# Patient Record
Sex: FEMALE | Race: WHITE | NOT HISPANIC OR LATINO
[De-identification: names, ages, dates, MRNs, and addresses within clinical notes are randomized per-mention and may not be internally consistent; named-entity substitution may affect disease eponyms.]

---

## 2021-06-21 PROBLEM — Z00.00 ENCOUNTER FOR PREVENTIVE HEALTH EXAMINATION: Status: ACTIVE | Noted: 2021-06-21

## 2021-06-22 ENCOUNTER — APPOINTMENT (OUTPATIENT)
Dept: CV DIAGNOSTICS | Facility: HOSPITAL | Age: 76
End: 2021-06-22

## 2021-06-22 ENCOUNTER — OUTPATIENT (OUTPATIENT)
Dept: OUTPATIENT SERVICES | Facility: HOSPITAL | Age: 76
LOS: 1 days | End: 2021-06-22
Payer: MEDICARE

## 2021-06-22 ENCOUNTER — TRANSCRIPTION ENCOUNTER (OUTPATIENT)
Age: 76
End: 2021-06-22

## 2021-06-22 DIAGNOSIS — I25.10 ATHEROSCLEROTIC HEART DISEASE OF NATIVE CORONARY ARTERY WITHOUT ANGINA PECTORIS: ICD-10-CM

## 2021-06-22 PROCEDURE — 93018 CV STRESS TEST I&R ONLY: CPT

## 2021-06-22 PROCEDURE — 78452 HT MUSCLE IMAGE SPECT MULT: CPT

## 2021-06-22 PROCEDURE — 93016 CV STRESS TEST SUPVJ ONLY: CPT

## 2021-06-22 PROCEDURE — 78452 HT MUSCLE IMAGE SPECT MULT: CPT | Mod: 26,MH

## 2021-06-22 PROCEDURE — A9500: CPT

## 2021-06-22 PROCEDURE — 93017 CV STRESS TEST TRACING ONLY: CPT

## 2021-07-02 ENCOUNTER — NON-APPOINTMENT (OUTPATIENT)
Age: 76
End: 2021-07-02

## 2021-07-08 ENCOUNTER — APPOINTMENT (OUTPATIENT)
Age: 76
End: 2021-07-08
Payer: MEDICARE

## 2021-07-08 VITALS
TEMPERATURE: 97.3 F | WEIGHT: 112 LBS | HEIGHT: 62 IN | DIASTOLIC BLOOD PRESSURE: 54 MMHG | HEART RATE: 87 BPM | RESPIRATION RATE: 16 BRPM | BODY MASS INDEX: 20.61 KG/M2 | SYSTOLIC BLOOD PRESSURE: 96 MMHG | OXYGEN SATURATION: 97 %

## 2021-07-08 DIAGNOSIS — I73.9 PERIPHERAL VASCULAR DISEASE, UNSPECIFIED: ICD-10-CM

## 2021-07-08 DIAGNOSIS — Z72.0 TOBACCO USE: ICD-10-CM

## 2021-07-08 DIAGNOSIS — I25.10 ATHEROSCLEROTIC HEART DISEASE OF NATIVE CORONARY ARTERY W/OUT ANGINA PECTORIS: ICD-10-CM

## 2021-07-08 PROCEDURE — 99204 OFFICE O/P NEW MOD 45 MIN: CPT | Mod: 25

## 2021-07-08 PROCEDURE — 94010 BREATHING CAPACITY TEST: CPT | Mod: PD

## 2021-07-08 PROCEDURE — 94727 GAS DIL/WSHOT DETER LNG VOL: CPT

## 2021-07-08 PROCEDURE — ZZZZZ: CPT | Mod: PD

## 2021-07-08 PROCEDURE — 94729 DIFFUSING CAPACITY: CPT

## 2021-07-08 PROCEDURE — 99406 BEHAV CHNG SMOKING 3-10 MIN: CPT | Mod: 25,PD

## 2021-07-08 NOTE — PHYSICAL EXAM
[No Acute Distress] : no acute distress [Normal Oropharynx] : normal oropharynx [Normal Appearance] : normal appearance [No Neck Mass] : no neck mass [Normal Rate/Rhythm] : normal rate/rhythm [Normal S1, S2] : normal s1, s2 [No Murmurs] : no murmurs [No Resp Distress] : no resp distress [Clear to Auscultation Bilaterally] : clear to auscultation bilaterally [No Abnormalities] : no abnormalities [Benign] : benign [Normal Gait] : normal gait [No Clubbing] : no clubbing [No Cyanosis] : no cyanosis [No Edema] : no edema [FROM] : FROM [Normal Color/ Pigmentation] : normal color/ pigmentation [No Focal Deficits] : no focal deficits [Oriented x3] : oriented x3 [Normal Affect] : normal affect [TextBox_68] : Diminished breath sounds bilaterally

## 2021-07-08 NOTE — PROCEDURE
[FreeTextEntry1] : PFT personally reviewed 7/8/21 mild obstructive ventilatory defect with mild gas exchange abnormality

## 2021-07-08 NOTE — ASSESSMENT
[FreeTextEntry1] : 76 year old female current everyday smoker, COPD emphysema presrnts for evaluation, pre procedure optimization prior to coronary angiography\par \par Continue Trelegy Ellipta 100-25 mcg daily\par Smoking cessation discussed, patient will continue to try to stop, advised patient to stop prior to planned procedures\par Nebulizer machine ordered\par Albuterol/Ipratropium via nebulizer prior to Trelegy and every 6 hours as needed\par LDCT discussed, patient would like to defer until later date after coronary angiography\par 6 minute walk deferred secondary lower extremity PVD\par \par Follow up 4 months  \par \par Patient pulmonary status optimized for planned cardiac procedure

## 2021-07-08 NOTE — REASON FOR VISIT
[Initial] : an initial visit [Cough] : cough [COPD] : COPD [Pre-op Risk Stratification] : pre-op risk stratification [Nicotine Dependence] : nicotine dependence [Family Member] : family member

## 2021-07-08 NOTE — HISTORY OF PRESENT ILLNESS
[Current] : current [>= 30 pack years] : >= 30 pack years [Never] : never [TextBox_4] : Patient is a 76 year old female current everyday smoker Hx COPD, PVD, CAD presents for evaluation.  Patient reports she is having coronary angiogram tomorrow.  She reports Cardiologist asked she be seen by Pulmonary prior to angiogram.  She is currently smoking, although she reports she has cut down.  She smoked 3 PPD in the past.  She reports she is now smoking 1/5 PPD.  patient reports cough which is at times productive.  She is using Trelegy Ellipta daily.  She does not have nebulizer machine.  She reports no increased respiratory symptoms.  She is unable to perform 6 minute walk testing secondary to leg pain.  She has not undergone LDCT screen.  She is here to establish care.\par   [TextBox_11] : 3 [TextBox_13] : 55

## 2021-07-08 NOTE — COUNSELING
[Risk of tobacco use and health benefits of smoking cessation discussed] : Risk of tobacco use and health benefits of smoking cessation discussed [Use of nicotine replacement therapies and other medications discussed] : Use of nicotine replacement therapies and other medications discussed [Willing to Quit Smoking] : Willing to quit smoking [Tobacco Use Cessation Intermediate Greater Than 3 Minutes Up to 10 Minutes] : Tobacco Use Cessation Intermediate Greater Than 3 Minutes Up to 10 Minutes [None] : None [Good understanding] : Patient has a good understanding of lifestyle changes and steps needed to achieve self management goal [FreeTextEntry1] : 8

## 2021-07-08 NOTE — REVIEW OF SYSTEMS
[Cough] : cough [Sputum] : sputum [Wheezing] : wheezing [Chest Discomfort] : chest discomfort [Claudication] : claudication [Negative] : Endocrine

## 2021-07-09 ENCOUNTER — INPATIENT (INPATIENT)
Facility: HOSPITAL | Age: 76
LOS: 12 days | Discharge: HOME HEALTH | DRG: 234 | End: 2021-07-22
Attending: THORACIC SURGERY (CARDIOTHORACIC VASCULAR SURGERY) | Admitting: INTERNAL MEDICINE
Payer: MEDICARE

## 2021-07-09 VITALS
WEIGHT: 113.98 LBS | DIASTOLIC BLOOD PRESSURE: 68 MMHG | OXYGEN SATURATION: 90 % | TEMPERATURE: 98 F | HEIGHT: 62 IN | RESPIRATION RATE: 16 BRPM | SYSTOLIC BLOOD PRESSURE: 119 MMHG | HEART RATE: 88 BPM

## 2021-07-09 DIAGNOSIS — R94.39 ABNORMAL RESULT OF OTHER CARDIOVASCULAR FUNCTION STUDY: ICD-10-CM

## 2021-07-09 LAB
ANION GAP SERPL CALC-SCNC: 14 MMOL/L — SIGNIFICANT CHANGE UP (ref 5–17)
BUN SERPL-MCNC: 17 MG/DL — SIGNIFICANT CHANGE UP (ref 7–23)
CALCIUM SERPL-MCNC: 10.1 MG/DL — SIGNIFICANT CHANGE UP (ref 8.4–10.5)
CHLORIDE SERPL-SCNC: 93 MMOL/L — LOW (ref 96–108)
CO2 SERPL-SCNC: 20 MMOL/L — LOW (ref 22–31)
CREAT SERPL-MCNC: 1.22 MG/DL — SIGNIFICANT CHANGE UP (ref 0.5–1.3)
GLUCOSE SERPL-MCNC: 135 MG/DL — HIGH (ref 70–99)
HCT VFR BLD CALC: 37.4 % — SIGNIFICANT CHANGE UP (ref 34.5–45)
HGB BLD-MCNC: 13 G/DL — SIGNIFICANT CHANGE UP (ref 11.5–15.5)
MCHC RBC-ENTMCNC: 31.3 PG — SIGNIFICANT CHANGE UP (ref 27–34)
MCHC RBC-ENTMCNC: 34.8 GM/DL — SIGNIFICANT CHANGE UP (ref 32–36)
MCV RBC AUTO: 89.9 FL — SIGNIFICANT CHANGE UP (ref 80–100)
NRBC # BLD: 0 /100 WBCS — SIGNIFICANT CHANGE UP (ref 0–0)
PLATELET # BLD AUTO: 227 K/UL — SIGNIFICANT CHANGE UP (ref 150–400)
POTASSIUM SERPL-MCNC: 3.9 MMOL/L — SIGNIFICANT CHANGE UP (ref 3.5–5.3)
POTASSIUM SERPL-SCNC: 3.9 MMOL/L — SIGNIFICANT CHANGE UP (ref 3.5–5.3)
RBC # BLD: 4.16 M/UL — SIGNIFICANT CHANGE UP (ref 3.8–5.2)
RBC # FLD: 13.5 % — SIGNIFICANT CHANGE UP (ref 10.3–14.5)
SODIUM SERPL-SCNC: 127 MMOL/L — LOW (ref 135–145)
WBC # BLD: 7.56 K/UL — SIGNIFICANT CHANGE UP (ref 3.8–10.5)
WBC # FLD AUTO: 7.56 K/UL — SIGNIFICANT CHANGE UP (ref 3.8–10.5)

## 2021-07-09 PROCEDURE — 99221 1ST HOSP IP/OBS SF/LOW 40: CPT

## 2021-07-09 PROCEDURE — 71045 X-RAY EXAM CHEST 1 VIEW: CPT | Mod: 26

## 2021-07-09 PROCEDURE — 93010 ELECTROCARDIOGRAM REPORT: CPT

## 2021-07-09 RX ORDER — FENTANYL CITRATE 50 UG/ML
25 INJECTION INTRAVENOUS ONCE
Refills: 0 | Status: DISCONTINUED | OUTPATIENT
Start: 2021-07-09 | End: 2021-07-09

## 2021-07-09 RX ORDER — GABAPENTIN 400 MG/1
300 CAPSULE ORAL
Refills: 0 | Status: DISCONTINUED | OUTPATIENT
Start: 2021-07-09 | End: 2021-07-15

## 2021-07-09 RX ORDER — BUPROPION HYDROCHLORIDE 150 MG/1
150 TABLET, EXTENDED RELEASE ORAL DAILY
Refills: 0 | Status: COMPLETED | OUTPATIENT
Start: 2021-07-09 | End: 2021-07-11

## 2021-07-09 RX ORDER — ALBUTEROL 90 UG/1
2 AEROSOL, METERED ORAL EVERY 6 HOURS
Refills: 0 | Status: DISCONTINUED | OUTPATIENT
Start: 2021-07-09 | End: 2021-07-15

## 2021-07-09 RX ORDER — TAMSULOSIN HYDROCHLORIDE 0.4 MG/1
0.4 CAPSULE ORAL AT BEDTIME
Refills: 0 | Status: DISCONTINUED | OUTPATIENT
Start: 2021-07-09 | End: 2021-07-10

## 2021-07-09 RX ORDER — DIPHENHYDRAMINE HCL 50 MG
50 CAPSULE ORAL ONCE
Refills: 0 | Status: COMPLETED | OUTPATIENT
Start: 2021-07-09 | End: 2021-07-09

## 2021-07-09 RX ORDER — HYDROCHLOROTHIAZIDE 25 MG
12.5 TABLET ORAL DAILY
Refills: 0 | Status: DISCONTINUED | OUTPATIENT
Start: 2021-07-09 | End: 2021-07-10

## 2021-07-09 RX ORDER — BUPROPION HYDROCHLORIDE 150 MG/1
150 TABLET, EXTENDED RELEASE ORAL
Refills: 0 | Status: DISCONTINUED | OUTPATIENT
Start: 2021-07-12 | End: 2021-07-15

## 2021-07-09 RX ORDER — CEPHALEXIN 500 MG
250 CAPSULE ORAL DAILY
Refills: 0 | Status: DISCONTINUED | OUTPATIENT
Start: 2021-07-09 | End: 2021-07-15

## 2021-07-09 RX ORDER — LANOLIN ALCOHOL/MO/W.PET/CERES
5 CREAM (GRAM) TOPICAL ONCE
Refills: 0 | Status: COMPLETED | OUTPATIENT
Start: 2021-07-09 | End: 2021-07-09

## 2021-07-09 RX ORDER — CILOSTAZOL 100 MG/1
100 TABLET ORAL
Refills: 0 | Status: DISCONTINUED | OUTPATIENT
Start: 2021-07-09 | End: 2021-07-15

## 2021-07-09 RX ORDER — ASPIRIN/CALCIUM CARB/MAGNESIUM 324 MG
81 TABLET ORAL DAILY
Refills: 0 | Status: DISCONTINUED | OUTPATIENT
Start: 2021-07-09 | End: 2021-07-15

## 2021-07-09 RX ADMIN — Medication 12.5 MILLIGRAM(S): at 18:16

## 2021-07-09 RX ADMIN — TAMSULOSIN HYDROCHLORIDE 0.4 MILLIGRAM(S): 0.4 CAPSULE ORAL at 21:09

## 2021-07-09 RX ADMIN — CILOSTAZOL 100 MILLIGRAM(S): 100 TABLET ORAL at 18:16

## 2021-07-09 RX ADMIN — Medication 50 MILLIGRAM(S): at 14:02

## 2021-07-09 RX ADMIN — GABAPENTIN 300 MILLIGRAM(S): 400 CAPSULE ORAL at 18:16

## 2021-07-09 RX ADMIN — Medication 5 MILLIGRAM(S): at 23:43

## 2021-07-09 RX ADMIN — FENTANYL CITRATE 25 MICROGRAM(S): 50 INJECTION INTRAVENOUS at 15:40

## 2021-07-09 RX ADMIN — Medication 0.5 MILLIGRAM(S): at 22:58

## 2021-07-09 RX ADMIN — Medication 81 MILLIGRAM(S): at 18:16

## 2021-07-09 RX ADMIN — BUPROPION HYDROCHLORIDE 150 MILLIGRAM(S): 150 TABLET, EXTENDED RELEASE ORAL at 18:28

## 2021-07-09 RX ADMIN — Medication 250 MILLIGRAM(S): at 18:16

## 2021-07-09 NOTE — CONSULT NOTE ADULT - SUBJECTIVE AND OBJECTIVE BOX
HPI  75y/o  female with no known implantable devices noted COVID 19 negative with Pfizer Vaccine last dose 21 with PMHX of Current tobacco smoker on Nicotine patch , COPD , HTN and PVD. Pt had recent stress test that was abnormal on 21 see below  . Presents today for [facility] with Dr Quinton Whelan. Pt planned for Peripheral bypass surgery . No complaints of CP no sob no palpitations noted.     Dr. Guzman ,[facility] Cardiologist   < from: Nuclear Stress Test-Pharmacologic (21 @ 10:25) >  NUCLEAR FINDINGS:  The left ventricle was small in size.  There are medium-sized, mild defects in the distal  anterior, anteroapical, distal anterolateral, and apical  lateral walls that are reversible suggestive of ischemia.  There are medium-sized, mild to moderate defects in the  anteroseptal andapical septal walls that are mostly fixed  suggestive of infarct with mild crystal-infarct ischemia.  There are medium-sized, mild defects in the inferior and  basal to mid inferoseptal walls that are mostly fixed  suggestive of infarct with minimal crystal-infarct ischemia.  ------------------------------------------------------------------------  GATED ANALYSIS:  Post-stress gated wall motion analysis was performed (LVEF  > 70%;LVEDV = 37 ml.) revealing hypokinesis of the basal  inferior and basal septal walls and reduced systolic  thickening of the mid to distal anteroseptal, mid to  distal inferior, distal anterior, and distal anterolateral  walls.  ------------------------------------------------------------------------  IMPRESSIONS:Abnormal Study  * Chest Pain: No chest pain with administration of  Regadenoson.  * Symptom: Shortness of breath, nausea, cramping.  * HR Response: Appropriate.  * BP Response: Appropriate.  * Heart Rhythm: Sinus Bradycardia - 57 BPM.    < end of copied text >  < from: Nuclear Stress Test-Pharmacologic (21 @ 10:25) >   Baseline ECG: Nonspecific ST-T wave abnormality.  * ECG Changes: No significant ischemic ST segment changes  beyond baseline abnormalities.  * Arrhythmia: Rare VPDs occurred during stress.  Frequent  VPDs occurred during recovery.  * The left ventricle was small in size.  *There are medium-sized, mild defects in the distal  anterior, anteroapical, distal anterolateral, and apical  lateral walls that are reversible suggestive of ischemia.  * There are medium-sized, mild to moderate defects in the  anteroseptal and apical septal walls that are mostly fixed  suggestive of infarct with mild crystal-infarct ischemia.  * There are medium-sized, mild defects in the inferior and  basal to mid inferoseptal walls that are mostly fixed  suggestive of infarct with minimal crystal-infarct ischemia.  * Post-stress gated wall motion analysis was performed  (LVEF > 70%;LVEDV = 37 ml.) revealing hypokinesis of the  basal inferior and basal septal walls and reduced systolic  thickening of the mid to distal anteroseptal, mid to  distalinferior, distal anterior, and distal anterolateral  walls.  *** No previous Nuclear/Stress exam.  ------------------------------------------------------------------------  Confirmed on  2021 - 14:16:08 by Bridger Garcia M.D.  ------------------------------------------------------------------------    < end of copied text >   (2021 12:18)    CT Surgery consulted for    Past Medical History  HTN (hypertension)    Tobacco use    COPD (chronic obstructive pulmonary disease)    PVD (peripheral vascular disease)        Past Surgical History  No significant past surgical history        MEDICATIONS  (STANDING):  aspirin enteric coated 81 milliGRAM(s) Oral daily  buPROPion SR (12-Hour) 150 milliGRAM(s) Oral daily  cephalexin 250 milliGRAM(s) Oral daily  cilostazol 100 milliGRAM(s) Oral two times a day  gabapentin 300 milliGRAM(s) Oral two times a day  hydrochlorothiazide 12.5 milliGRAM(s) Oral daily  tamsulosin 0.4 milliGRAM(s) Oral at bedtime    MEDICATIONS  (PRN):  ALBUTerol    90 MICROgram(s) HFA Inhaler 2 Puff(s) Inhalation every 6 hours PRN Shortness of Breath      Vital Signs Last 24 Hrs  T(C): 36.4 (21 @ 17:52), Max: 36.6 (21 @ 12:18)  T(F): 97.5 (21 @ 17:52), Max: 97.8 (21 @ 12:18)  HR: 63 (21 @ 17:52) (63 - 88)  BP: 144/72 (21 @ 17:52) (92/54 - 144/72)  RR: 16 (21 @ 17:52) (16 - 18)  SpO2: 95% (21 @ 17:52) (90% - 98%)           Daily Height in cm: 157.48 (2021 17:52)    Daily Weight in k.7 (2021 12:18)  Admit Wt: Drug Dosing Weight  Height (cm): 157.5 (2021 17:52)  Weight (kg): 51.71 (2021 12:46)  BMI (kg/m2): 20.8 (2021 17:52)  BSA (m2): 1.51 (2021 17:52)    Allergies: contrast media (gadolinium-based) (Rash; Short breath; Hives)  contrast media (iodine-based) (Rash; Short breath; Hives)      SOCIAL HISTORY:  Smoker: [ ] Yes  [ ] No        PACK YEARS:                         WHEN QUIT?  ETOH use: [ ] Yes  [ ] No              FREQUENCY / QUANTITY:  Ilicit Drug use:  [ ] Yes  [ ] No      Relevant Family History  FAMILY HISTORY:  No pertinent family history in first degree relatives        Review of Systems  GENERAL:  no weakness, fatigue, fevers or chills  NEURO: no dizziness, numbness, tingling or weakness  SKIN: no itching, burning, rashes, or lesions   HEENT: no visual changes;  no headache, no vertigo, no recent colds  RESPIRATORY: no shortness of breath, no cough, sputum, wheezing, hemoptysis;   CARDIOVASCULAR:  no chest pain,  or palpitations  GI: no abd pain. no N/V/D.  PERIPHERAL VASCULAR: no swelling, no tenderness, no erythema, no varicose veins.     PHYSICAL EXAM  General: Well nourished, well developed, NAD.                                              Neuro: Normal exam oriented to person/place & time with no focal motor or sensory  deficits.                    Eyes: Normal exam of conjunctiva & lids, pupils equally reactive.   ENT: Normal exam of nasal/oral mucosa with absence of cyanosis.   Neck: Normal exam of jugular veins, trachea & thyroid.   Chest: Normal lung exam with good air movement absence of wheezes, rales, or rhonchi:                                                                          CV:  Auscultation: normal S1S2, Irregular   Murmurs   Carotids: No Bruits[ ]  Abdominal Aorta: normal [ ] nonpalpable[ ]                                                                         GI: Normal exam of abdomen with no noted masses or tenderness. +BSx4Q                                                                                            Extremities: Normal no evidence of cyanosis or deformity, Edema:   Lower Extremity Pulses: Right[ ] Left[ ]Varicosities[ ]  SKIN : Normal exam to inspection & palpation.                                                           LABS:                        13.0   7.56  )-----------( 227      ( 2021 13:27 )             37.4     07-09    127<L>  |  93<L>  |  17  ----------------------------<  135<H>  3.9   |  20<L>  |  1.22    Ca    10.1      2021 13:27            Cardiac Cath:    TTE / EMILY:    Assessment:  76y Female presents with     Plan:             HPI  76 year old female current everyday smoker on nicotine patch Hx COPD, PVD, CAD presents to [facility] for LHC, as preop testing prior to PAD procedure. S/p LHC revealing TVD, CT surgery consulted for CABG eval.       CT Surgery consulted for CABG eval     Past Medical History  HTN (hypertension)    Tobacco use    COPD (chronic obstructive pulmonary disease)    PVD (peripheral vascular disease)        Past Surgical History  No significant past surgical history        MEDICATIONS  (STANDING):  aspirin enteric coated 81 milliGRAM(s) Oral daily  buPROPion SR (12-Hour) 150 milliGRAM(s) Oral daily  cephalexin 250 milliGRAM(s) Oral daily  cilostazol 100 milliGRAM(s) Oral two times a day  gabapentin 300 milliGRAM(s) Oral two times a day  hydrochlorothiazide 12.5 milliGRAM(s) Oral daily  tamsulosin 0.4 milliGRAM(s) Oral at bedtime    MEDICATIONS  (PRN):  ALBUTerol    90 MICROgram(s) HFA Inhaler 2 Puff(s) Inhalation every 6 hours PRN Shortness of Breath      Vital Signs Last 24 Hrs  T(C): 36.4 (21 @ 17:52), Max: 36.6 (21 @ 12:18)  T(F): 97.5 (21 @ 17:52), Max: 97.8 (21 @ 12:18)  HR: 63 (21 @ 17:52) (63 - 88)  BP: 144/72 (21 @ 17:52) (92/54 - 144/72)  RR: 16 (21 @ 17:52) (16 - 18)  SpO2: 95% (21 @ 17:52) (90% - 98%)           Daily Height in cm: 157.48 (2021 17:52)    Daily Weight in k.7 (2021 12:18)  Admit Wt: Drug Dosing Weight  Height (cm): 157.5 (2021 17:52)  Weight (kg): 51.71 (2021 12:46)  BMI (kg/m2): 20.8 (2021 17:52)  BSA (m2): 1.51 (2021 17:52)    Allergies: contrast media (gadolinium-based) (Rash; Short breath; Hives)  contrast media (iodine-based) (Rash; Short breath; Hives)      SOCIAL HISTORY:  Smoker: [x ] Yes  [ ] No        PACK YEARS:      33 years                   WHEN QUIT?  ETOH use: [ ] Yes  [x ] No              FREQUENCY / QUANTITY:  Ilicit Drug use:  [ ] Yes  [ x] No      Relevant Family History  FAMILY HISTORY:  No pertinent family history in first degree relatives        Review of Systems  GENERAL:  no weakness, fatigue, fevers or chills  NEURO: no dizziness, numbness, tingling or weakness  SKIN: no itching, burning, rashes, or lesions   HEENT: no visual changes;  no headache, no vertigo, no recent colds  RESPIRATORY: no shortness of breath, no cough, sputum, wheezing, hemoptysis  CARDIOVASCULAR:  no chest pain,  or palpitations  GI: no abd pain. no N/V/D.  PERIPHERAL VASCULAR: no swelling, no tenderness, no erythema, no varicose veins.     PHYSICAL EXAM  General: Well nourished, well developed, NAD.                                              Neuro: Normal exam oriented to person/place & time with no focal motor or sensory  deficits.                    Chest: Normal lung exam with good air movement absence of wheezes, rales, or rhonchi                                                                     CV:  Auscultation: normal S1S2, regular NO  Murmurs                                                                         GI: Normal exam of abdomen with no noted masses or tenderness. +BSx4Q                                                                                            Extremities: Normal no evidence of cyanosis or deformity, Edema: none  Lower Extremity Pulses: Right[ x] Left[ x]  SKIN : Normal exam to inspection & palpation.                                                           LABS:                        13.0   7.56  )-----------( 227      ( 2021 13:27 )             37.4     07-    127<L>  |  93<L>  |  17  ----------------------------<  135<H>  3.9   |  20<L>  |  1.22    Ca    10.1      2021 13:27

## 2021-07-09 NOTE — H&P CARDIOLOGY - HISTORY OF PRESENT ILLNESS
This is a 77y/o  female with no known implantable devices noted COVID 19 negative with Pfizer Vaccine last dose 2/4/21 with PMHX of Current tobacco smoker , COPD , HTN and PVD. Pt had recent stress test that was abnormal on 6/22/21 see below  . Presents today for [facility] with Dr Quinton Whelan. Pt planned for Peripheral bypass surgery . No complaints of CP no sob no palpitations noted.   < from: Nuclear Stress Test-Pharmacologic (06.22.21 @ 10:25) >  NUCLEAR FINDINGS:  The left ventricle was small in size.  There are medium-sized, mild defects in the distal  anterior, anteroapical, distal anterolateral, and apical  lateral walls that are reversible suggestive of ischemia.  There are medium-sized, mild to moderate defects in the  anteroseptal andapical septal walls that are mostly fixed  suggestive of infarct with mild crystal-infarct ischemia.  There are medium-sized, mild defects in the inferior and  basal to mid inferoseptal walls that are mostly fixed  suggestive of infarct with minimal crystal-infarct ischemia.  ------------------------------------------------------------------------  GATED ANALYSIS:  Post-stress gated wall motion analysis was performed (LVEF  > 70%;LVEDV = 37 ml.) revealing hypokinesis of the basal  inferior and basal septal walls and reduced systolic  thickening of the mid to distal anteroseptal, mid to  distal inferior, distal anterior, and distal anterolateral  walls.  ------------------------------------------------------------------------  IMPRESSIONS:Abnormal Study  * Chest Pain: No chest pain with administration of  Regadenoson.  * Symptom: Shortness of breath, nausea, cramping.  * HR Response: Appropriate.  * BP Response: Appropriate.  * Heart Rhythm: Sinus Bradycardia - 57 BPM.    < end of copied text >  < from: Nuclear Stress Test-Pharmacologic (06.22.21 @ 10:25) >   Baseline ECG: Nonspecific ST-T wave abnormality.  * ECG Changes: No significant ischemic ST segment changes  beyond baseline abnormalities.  * Arrhythmia: Rare VPDs occurred during stress.  Frequent  VPDs occurred during recovery.  * The left ventricle was small in size.  *There are medium-sized, mild defects in the distal  anterior, anteroapical, distal anterolateral, and apical  lateral walls that are reversible suggestive of ischemia.  * There are medium-sized, mild to moderate defects in the  anteroseptal and apical septal walls that are mostly fixed  suggestive of infarct with mild crystal-infarct ischemia.  * There are medium-sized, mild defects in the inferior and  basal to mid inferoseptal walls that are mostly fixed  suggestive of infarct with minimal crystal-infarct ischemia.  * Post-stress gated wall motion analysis was performed  (LVEF > 70%;LVEDV = 37 ml.) revealing hypokinesis of the  basal inferior and basal septal walls and reduced systolic  thickening of the mid to distal anteroseptal, mid to  distalinferior, distal anterior, and distal anterolateral  walls.  *** No previous Nuclear/Stress exam.  ------------------------------------------------------------------------  Confirmed on  6/22/2021 - 14:16:08 by Bridger Garcia M.D.  ------------------------------------------------------------------------    < end of copied text >   This is a 77y/o  female with no known implantable devices noted COVID 19 negative with Pfizer Vaccine last dose 2/4/21 with PMHX of Current tobacco smoker on Nicotine patch , COPD , HTN and PVD. Pt had recent stress test that was abnormal on 6/22/21 see below  . Presents today for [facility] with Dr Quinton Whelan. Pt planned for Peripheral bypass surgery . No complaints of CP no sob no palpitations noted.     Dr. Guzman ,[facility] Cardiologist   < from: Nuclear Stress Test-Pharmacologic (06.22.21 @ 10:25) >  NUCLEAR FINDINGS:  The left ventricle was small in size.  There are medium-sized, mild defects in the distal  anterior, anteroapical, distal anterolateral, and apical  lateral walls that are reversible suggestive of ischemia.  There are medium-sized, mild to moderate defects in the  anteroseptal andapical septal walls that are mostly fixed  suggestive of infarct with mild crystal-infarct ischemia.  There are medium-sized, mild defects in the inferior and  basal to mid inferoseptal walls that are mostly fixed  suggestive of infarct with minimal crystal-infarct ischemia.  ------------------------------------------------------------------------  GATED ANALYSIS:  Post-stress gated wall motion analysis was performed (LVEF  > 70%;LVEDV = 37 ml.) revealing hypokinesis of the basal  inferior and basal septal walls and reduced systolic  thickening of the mid to distal anteroseptal, mid to  distal inferior, distal anterior, and distal anterolateral  walls.  ------------------------------------------------------------------------  IMPRESSIONS:Abnormal Study  * Chest Pain: No chest pain with administration of  Regadenoson.  * Symptom: Shortness of breath, nausea, cramping.  * HR Response: Appropriate.  * BP Response: Appropriate.  * Heart Rhythm: Sinus Bradycardia - 57 BPM.    < end of copied text >  < from: Nuclear Stress Test-Pharmacologic (06.22.21 @ 10:25) >   Baseline ECG: Nonspecific ST-T wave abnormality.  * ECG Changes: No significant ischemic ST segment changes  beyond baseline abnormalities.  * Arrhythmia: Rare VPDs occurred during stress.  Frequent  VPDs occurred during recovery.  * The left ventricle was small in size.  *There are medium-sized, mild defects in the distal  anterior, anteroapical, distal anterolateral, and apical  lateral walls that are reversible suggestive of ischemia.  * There are medium-sized, mild to moderate defects in the  anteroseptal and apical septal walls that are mostly fixed  suggestive of infarct with mild crystal-infarct ischemia.  * There are medium-sized, mild defects in the inferior and  basal to mid inferoseptal walls that are mostly fixed  suggestive of infarct with minimal crystal-infarct ischemia.  * Post-stress gated wall motion analysis was performed  (LVEF > 70%;LVEDV = 37 ml.) revealing hypokinesis of the  basal inferior and basal septal walls and reduced systolic  thickening of the mid to distal anteroseptal, mid to  distalinferior, distal anterior, and distal anterolateral  walls.  *** No previous Nuclear/Stress exam.  ------------------------------------------------------------------------  Confirmed on  6/22/2021 - 14:16:08 by Bridger Garcia M.D.  ------------------------------------------------------------------------    < end of copied text >

## 2021-07-09 NOTE — CONSULT NOTE ADULT - ASSESSMENT
76 year old female current everyday smoker Hx COPD, PVD, CAD presents to [facility] for LHC, as preop testing for       76 year old female current everyday smoker on nicotine patch Hx COPD, PVD, CAD presents to [facility] for LHC, as preop testing prior to PAD procedure. S/p LHC revealing TVD, CT surgery consulted for CABG eval.     CT Surgery evaluation in progress  Preoperative work up in progress  Dr Cole to review angiogram findings  TTE to eval cardiac function / r/o valvular disease  asa, statin, start beta-blockers as tolerated      76 year old female current everyday smoker on nicotine patch Hx COPD, PVD, CAD presents to [facility] for LHC, as preop testing prior to PAD procedure. S/p LHC revealing TVD, CT surgery consulted for CABG eval.     CT Surgery evaluation in progress  Preoperative work up in progress  Dr Cole to review angiogram findings  TTE to eval cardiac function / r/o valvular disease  asa, statin, start beta-blockers as tolerated  arterial doppler studies to evaluate radial arteries for bypass graft conduit

## 2021-07-09 NOTE — H&P CARDIOLOGY - PMH
COPD (chronic obstructive pulmonary disease)    HTN (hypertension)    PVD (peripheral vascular disease)    Tobacco use

## 2021-07-09 NOTE — PROVIDER CONTACT NOTE (OTHER) - ASSESSMENT
Patient agitated. Patient is current smoker, feeling anxious and agitated. Family member would like a medication to help with anxiety.

## 2021-07-09 NOTE — CHART NOTE - NSCHARTNOTEFT_GEN_A_CORE
Notified by RN that patient is anxious at bedside because she cannot smoke. Pt is evaluated at bedside, no acute distress is noted. Pt states that she feels anxious being in the hospital, her condition and expresses a desire to smoke to quell her anxiety. Pt denies chest pain, palpitations, lightheadedness, dizziness, headaches, vision changes, nausea/vomiting and diaphoresis.     ICU Vital Signs Last 24 Hrs  T(C): 36.6 (09 Jul 2021 20:23), Max: 36.6 (09 Jul 2021 12:18)  T(F): 97.8 (09 Jul 2021 20:23), Max: 97.8 (09 Jul 2021 12:18)  HR: 77 (09 Jul 2021 20:23) (63 - 88)  BP: 115/65 (09 Jul 2021 20:23) (92/54 - 144/72)  BP(mean): 85 (09 Jul 2021 12:18) (85 - 85)  ABP: --  ABP(mean): --  RR: 18 (09 Jul 2021 20:23) (16 - 18)  SpO2: 92% (09 Jul 2021 21:13) (89% - 98%)    07-09    127<L>  |  93<L>  |  17  ----------------------------<  135<H>  3.9   |  20<L>  |  1.22    Ca    10.1      09 Jul 2021 13:27                            13.0   7.56  )-----------( 227      ( 09 Jul 2021 13:27 )             37.4       IMPRESSION: Anxiety   - Pt is mentating well at bedside with stable vitals  - Ativan 0.5mg x1   - Discussed with Dr. Strickland on overnight event, aware and in agreement   - Continue to monitor vitals closely overnight   - Endorse/sign out to day team on overnight events   - RN aware of management       Jocy Lopez PA-C   Dept of Medicine   [id number]

## 2021-07-10 LAB
ANION GAP SERPL CALC-SCNC: 13 MMOL/L — SIGNIFICANT CHANGE UP (ref 5–17)
APPEARANCE UR: CLEAR — SIGNIFICANT CHANGE UP
APTT BLD: 27.2 SEC — LOW (ref 27.5–35.5)
BILIRUB UR-MCNC: NEGATIVE — SIGNIFICANT CHANGE UP
BLD GP AB SCN SERPL QL: NEGATIVE — SIGNIFICANT CHANGE UP
BUN SERPL-MCNC: 24 MG/DL — HIGH (ref 7–23)
CALCIUM SERPL-MCNC: 9.6 MG/DL — SIGNIFICANT CHANGE UP (ref 8.4–10.5)
CHLORIDE SERPL-SCNC: 95 MMOL/L — LOW (ref 96–108)
CHLORIDE UR-SCNC: <35 MMOL/L — SIGNIFICANT CHANGE UP
CHOLEST SERPL-MCNC: 164 MG/DL — SIGNIFICANT CHANGE UP
CO2 SERPL-SCNC: 19 MMOL/L — LOW (ref 22–31)
COLOR SPEC: SIGNIFICANT CHANGE UP
COVID-19 SPIKE DOMAIN AB INTERP: POSITIVE
COVID-19 SPIKE DOMAIN ANTIBODY RESULT: >250 U/ML — HIGH
CREAT SERPL-MCNC: 1.22 MG/DL — SIGNIFICANT CHANGE UP (ref 0.5–1.3)
DIFF PNL FLD: ABNORMAL
GLUCOSE SERPL-MCNC: 135 MG/DL — HIGH (ref 70–99)
GLUCOSE UR QL: NEGATIVE — SIGNIFICANT CHANGE UP
HCT VFR BLD CALC: 31.8 % — LOW (ref 34.5–45)
HDLC SERPL-MCNC: 77 MG/DL — SIGNIFICANT CHANGE UP
HGB BLD-MCNC: 10.9 G/DL — LOW (ref 11.5–15.5)
INR BLD: 0.92 RATIO — SIGNIFICANT CHANGE UP (ref 0.88–1.16)
KETONES UR-MCNC: NEGATIVE — SIGNIFICANT CHANGE UP
LEUKOCYTE ESTERASE UR-ACNC: NEGATIVE — SIGNIFICANT CHANGE UP
LIPID PNL WITH DIRECT LDL SERPL: 79 MG/DL — SIGNIFICANT CHANGE UP
MCHC RBC-ENTMCNC: 31.1 PG — SIGNIFICANT CHANGE UP (ref 27–34)
MCHC RBC-ENTMCNC: 34.3 GM/DL — SIGNIFICANT CHANGE UP (ref 32–36)
MCV RBC AUTO: 90.6 FL — SIGNIFICANT CHANGE UP (ref 80–100)
MRSA PCR RESULT.: SIGNIFICANT CHANGE UP
NITRITE UR-MCNC: NEGATIVE — SIGNIFICANT CHANGE UP
NON HDL CHOLESTEROL: 86 MG/DL — SIGNIFICANT CHANGE UP
NRBC # BLD: 0 /100 WBCS — SIGNIFICANT CHANGE UP (ref 0–0)
NT-PROBNP SERPL-SCNC: 376 PG/ML — HIGH (ref 0–300)
OSMOLALITY UR: 227 MOS/KG — LOW (ref 300–900)
PA ADP PRP-ACNC: 263 PRU — SIGNIFICANT CHANGE UP (ref 194–417)
PH UR: 6 — SIGNIFICANT CHANGE UP (ref 5–8)
PLATELET # BLD AUTO: 203 K/UL — SIGNIFICANT CHANGE UP (ref 150–400)
POTASSIUM SERPL-MCNC: 3.6 MMOL/L — SIGNIFICANT CHANGE UP (ref 3.5–5.3)
POTASSIUM SERPL-SCNC: 3.6 MMOL/L — SIGNIFICANT CHANGE UP (ref 3.5–5.3)
PREALB SERPL-MCNC: 17 MG/DL — LOW (ref 20–40)
PROT UR-MCNC: NEGATIVE — SIGNIFICANT CHANGE UP
PROTHROM AB SERPL-ACNC: 11.1 SEC — SIGNIFICANT CHANGE UP (ref 10.6–13.6)
RBC # BLD: 3.51 M/UL — LOW (ref 3.8–5.2)
RBC # FLD: 13.4 % — SIGNIFICANT CHANGE UP (ref 10.3–14.5)
RH IG SCN BLD-IMP: POSITIVE — SIGNIFICANT CHANGE UP
S AUREUS DNA NOSE QL NAA+PROBE: SIGNIFICANT CHANGE UP
SARS-COV-2 IGG+IGM SERPL QL IA: >250 U/ML — HIGH
SARS-COV-2 IGG+IGM SERPL QL IA: POSITIVE
SODIUM SERPL-SCNC: 127 MMOL/L — LOW (ref 135–145)
SODIUM UR-SCNC: 36 MMOL/L — SIGNIFICANT CHANGE UP
SP GR SPEC: 1.02 — SIGNIFICANT CHANGE UP (ref 1.01–1.02)
TRIGL SERPL-MCNC: 36 MG/DL — SIGNIFICANT CHANGE UP
UROBILINOGEN FLD QL: NEGATIVE — SIGNIFICANT CHANGE UP
WBC # BLD: 13.44 K/UL — HIGH (ref 3.8–10.5)
WBC # FLD AUTO: 13.44 K/UL — HIGH (ref 3.8–10.5)

## 2021-07-10 PROCEDURE — 93010 ELECTROCARDIOGRAM REPORT: CPT

## 2021-07-10 RX ORDER — LANOLIN ALCOHOL/MO/W.PET/CERES
5 CREAM (GRAM) TOPICAL ONCE
Refills: 0 | Status: COMPLETED | OUTPATIENT
Start: 2021-07-10 | End: 2021-07-10

## 2021-07-10 RX ORDER — SODIUM CHLORIDE 9 MG/ML
1 INJECTION INTRAMUSCULAR; INTRAVENOUS; SUBCUTANEOUS
Refills: 0 | Status: DISCONTINUED | OUTPATIENT
Start: 2021-07-10 | End: 2021-07-15

## 2021-07-10 RX ORDER — SODIUM CHLORIDE 9 MG/ML
500 INJECTION INTRAMUSCULAR; INTRAVENOUS; SUBCUTANEOUS ONCE
Refills: 0 | Status: COMPLETED | OUTPATIENT
Start: 2021-07-10 | End: 2021-07-10

## 2021-07-10 RX ORDER — HEPARIN SODIUM 5000 [USP'U]/ML
5000 INJECTION INTRAVENOUS; SUBCUTANEOUS EVERY 12 HOURS
Refills: 0 | Status: DISCONTINUED | OUTPATIENT
Start: 2021-07-10 | End: 2021-07-15

## 2021-07-10 RX ORDER — NICOTINE POLACRILEX 2 MG
1 GUM BUCCAL DAILY
Refills: 0 | Status: DISCONTINUED | OUTPATIENT
Start: 2021-07-10 | End: 2021-07-15

## 2021-07-10 RX ORDER — METOPROLOL TARTRATE 50 MG
12.5 TABLET ORAL
Refills: 0 | Status: DISCONTINUED | OUTPATIENT
Start: 2021-07-10 | End: 2021-07-10

## 2021-07-10 RX ORDER — PANTOPRAZOLE SODIUM 20 MG/1
40 TABLET, DELAYED RELEASE ORAL
Refills: 0 | Status: DISCONTINUED | OUTPATIENT
Start: 2021-07-10 | End: 2021-07-15

## 2021-07-10 RX ADMIN — HEPARIN SODIUM 5000 UNIT(S): 5000 INJECTION INTRAVENOUS; SUBCUTANEOUS at 17:43

## 2021-07-10 RX ADMIN — SODIUM CHLORIDE 1000 MILLILITER(S): 9 INJECTION INTRAMUSCULAR; INTRAVENOUS; SUBCUTANEOUS at 22:39

## 2021-07-10 RX ADMIN — PANTOPRAZOLE SODIUM 40 MILLIGRAM(S): 20 TABLET, DELAYED RELEASE ORAL at 17:47

## 2021-07-10 RX ADMIN — SODIUM CHLORIDE 1 GRAM(S): 9 INJECTION INTRAMUSCULAR; INTRAVENOUS; SUBCUTANEOUS at 17:48

## 2021-07-10 RX ADMIN — SODIUM CHLORIDE 1000 MILLILITER(S): 9 INJECTION INTRAMUSCULAR; INTRAVENOUS; SUBCUTANEOUS at 17:42

## 2021-07-10 RX ADMIN — Medication 250 MILLIGRAM(S): at 11:34

## 2021-07-10 RX ADMIN — GABAPENTIN 300 MILLIGRAM(S): 400 CAPSULE ORAL at 05:38

## 2021-07-10 RX ADMIN — CILOSTAZOL 100 MILLIGRAM(S): 100 TABLET ORAL at 17:43

## 2021-07-10 RX ADMIN — GABAPENTIN 300 MILLIGRAM(S): 400 CAPSULE ORAL at 17:48

## 2021-07-10 RX ADMIN — BUPROPION HYDROCHLORIDE 150 MILLIGRAM(S): 150 TABLET, EXTENDED RELEASE ORAL at 11:34

## 2021-07-10 RX ADMIN — Medication 12.5 MILLIGRAM(S): at 05:38

## 2021-07-10 RX ADMIN — Medication 5 MILLIGRAM(S): at 22:41

## 2021-07-10 RX ADMIN — Medication 1 PATCH: at 11:40

## 2021-07-10 RX ADMIN — CILOSTAZOL 100 MILLIGRAM(S): 100 TABLET ORAL at 05:38

## 2021-07-10 RX ADMIN — Medication 81 MILLIGRAM(S): at 11:35

## 2021-07-10 RX ADMIN — Medication 1 PATCH: at 17:49

## 2021-07-10 NOTE — CHART NOTE - NSCHARTNOTEFT_GEN_A_CORE
Notified by RN of pt's BP 72/48. Pt asymptomatic, denies any dizziness and lightheadedness. Spoke with Dr. Em deleon to dcd Lopressor po 12.5mg BID and order IVF 500cc over 60mins.     [place]  [id number]

## 2021-07-10 NOTE — PROGRESS NOTE ADULT - SUBJECTIVE AND OBJECTIVE BOX
DATE OF SERVICE: 07-10-21 @ 11:39  CHIEF COMPLAINT:Patient is a 76y old  Female who presents with a chief complaint of cabg eval (09 Jul 2021 17:56)    	        PAST MEDICAL & SURGICAL HISTORY:  HTN (hypertension)    Tobacco use    COPD (chronic obstructive pulmonary disease)    PVD (peripheral vascular disease)    No significant past surgical history            REVIEW OF SYSTEMS:    NECK: No pain or stiffness  RESPIRATORY: occ cough / chronic  / crocker  CARDIOVASCULAR: No chest pain, palpitations, passing out, dizziness,   GASTROINTESTINAL: No abdominal or epigastric pain. No nausea, vomiting, or hematemesis; No diarrhea or constipation. No melena or hematochezia.  GENITOURINARY: No dysuria, frequency, hematuria, or incontinence  NEUROLOGICAL: No headaches,     MUSCULOSKELETAL: No joint pain or swelling; No muscle, back, or extremity pain    Medications:  MEDICATIONS  (STANDING):  aspirin enteric coated 81 milliGRAM(s) Oral daily  buPROPion SR (12-Hour) 150 milliGRAM(s) Oral daily  cephalexin 250 milliGRAM(s) Oral daily  cilostazol 100 milliGRAM(s) Oral two times a day  gabapentin 300 milliGRAM(s) Oral two times a day  hydrochlorothiazide 12.5 milliGRAM(s) Oral daily  nicotine - 21 mG/24Hr(s) Patch 1 patch Transdermal daily  tamsulosin 0.4 milliGRAM(s) Oral at bedtime    MEDICATIONS  (PRN):  ALBUTerol    90 MICROgram(s) HFA Inhaler 2 Puff(s) Inhalation every 6 hours PRN Shortness of Breath    	    PHYSICAL EXAM:  T(C): 36.7 (07-10-21 @ 04:10), Max: 36.7 (07-10-21 @ 04:10)  HR: 82 (07-10-21 @ 04:10) (63 - 88)  BP: 98/59 (07-10-21 @ 04:10) (92/54 - 144/72)  RR: 16 (07-10-21 @ 04:10) (16 - 18)  SpO2: 97% (07-10-21 @ 04:10) (89% - 98%)  Wt(kg): --  I&O's Summary    09 Jul 2021 07:01  -  10 Jul 2021 07:00  --------------------------------------------------------  IN: 240 mL / OUT: 0 mL / NET: 240 mL    10 Jul 2021 07:01  -  10 Jul 2021 11:39  --------------------------------------------------------  IN: 240 mL / OUT: 0 mL / NET: 240 mL        Appearance: Normal	  HEENT:   Normal oral mucosa, PERRL, EOMI	  Lymphatic: No lymphadenopathy  Cardiovascular: Normal S1 S2, No JVD,  Respiratory: dec bs   Gastrointestinal:  Soft, Non-tender, + BS	  	  Neurologic: Non-focal  Extremities: Normal range of motion, No clubbing, cyanosis or edema  Vascular: Peripheral pulses palpable 2+ bilaterally    TELEMETRY: 	    ECG:  	  RADIOLOGY:  OTHER: 	  	  LABS:	 	    CARDIAC MARKERS:                                10.9   13.44 )-----------( 203      ( 10 Jul 2021 06:02 )             31.8     07-10    127<L>  |  95<L>  |  24<H>  ----------------------------<  135<H>  3.6   |  19<L>  |  1.22    Ca    9.6      10 Jul 2021 06:02      proBNP: Serum Pro-Brain Natriuretic Peptide: 376 pg/mL (07-10 @ 06:02)    Lipid Profile:   HgA1c:   TSH:

## 2021-07-10 NOTE — CHART NOTE - NSCHARTNOTEFT_GEN_A_CORE
Notified by RN of patient with BP of 78/42. Patient currently laying in bed, asymptomatic. Denies dizziness, lightheadedness, chest pain, SOB, palpitations. Of note, patient was hypotensive earlier today at 1PM to 72/48; requiring 1L NS bolus. BP recovered at that time to 96/54. Dr. Strickland called and notified of the current change in BP. Will give an additional 500cc NS bolus. Nurse notified to check repeat pressure following fluids. If there is no improvement in BP, will contact MICU for consult. Dr. Strickland agrees with above plan. Will continue to monitor patient overnight.    Melva Helm PA-C  Medicine  [phone] Notified by RN of patient with BP of 78/42. Patient currently laying in bed, asymptomatic. Denies dizziness, lightheadedness, chest pain, SOB, palpitations. Of note, patient was hypotensive earlier today at 1PM to 72/48; requiring 1L NS bolus. BP recovered at that time to 96/54. Dr. Strickland called and notified of the current change in BP. Will give an additional 500cc NS bolus. Nurse notified to check repeat pressure following fluids. If there is no improvement in BP, will contact MICU for consult. Dr. Strickland agrees with above plan. Will continue to monitor patient overnight.    Melva Helm PA-C  Medicine  [id number]    ADDENDUM: Repeat blood pressure 96/72. Remains asymptomatic. Will continue to monitor overnight. Notified by RN of patient with BP of 78/42. Patient currently laying in bed, asymptomatic. Denies dizziness, lightheadedness, chest pain, SOB, palpitations. Of note, patient was hypotensive earlier today at 1PM to 72/48; requiring 1L NS bolus. BP recovered at that time to 96/54. Dr. Strickland called and notified of the current change in BP. Will give an additional 500cc NS bolus. Nurse notified to check repeat pressure following fluids. If there is no improvement in BP, will contact MICU for consult. Dr. Strickland agrees with above plan. Will continue to monitor patient overnight.    Melva Helm PA-C  Medicine  [phone]    ADDENDUM: Repeat blood pressure 98/76. Remains asymptomatic. Will continue to monitor overnight.

## 2021-07-10 NOTE — CONSULT NOTE ADULT - SUBJECTIVE AND OBJECTIVE BOX
CHIEF COMPLAINT:Patient is a 76y old  Female who presents with a chief complaint of cabg eval (09 Jul 2021 17:56)      HISTORY OF PRESENT ILLNESS:    76 female with tobacco use , PVD, HTN planned for lower ext bypass surgery seen by me for pre op sent for cath showing now triple vessel cad   no cp   has SHEIKH     PAST MEDICAL & SURGICAL HISTORY:  HTN (hypertension)    Tobacco use    COPD (chronic obstructive pulmonary disease)    PVD (peripheral vascular disease)    No significant past surgical history            MEDICATIONS:  aspirin enteric coated 81 milliGRAM(s) Oral daily  cilostazol 100 milliGRAM(s) Oral two times a day  heparin   Injectable 5000 Unit(s) SubCutaneous every 12 hours  metoprolol tartrate 12.5 milliGRAM(s) Oral two times a day  tamsulosin 0.4 milliGRAM(s) Oral at bedtime    cephalexin 250 milliGRAM(s) Oral daily    ALBUTerol    90 MICROgram(s) HFA Inhaler 2 Puff(s) Inhalation every 6 hours PRN    buPROPion SR (12-Hour) 150 milliGRAM(s) Oral daily  gabapentin 300 milliGRAM(s) Oral two times a day    pantoprazole    Tablet 40 milliGRAM(s) Oral before breakfast          FAMILY HISTORY:  No pertinent family history in first degree relatives        Non-contributory    SOCIAL HISTORY:    pos tobacco     Allergies    contrast media (gadolinium-based) (Rash; Short breath; Hives)  contrast media (iodine-based) (Rash; Short breath; Hives)    Intolerances    	    REVIEW OF SYSTEMS:  as above  The rest of the 14 points ROS reviewed and except above they are unremarkable.        PHYSICAL EXAM:  T(C): 36.7 (07-10-21 @ 04:10), Max: 36.7 (07-10-21 @ 04:10)  HR: 82 (07-10-21 @ 04:10) (63 - 88)  BP: 98/59 (07-10-21 @ 04:10) (92/54 - 144/72)  RR: 16 (07-10-21 @ 04:10) (16 - 18)  SpO2: 97% (07-10-21 @ 04:10) (89% - 98%)  Wt(kg): --  I&O's Summary    09 Jul 2021 07:01  -  10 Jul 2021 07:00  --------------------------------------------------------  IN: 240 mL / OUT: 0 mL / NET: 240 mL    10 Jul 2021 07:01  -  10 Jul 2021 12:19  --------------------------------------------------------  IN: 240 mL / OUT: 0 mL / NET: 240 mL        Appearance: Normal	  HEENT:   no gross abnormality   Cardiovascular: Normal S1 S2,    Murmur:   Neck: JVP normal  Respiratory: Lungs clear   Gastrointestinal:  Soft, Non-tender  Skin: normal   Neuro: No gross deficits.   Psychiatry:  Mood & affect flat  Ext: No edema    LABS/DATA:    TELEMETRY: 	    ECG:  	   	  CARDIAC MARKERS:                                      10.9   13.44 )-----------( 203      ( 10 Jul 2021 06:02 )             31.8     07-10    127<L>  |  95<L>  |  24<H>  ----------------------------<  135<H>  3.6   |  19<L>  |  1.22    Ca    9.6      10 Jul 2021 06:02      proBNP: Serum Pro-Brain Natriuretic Peptide: 376 pg/mL (07-10 @ 06:02)    Lipid Profile:   HgA1c:   TSH:            Trazodone 50mg qhs

## 2021-07-10 NOTE — CONSULT NOTE ADULT - SUBJECTIVE AND OBJECTIVE BOX
Patient is a 76y old  Female who presents with a chief complaint of cabg eval (2021 17:56)      HPI:  This is a 77y/o  female with no known implantable devices noted COVID 19 negative with Pfizer Vaccine last dose 21 with PMHX of Current tobacco smoker on Nicotine patch , COPD , HTN and PVD. Pt had recent stress test that was abnormal on 21 and presented  for cardiac work up/LHC/CABG eval as well as  peripheral bypass surgery . No complaints of CP no sob no palpitations noted. We are consulted for hYponatremia which is secondary to telmisartan/HCTZ use ( now discontinued). Reports No NSAID use. Patient reports no SOB, swelling of legs. She reports some dizziness and getting  cc bolus         PAST MEDICAL & SURGICAL HISTORY:  HTN (hypertension)    Tobacco use    COPD (chronic obstructive pulmonary disease)    PVD (peripheral vascular disease)    No significant past surgical history        MEDICATIONS  (STANDING):  aspirin enteric coated 81 milliGRAM(s) Oral daily  buPROPion SR (12-Hour) 150 milliGRAM(s) Oral daily  cephalexin 250 milliGRAM(s) Oral daily  cilostazol 100 milliGRAM(s) Oral two times a day  gabapentin 300 milliGRAM(s) Oral two times a day  heparin   Injectable 5000 Unit(s) SubCutaneous every 12 hours  nicotine - 21 mG/24Hr(s) Patch 1 patch Transdermal daily  pantoprazole    Tablet 40 milliGRAM(s) Oral before breakfast  tamsulosin 0.4 milliGRAM(s) Oral at bedtime      Allergies    contrast media (gadolinium-based) (Rash; Short breath; Hives)  contrast media (iodine-based) (Rash; Short breath; Hives)    Intolerances        SOCIAL HISTORY:  Denies ETOh,Smoking,     FAMILY HISTORY:  No pertinent family history in first degree relatives        REVIEW OF SYSTEMS:  CONSTITUTIONAL: No weakness, fevers or chills  EYES/ENT: No visual changes;  No vertigo or throat pain   NECK: No pain or stiffness  RESPIRATORY: No cough, wheezing, hemoptysis; No shortness of breath  CARDIOVASCULAR: No chest pain or palpitations  GASTROINTESTINAL: No abdominal or epigastric pain. No nausea, vomiting, or hematemesis; No diarrhea or constipation. No melena or hematochezia.  GENITOURINARY: No dysuria, frequency or hematuria  NEUROLOGICAL: No numbness or weakness  SKIN: No itching, burning, rashes, or lesions   All other review of systems is negative unless indicated above.    VITAL:  T(C): , Max: 36.7 (07-10-21 @ 04:10)  T(F): , Max: 98.1 (07-10-21 @ 04:10)  HR: 81 (07-10-21 @ 13:02)  BP: 72/48 (07-10-21 @ 13:02)  BP(mean): --  RR: 17 (07-10-21 @ 13:02)  SpO2: 94% (07-10-21 @ 13:02)  Wt(kg): --    PHYSICAL EXAM:  Constitutional: NAD, Alert  HEENT: NCAT, MMM  Neck: Supple, No JVD  Respiratory: CTA-b/l  Cardiovascular: RRR s1s2, no m/r/g  Gastrointestinal: BS+, soft, NT/ND  Extremities: No peripheral edema b/l  Neurological: no focal deficits; strength grossly intact  Back: no CVAT b/l  Skin: No rashes, no nevi    LABS:                        10.9   13.44 )-----------( 203      ( 10 Jul 2021 06:02 )             31.8     Na(127)/K(3.6)/Cl(95)/HCO3(19)/BUN(24)/Cr(1.22)Glu(135)/Ca(9.6)/Mg(--)/PO4(--)    07-10 @ 06:02  Na(127)/K(3.9)/Cl(93)/HCO3(20)/BUN(17)/Cr(1.22)Glu(135)/Ca(10.1)/Mg(--)/PO4(--)     @ 13:27    Urinalysis Basic - ( 10 Jul 2021 06:38 )    Color: Light Yellow / Appearance: Clear / S.024 / pH: x  Gluc: x / Ketone: Negative  / Bili: Negative / Urobili: Negative   Blood: x / Protein: Negative / Nitrite: Negative   Leuk Esterase: Negative / RBC: 2 /hpf / WBC 3 /HPF   Sq Epi: x / Non Sq Epi: 3 /hpf / Bacteria: Negative    ASSESSMENT:  77y/o  female with COPD , HTN and PVD recent stress test that was abnormal on 21 and presented  for cardiac work now undergoing up CABG eval as well as peripheral bypass surgery work up    Hyponatremia- secondary to telmisartan/HCTZ use - now discontinued,       RECOMMEND:  Fluid restriction uptil 1. 2 L   Start salt tablets 1 gm BID   Check urine lytes, urine and serum osmolality   Will order am cortisol, TSH   Check orthostatic vital signs     Thank you for involving [facility] in this patient's care.    With warm regards,    Lyric Michel MD   [facility]  Office: [phone]

## 2021-07-10 NOTE — PROGRESS NOTE ADULT - ASSESSMENT
pt  is a 77y/o  female with PMHX of Current tobacco smoker on Nicotine patch , COPD , HTN and PVD.   Pt had recent stress test that was abnormal on 6/22/21  s/p [facility] snd sx recommended   Pt planned for Peripheral bypass surgery .   hyponatremia  renal eval  copd  pulm eval  c/w outpt meds  dvt proph   gi proph  pt  is a 75y/o  female with PMHX of Current tobacco smoker on Nicotine patch , COPD , HTN and PVD.   Pt had recent stress test that was abnormal on 6/22/21  s/p [facility] snd sx recommended   Pt planned for Peripheral bypass surgery .   hyponatremia likely sec to hctz / d/c ed   renal eval  copd  pulm eval  c/w outpt meds  dvt proph   gi proph

## 2021-07-10 NOTE — CONSULT NOTE ADULT - ASSESSMENT
CAD  Triple vessel disease   asa  statin   cont BB  fu with CTS for CABG planning     PVD  surgery on hold  plan as above    tobacco   counseling given   nicotine patch      Advanced care planning was discussed with patient and family.  Risks, benefits and alternatives of the cardiac treatments and medical therapy including procedures were discussed in detail and all questions were answered. Importance of compliance with medical therapy and lifestyle modification to improve cardiovascular health were addressed. Appropriate forms and patient educational materials were reviewed. 30 minutes face to face spent.

## 2021-07-11 LAB
ANION GAP SERPL CALC-SCNC: 11 MMOL/L — SIGNIFICANT CHANGE UP (ref 5–17)
APPEARANCE UR: CLEAR — SIGNIFICANT CHANGE UP
BILIRUB UR-MCNC: NEGATIVE — SIGNIFICANT CHANGE UP
BUN SERPL-MCNC: 21 MG/DL — SIGNIFICANT CHANGE UP (ref 7–23)
CALCIUM SERPL-MCNC: 8.6 MG/DL — SIGNIFICANT CHANGE UP (ref 8.4–10.5)
CHLORIDE SERPL-SCNC: 104 MMOL/L — SIGNIFICANT CHANGE UP (ref 96–108)
CO2 SERPL-SCNC: 19 MMOL/L — LOW (ref 22–31)
COLOR SPEC: COLORLESS — SIGNIFICANT CHANGE UP
CORTIS AM PEAK SERPL-MCNC: 4.6 UG/DL — LOW (ref 6–18.4)
CREAT SERPL-MCNC: 1.26 MG/DL — SIGNIFICANT CHANGE UP (ref 0.5–1.3)
DIFF PNL FLD: NEGATIVE — SIGNIFICANT CHANGE UP
GLUCOSE SERPL-MCNC: 118 MG/DL — HIGH (ref 70–99)
GLUCOSE UR QL: NEGATIVE — SIGNIFICANT CHANGE UP
HCT VFR BLD CALC: 30.2 % — LOW (ref 34.5–45)
HGB BLD-MCNC: 10.2 G/DL — LOW (ref 11.5–15.5)
KETONES UR-MCNC: NEGATIVE — SIGNIFICANT CHANGE UP
LEUKOCYTE ESTERASE UR-ACNC: NEGATIVE — SIGNIFICANT CHANGE UP
MCHC RBC-ENTMCNC: 31 PG — SIGNIFICANT CHANGE UP (ref 27–34)
MCHC RBC-ENTMCNC: 33.8 GM/DL — SIGNIFICANT CHANGE UP (ref 32–36)
MCV RBC AUTO: 91.8 FL — SIGNIFICANT CHANGE UP (ref 80–100)
NITRITE UR-MCNC: NEGATIVE — SIGNIFICANT CHANGE UP
NRBC # BLD: 0 /100 WBCS — SIGNIFICANT CHANGE UP (ref 0–0)
PH UR: 6 — SIGNIFICANT CHANGE UP (ref 5–8)
PLATELET # BLD AUTO: 169 K/UL — SIGNIFICANT CHANGE UP (ref 150–400)
POTASSIUM SERPL-MCNC: 3.2 MMOL/L — LOW (ref 3.5–5.3)
POTASSIUM SERPL-SCNC: 3.2 MMOL/L — LOW (ref 3.5–5.3)
PROT UR-MCNC: NEGATIVE — SIGNIFICANT CHANGE UP
RBC # BLD: 3.29 M/UL — LOW (ref 3.8–5.2)
RBC # FLD: 14 % — SIGNIFICANT CHANGE UP (ref 10.3–14.5)
SODIUM SERPL-SCNC: 134 MMOL/L — LOW (ref 135–145)
SP GR SPEC: 1 — LOW (ref 1.01–1.02)
TSH SERPL-MCNC: 1.65 UIU/ML — SIGNIFICANT CHANGE UP (ref 0.27–4.2)
UROBILINOGEN FLD QL: NEGATIVE — SIGNIFICANT CHANGE UP
WBC # BLD: 9.3 K/UL — SIGNIFICANT CHANGE UP (ref 3.8–10.5)
WBC # FLD AUTO: 9.3 K/UL — SIGNIFICANT CHANGE UP (ref 3.8–10.5)

## 2021-07-11 PROCEDURE — 99233 SBSQ HOSP IP/OBS HIGH 50: CPT | Mod: GC

## 2021-07-11 RX ORDER — FLUTICASONE FUROATE, UMECLIDINIUM BROMIDE AND VILANTEROL TRIFENATATE 200; 62.5; 25 UG/1; UG/1; UG/1
1 POWDER RESPIRATORY (INHALATION) DAILY
Refills: 0 | Status: DISCONTINUED | OUTPATIENT
Start: 2021-07-11 | End: 2021-07-15

## 2021-07-11 RX ORDER — POTASSIUM CHLORIDE 20 MEQ
20 PACKET (EA) ORAL
Refills: 0 | Status: COMPLETED | OUTPATIENT
Start: 2021-07-11 | End: 2021-07-11

## 2021-07-11 RX ORDER — TIOTROPIUM BROMIDE 18 UG/1
1 CAPSULE ORAL; RESPIRATORY (INHALATION) DAILY
Refills: 0 | Status: DISCONTINUED | OUTPATIENT
Start: 2021-07-11 | End: 2021-07-11

## 2021-07-11 RX ORDER — TAMSULOSIN HYDROCHLORIDE 0.4 MG/1
0.4 CAPSULE ORAL AT BEDTIME
Refills: 0 | Status: DISCONTINUED | OUTPATIENT
Start: 2021-07-11 | End: 2021-07-11

## 2021-07-11 RX ORDER — BUDESONIDE AND FORMOTEROL FUMARATE DIHYDRATE 160; 4.5 UG/1; UG/1
2 AEROSOL RESPIRATORY (INHALATION)
Refills: 0 | Status: DISCONTINUED | OUTPATIENT
Start: 2021-07-11 | End: 2021-07-11

## 2021-07-11 RX ORDER — LANOLIN ALCOHOL/MO/W.PET/CERES
5 CREAM (GRAM) TOPICAL ONCE
Refills: 0 | Status: COMPLETED | OUTPATIENT
Start: 2021-07-11 | End: 2021-07-11

## 2021-07-11 RX ORDER — POTASSIUM CHLORIDE 20 MEQ
40 PACKET (EA) ORAL ONCE
Refills: 0 | Status: COMPLETED | OUTPATIENT
Start: 2021-07-11 | End: 2021-07-11

## 2021-07-11 RX ADMIN — FLUTICASONE FUROATE, UMECLIDINIUM BROMIDE AND VILANTEROL TRIFENATATE 1 PUFF(S): 200; 62.5; 25 POWDER RESPIRATORY (INHALATION) at 17:11

## 2021-07-11 RX ADMIN — Medication 5 MILLIGRAM(S): at 21:53

## 2021-07-11 RX ADMIN — Medication 1 PATCH: at 12:29

## 2021-07-11 RX ADMIN — Medication 81 MILLIGRAM(S): at 12:30

## 2021-07-11 RX ADMIN — Medication 20 MILLIEQUIVALENT(S): at 13:17

## 2021-07-11 RX ADMIN — CILOSTAZOL 100 MILLIGRAM(S): 100 TABLET ORAL at 17:13

## 2021-07-11 RX ADMIN — CILOSTAZOL 100 MILLIGRAM(S): 100 TABLET ORAL at 05:11

## 2021-07-11 RX ADMIN — SODIUM CHLORIDE 1 GRAM(S): 9 INJECTION INTRAMUSCULAR; INTRAVENOUS; SUBCUTANEOUS at 17:13

## 2021-07-11 RX ADMIN — GABAPENTIN 300 MILLIGRAM(S): 400 CAPSULE ORAL at 17:12

## 2021-07-11 RX ADMIN — Medication 20 MILLIEQUIVALENT(S): at 15:15

## 2021-07-11 RX ADMIN — HEPARIN SODIUM 5000 UNIT(S): 5000 INJECTION INTRAVENOUS; SUBCUTANEOUS at 17:12

## 2021-07-11 RX ADMIN — Medication 1 PATCH: at 18:13

## 2021-07-11 RX ADMIN — SODIUM CHLORIDE 1 GRAM(S): 9 INJECTION INTRAMUSCULAR; INTRAVENOUS; SUBCUTANEOUS at 05:11

## 2021-07-11 RX ADMIN — HEPARIN SODIUM 5000 UNIT(S): 5000 INJECTION INTRAVENOUS; SUBCUTANEOUS at 05:10

## 2021-07-11 RX ADMIN — BUPROPION HYDROCHLORIDE 150 MILLIGRAM(S): 150 TABLET, EXTENDED RELEASE ORAL at 13:15

## 2021-07-11 RX ADMIN — GABAPENTIN 300 MILLIGRAM(S): 400 CAPSULE ORAL at 05:11

## 2021-07-11 RX ADMIN — Medication 250 MILLIGRAM(S): at 12:30

## 2021-07-11 RX ADMIN — Medication 1 PATCH: at 11:28

## 2021-07-11 RX ADMIN — Medication 1 PATCH: at 07:48

## 2021-07-11 RX ADMIN — PANTOPRAZOLE SODIUM 40 MILLIGRAM(S): 20 TABLET, DELAYED RELEASE ORAL at 05:10

## 2021-07-11 NOTE — PROGRESS NOTE ADULT - ASSESSMENT
pt  is a 77y/o  female with PMHX of Current tobacco smoker on Nicotine patch , COPD , HTN and PVD.   Pt had recent stress test that was abnormal on 6/22/21  s/p [facility] snd sx recommended   Pt planned for Peripheral bypass surgery .   hyponatremia likely sec to hctz / d/c ed   renal f/u  copd  pulm eval/called  c/w outpt meds  dvt proph   gi proph   hypotension  s/p iv fluids  improved  dr brunson to cover me till further notice

## 2021-07-11 NOTE — CONSULT NOTE ADULT - ATTENDING COMMENTS
pt seen and examined with dr. baker  agree with above  pt was seen by Dr. Montelongo in the office on 7/8  PFT showed a normal FEV1, TLC, DLCO of 68  she was continued on Trelegy, and smoking cessation advised.  LDCT was deferred until after procedure  continue pt's Trelegy, albuterol prn  smoking cessation

## 2021-07-11 NOTE — PROGRESS NOTE ADULT - SUBJECTIVE AND OBJECTIVE BOX
DATE OF SERVICE: 07-11-21 @ 10:26    Subjective: Patient seen and examined. No new events except as noted.     SUBJECTIVE/ROS:  Bp on low side       MEDICATIONS:  MEDICATIONS  (STANDING):  aspirin enteric coated 81 milliGRAM(s) Oral daily  buPROPion SR (12-Hour) 150 milliGRAM(s) Oral daily  cephalexin 250 milliGRAM(s) Oral daily  cilostazol 100 milliGRAM(s) Oral two times a day  gabapentin 300 milliGRAM(s) Oral two times a day  heparin   Injectable 5000 Unit(s) SubCutaneous every 12 hours  nicotine - 21 mG/24Hr(s) Patch 1 patch Transdermal daily  pantoprazole    Tablet 40 milliGRAM(s) Oral before breakfast  sodium chloride 1 Gram(s) Oral two times a day      PHYSICAL EXAM:  T(C): 36.7 (07-10-21 @ 20:30), Max: 36.7 (07-10-21 @ 13:02)  HR: 80 (07-10-21 @ 20:30) (78 - 85)  BP: 90/74 (07-11-21 @ 04:35) (72/48 - 98/76)  RR: 17 (07-10-21 @ 20:30) (17 - 17)  SpO2: 94% (07-10-21 @ 20:30) (94% - 94%)  Wt(kg): --  I&O's Summary    10 Jul 2021 07:01  -  11 Jul 2021 07:00  --------------------------------------------------------  IN: 740 mL / OUT: 950 mL / NET: -210 mL            JVP: Normal  Neck: supple  Lung: clear   CV: S1 S2 , Murmur:  Abd: soft  Ext: No edema  neuro: Awake / alert  Psych: flat affect  Skin: normal``    LABS/DATA:    CARDIAC MARKERS:                                10.2   9.30  )-----------( 169      ( 11 Jul 2021 08:18 )             30.2     07-11    134<L>  |  104  |  21  ----------------------------<  118<H>  3.2<L>   |  19<L>  |  1.26    Ca    8.6      11 Jul 2021 08:18      proBNP:   Lipid Profile:   HgA1c:   TSH:     TELE:  EKG:

## 2021-07-11 NOTE — PROVIDER CONTACT NOTE (OTHER) - ASSESSMENT
patient complaining of weakness. ausculted blood pressure of 78/42 on the upper left arm. all other vitals signs WDL. will continue to monitor

## 2021-07-11 NOTE — PROGRESS NOTE ADULT - ASSESSMENT
CAD  Triple vessel disease   asa  statin   fu with CTS for CABG planning     Hypotension   off hctz and BB    PVD  surgery on hold  plan as above    tobacco   counseling given   nicotine patch      Advanced care planning was discussed with patient and family.  Risks, benefits and alternatives of the cardiac treatments and medical therapy including procedures were discussed in detail and all questions were answered. Importance of compliance with medical therapy and lifestyle modification to improve cardiovascular health were addressed. Appropriate forms and patient educational materials were reviewed. 30 minutes face to face spent.

## 2021-07-11 NOTE — PROGRESS NOTE ADULT - SUBJECTIVE AND OBJECTIVE BOX
DATE OF SERVICE: 07-11-21 @ 11:11  CHIEF COMPLAINT:Patient is a 76y old  Female who presents with a chief complaint of cabg eval (09 Jul 2021 17:56)    	        PAST MEDICAL & SURGICAL HISTORY:  HTN (hypertension)    Tobacco use    COPD (chronic obstructive pulmonary disease)    PVD (peripheral vascular disease)    No significant past surgical history            REVIEW OF SYSTEMS:  feewls weak at times  RESPIRATORY: No cough, wheezing, chills or hemoptysis; No Shortness of Breath  CARDIOVASCULAR: No chest pain, palpitations, passing out, dizziness, or leg swelling  GASTROINTESTINAL: No abdominal or epigastric pain. No nausea, vomiting, or hematemesis; No diarrhea or constipation. No melena or hematochezia.  GENITOURINARY: No dysuria, frequency, hematuria, or incontinence  NEUROLOGICAL: No headaches, m  Medications:  MEDICATIONS  (STANDING):  aspirin enteric coated 81 milliGRAM(s) Oral daily  buPROPion SR (12-Hour) 150 milliGRAM(s) Oral daily  cephalexin 250 milliGRAM(s) Oral daily  cilostazol 100 milliGRAM(s) Oral two times a day  gabapentin 300 milliGRAM(s) Oral two times a day  heparin   Injectable 5000 Unit(s) SubCutaneous every 12 hours  nicotine - 21 mG/24Hr(s) Patch 1 patch Transdermal daily  pantoprazole    Tablet 40 milliGRAM(s) Oral before breakfast  potassium chloride    Tablet ER 40 milliEquivalent(s) Oral once  potassium chloride    Tablet ER 20 milliEquivalent(s) Oral every 2 hours  sodium chloride 1 Gram(s) Oral two times a day    MEDICATIONS  (PRN):  ALBUTerol    90 MICROgram(s) HFA Inhaler 2 Puff(s) Inhalation every 6 hours PRN Shortness of Breath    	    PHYSICAL EXAM:  T(C): 36.7 (07-10-21 @ 20:30), Max: 36.7 (07-10-21 @ 13:02)  HR: 80 (07-10-21 @ 20:30) (78 - 85)  BP: 90/74 (07-11-21 @ 04:35) (72/48 - 98/76)  RR: 17 (07-10-21 @ 20:30) (17 - 17)  SpO2: 94% (07-10-21 @ 20:30) (94% - 94%)  Wt(kg): --  I&O's Summary    10 Jul 2021 07:01  -  11 Jul 2021 07:00  --------------------------------------------------------  IN: 740 mL / OUT: 950 mL / NET: -210 mL        Appearance: Normal	  HEENT:   Normal oral mucosa, PERRL, EOMI	  Lymphatic: No lymphadenopathy  Cardiovascular: Normal S1 S2, No JVD,  Respiratory:dec bs   Psychiatry: A & O x 3, Mood & affect appropriate  Gastrointestinal:  Soft, Non-tender, + BS	  Skin: No rashes, No ecchymoses, No cyanosis	  Neurologic: Non-focal  Extremities: Normal range of motion, No clubbing, cyanosis or edema  Vascular: Peripheral pulses palpable 2+ bilaterally    TELEMETRY: 	    ECG:  	  RADIOLOGY:  OTHER: 	  	  LABS:	 	    CARDIAC MARKERS:                                10.2   9.30  )-----------( 169      ( 11 Jul 2021 08:18 )             30.2     07-11    134<L>  |  104  |  21  ----------------------------<  118<H>  3.2<L>   |  19<L>  |  1.26    Ca    8.6      11 Jul 2021 08:18      proBNP:   Lipid Profile:   HgA1c:   TSH:

## 2021-07-11 NOTE — CONSULT NOTE ADULT - SUBJECTIVE AND OBJECTIVE BOX
CHIEF COMPLAINT:    HPI:    PAST MEDICAL & SURGICAL HISTORY:  HTN (hypertension)    Tobacco use    COPD (chronic obstructive pulmonary disease)    PVD (peripheral vascular disease)    No significant past surgical history        FAMILY HISTORY:  No pertinent family history in first degree relatives        SOCIAL HISTORY:  Smoking: [ ] Never Smoked [ ] Former Smoker (__ packs x ___ years) [ ] Current Smoker  (__ packs x ___ years)  Substance Use: [ ] Never Used [ ] Used ____  EtOH Use:  Marital Status: [ ] Single [ ]  [ ]  [ ]   Sexual History:   Occupation:  Recent Travel:  Country of Birth:  Advance Directives:    Allergies    contrast media (gadolinium-based) (Rash; Short breath; Hives)  contrast media (iodine-based) (Rash; Short breath; Hives)    Intolerances        HOME MEDICATIONS:  Home Medications:  Albuterol (Eqv-ProAir HFA) 90 mcg/inh inhalation aerosol: 2 puff(s) inhaled every 6 hours (2021 15:28)  Aspir 81 oral delayed release tablet: 1 tab(s) orally once a day (2021 15:28)  cephalexin 250 mg oral tablet: 1 tab(s) orally once a day (2021 15:28)  cilostazol 100 mg oral tablet: 1 tab(s) orally 2 times a day (2021 15:28)  dipyridamole 50 mg oral tablet: 1 tab(s) orally 3 times a day (2021 15:28)  gabapentin 300 mg oral tablet: 1 tab(s) orally 2 times a day (2021 15:28)  Pepcid 20 mg oral tablet: 1 tab(s) orally 2 times a day, As Needed (2021 15:28)  tamsulosin 0.4 mg oral capsule: 1 cap(s) orally once a day (2021 15:28)  telmisartan-hydrochlorothiazide 40mg-12.5mg oral tablet: 1 tab(s) orally once a day (2021 15:28)  Trelegy Ellipta 100 mcg-62.5 mcg-25 mcg/inh inhalation powder: 1 puff(s) inhaled once a day (2021 15:28)      REVIEW OF SYSTEMS:  Constitutional: [ ] negative [ ] fevers [ ] chills [ ] weight loss [ ] weight gain [ ] malaise  HEENT: [ ] negative [ ] visual disturbances [ ] postnasal drip [ ] nasal congestion [ ] sore throat  CV: [ ] negative [ ] chest pain [ ] palpitations [ ] orthopnea   Resp: [ ] negative [ ] cough [ ] shortness of breath [ ] wheezing [ ] sputum [ ] hemoptysis  GI: [ ] negative [ ] nausea [ ] vomiting [ ] abdominal pain [ ] dysphagia [ ] diarrhea [ ] melena [ ] hematochezia  : [ ] negative [ ] dysuria [ ] nocturia [ ] hematuria [ ] increased urinary frequency  Musculoskeletal: [ ] negative [ ] back pain [ ] myalgias [ ] arthralgias [ ] fracture  Skin: [ ] negative [ ] rash [ ] pruritus  Neurological: [ ] negative [ ] headache [ ] dizziness [ ] syncope [ ] weakness [ ] numbness  Psychiatric: [ ] negative [ ] anxiety [ ] depression  Endocrine: [ ] negative [ ] diabetes [ ] thyroid problem  Hematologic/Lymphatic: [ ] negative [ ] anemia [ ] bleeding problem  Allergic/Immunologic: [ ] hives [ ] angioedema  [ ] All other systems negative  [ ] Unable to assess ROS because ________    OBJECTIVE:  ICU Vital Signs Last 24 Hrs  T(C): 36.7 (10 Jul 2021 20:30), Max: 36.7 (10 Jul 2021 13:02)  T(F): 98.1 (10 Jul 2021 20:30), Max: 98.1 (10 Jul 2021 20:30)  HR: 76 (2021 11:22) (76 - 85)  BP: 127/63 (2021 11:22) (72/48 - 127/63)  BP(mean): --  ABP: --  ABP(mean): --  RR: 17 (10 Jul 2021 20:30) (17 - 17)  SpO2: 94% (10 Jul 2021 20:30) (94% - 94%)        07-10 @ 07:01  -   @ 07:00  --------------------------------------------------------  IN: 740 mL / OUT: 950 mL / NET: -210 mL      CAPILLARY BLOOD GLUCOSE          PHYSICAL EXAM:  General:   HEENT:   Lymph Nodes:  Neck:   Respiratory:   Cardiovascular:   Abdomen:   Extremities:   Skin:   Neurological:  Psychiatry:    LINES:     HOSPITAL MEDICATIONS:  Standing Meds:  aspirin enteric coated 81 milliGRAM(s) Oral daily  buPROPion SR (12-Hour) 150 milliGRAM(s) Oral daily  cephalexin 250 milliGRAM(s) Oral daily  cilostazol 100 milliGRAM(s) Oral two times a day  gabapentin 300 milliGRAM(s) Oral two times a day  heparin   Injectable 5000 Unit(s) SubCutaneous every 12 hours  nicotine - 21 mG/24Hr(s) Patch 1 patch Transdermal daily  pantoprazole    Tablet 40 milliGRAM(s) Oral before breakfast  potassium chloride    Tablet ER 40 milliEquivalent(s) Oral once  potassium chloride    Tablet ER 20 milliEquivalent(s) Oral every 2 hours  sodium chloride 1 Gram(s) Oral two times a day      PRN Meds:  ALBUTerol    90 MICROgram(s) HFA Inhaler 2 Puff(s) Inhalation every 6 hours PRN      LABS:                        10.2   9.30  )-----------( 169      ( 2021 08:18 )             30.2     Hgb Trend: 10.2<--, 10.9<--, 13.0<--  07-11    134<L>  |  104  |  21  ----------------------------<  118<H>  3.2<L>   |  19<L>  |  1.26    Ca    8.6      2021 08:18      Creatinine Trend: 1.26<--, 1.22<--, 1.22<--  PT/INR - ( 10 Jul 2021 06:02 )   PT: 11.1 sec;   INR: 0.92 ratio         PTT - ( 10 Jul 2021 06:02 )  PTT:27.2 sec  Urinalysis Basic - ( 2021 05:45 )    Color: Colorless / Appearance: Clear / S.005 / pH: x  Gluc: x / Ketone: Negative  / Bili: Negative / Urobili: Negative   Blood: x / Protein: Negative / Nitrite: Negative   Leuk Esterase: Negative / RBC: x / WBC x   Sq Epi: x / Non Sq Epi: x / Bacteria: x            MICROBIOLOGY:       RADIOLOGY:  [ ] Reviewed and interpreted by me    PULMONARY FUNCTION TESTS:    EKG: CHIEF COMPLAINT: Coronary artery disease, COPD    HPI: 76F current smoker (>50 py history), treated as COPD but PFT showing air trapping and no obstruction present, HTN, PVD presented for abnormal stress test and 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 Pulmonology consulted for possible COPD. Pt has chronic cough and dyspnea on exertion but no hypoxemia. She is feeling well at the present time. Takes trelegy inhaler at home, has albuterol PRN but uses rarely, follows with pulmonologist Dr. Montelongo.     PAST MEDICAL & SURGICAL HISTORY:  HTN (hypertension)    Tobacco use    COPD (chronic obstructive pulmonary disease)    PVD (peripheral vascular disease)    No significant past surgical history      FAMILY HISTORY:  No pertinent family history in first degree relatives      SOCIAL HISTORY:  Smoking: [ ] Never Smoked [ ] Former Smoker (__ packs x ___ years) [ x ] Current Smoker  (__ packs x ___ years)  Substance Use: [ ] Never Used [ ] Used ____  EtOH Use:  Marital Status: [ ] Single [ ]  [ ]  [ ]   Sexual History:   Occupation:  Recent Travel:  Country of Birth:  Advance Directives:    Allergies    contrast media (gadolinium-based) (Rash; Short breath; Hives)  contrast media (iodine-based) (Rash; Short breath; Hives)    Intolerances      HOME MEDICATIONS:  Home Medications:  Albuterol (Eqv-ProAir HFA) 90 mcg/inh inhalation aerosol: 2 puff(s) inhaled every 6 hours (2021 15:28)  Aspir 81 oral delayed release tablet: 1 tab(s) orally once a day (2021 15:28)  cephalexin 250 mg oral tablet: 1 tab(s) orally once a day (2021 15:28)  cilostazol 100 mg oral tablet: 1 tab(s) orally 2 times a day (2021 15:28)  dipyridamole 50 mg oral tablet: 1 tab(s) orally 3 times a day (2021 15:28)  gabapentin 300 mg oral tablet: 1 tab(s) orally 2 times a day (2021 15:28)  Pepcid 20 mg oral tablet: 1 tab(s) orally 2 times a day, As Needed (2021 15:28)  tamsulosin 0.4 mg oral capsule: 1 cap(s) orally once a day (2021 15:28)  telmisartan-hydrochlorothiazide 40mg-12.5mg oral tablet: 1 tab(s) orally once a day (2021 15:28)  Trelegy Ellipta 100 mcg-62.5 mcg-25 mcg/inh inhalation powder: 1 puff(s) inhaled once a day (2021 15:28)      REVIEW OF SYSTEMS:  Constitutional: [ ] negative [ ] fevers [ ] chills [ ] weight loss [ ] weight gain [ ] malaise  HEENT: [ ] negative [ ] visual disturbances [ ] postnasal drip [ ] nasal congestion [ ] sore throat  CV: [ ] negative [ ] chest pain [ ] palpitations [ ] orthopnea   Resp: [ ] negative [x ] cough [x ] shortness of breath [ ] wheezing [ ] sputum [ ] hemoptysis  GI: [ ] negative [ ] nausea [ ] vomiting [ ] abdominal pain [ ] dysphagia [ ] diarrhea [ ] melena [ ] hematochezia  : [ ] negative [ ] dysuria [ ] nocturia [ ] hematuria [ ] increased urinary frequency  Musculoskeletal: [ ] negative [ ] back pain [ ] myalgias [ ] arthralgias [ ] fracture  Skin: [ ] negative [ ] rash [ ] pruritus  Neurological: [ ] negative [ ] headache [ ] dizziness [ ] syncope [ ] weakness [ ] numbness  Psychiatric: [ ] negative [ ] anxiety [ ] depression  Endocrine: [ ] negative [ ] diabetes [ ] thyroid problem  Hematologic/Lymphatic: [ ] negative [ ] anemia [ ] bleeding problem  Allergic/Immunologic: [ ] hives [ ] angioedema  [ ] All other systems negative  [ ] Unable to assess ROS because ________    OBJECTIVE:  ICU Vital Signs Last 24 Hrs  T(C): 36.7 (10 Jul 2021 20:30), Max: 36.7 (10 Jul 2021 13:02)  T(F): 98.1 (10 Jul 2021 20:30), Max: 98.1 (10 Jul 2021 20:30)  HR: 76 (2021 11:22) (76 - 85)  BP: 127/63 (2021 11:22) (72/48 - 127/63)  BP(mean): --  ABP: --  ABP(mean): --  RR: 17 (10 Jul 2021 20:30) (17 - 17)  SpO2: 94% (10 Jul 2021 20:30) (94% - 94%)        -10 @ 07:  -   @ 07:00  --------------------------------------------------------  IN: 740 mL / OUT: 950 mL / NET: -210 mL      CAPILLARY BLOOD GLUCOSE        PHYSICAL EXAM:  General: NAD, appears comfortable on 2L  Skin: Warm and dry, no rashes  Neuro: AAOx3, nonfocal  HEENT: PERRL, EOMI, no oral lesions  Neck: Full range of motion, no JVD  Lungs: Clear to ascultation bilaterally no wheezes, rales, or rhonchi   Heart: Regular rate and rhythm, no murmurs  Abdomen: Normoactive bowl sounds, soft, nontender, nondistended  Extremities: No lower extremity tenderness, erythema, or edema   Psych: normal mood and affect      LINES:     HOSPITAL MEDICATIONS:  Standing Meds:  aspirin enteric coated 81 milliGRAM(s) Oral daily  buPROPion SR (12-Hour) 150 milliGRAM(s) Oral daily  cephalexin 250 milliGRAM(s) Oral daily  cilostazol 100 milliGRAM(s) Oral two times a day  gabapentin 300 milliGRAM(s) Oral two times a day  heparin   Injectable 5000 Unit(s) SubCutaneous every 12 hours  nicotine - 21 mG/24Hr(s) Patch 1 patch Transdermal daily  pantoprazole    Tablet 40 milliGRAM(s) Oral before breakfast  potassium chloride    Tablet ER 40 milliEquivalent(s) Oral once  potassium chloride    Tablet ER 20 milliEquivalent(s) Oral every 2 hours  sodium chloride 1 Gram(s) Oral two times a day      PRN Meds:  ALBUTerol    90 MICROgram(s) HFA Inhaler 2 Puff(s) Inhalation every 6 hours PRN      LABS:                        10.2   9.30  )-----------( 169      ( 2021 08:18 )             30.2     Hgb Trend: 10.2<--, 10.9<--, 13.0<--  07-11    134<L>  |  104  |  21  ----------------------------<  118<H>  3.2<L>   |  19<L>  |  1.26    Ca    8.6      2021 08:18      Creatinine Trend: 1.26<--, 1.22<--, 1.22<--  PT/INR - ( 10 Jul 2021 06:02 )   PT: 11.1 sec;   INR: 0.92 ratio         PTT - ( 10 Jul 2021 06:02 )  PTT:27.2 sec  Urinalysis Basic - ( 2021 05:45 )    Color: Colorless / Appearance: Clear / S.005 / pH: x  Gluc: x / Ketone: Negative  / Bili: Negative / Urobili: Negative   Blood: x / Protein: Negative / Nitrite: Negative   Leuk Esterase: Negative / RBC: x / WBC x   Sq Epi: x / Non Sq Epi: x / Bacteria: x

## 2021-07-11 NOTE — PROGRESS NOTE ADULT - ASSESSMENT
No pain, no shortness of breath    Review of systems: All 10 points ROS was obtained except as above.     ALBUTerol    90 MICROgram(s) HFA Inhaler 2 Puff(s) Inhalation every 6 hours PRN  aspirin enteric coated 81 milliGRAM(s) Oral daily  buPROPion SR (12-Hour) 150 milliGRAM(s) Oral daily  cephalexin 250 milliGRAM(s) Oral daily  cilostazol 100 milliGRAM(s) Oral two times a day  gabapentin 300 milliGRAM(s) Oral two times a day  heparin   Injectable 5000 Unit(s) SubCutaneous every 12 hours  nicotine - 21 mG/24Hr(s) Patch 1 patch Transdermal daily  pantoprazole    Tablet 40 milliGRAM(s) Oral before breakfast  sodium chloride 1 Gram(s) Oral two times a day      VITAL:  T(C): , Max: 36.7 (07-10-21 @ 13:02)  T(F): , Max: 98.1 (07-10-21 @ 20:30)  HR: 80 (07-10-21 @ 20:30)  BP: 90/74 (21 @ 04:35)  BP(mean): --  RR: 17 (07-10-21 @ 20:30)  SpO2: 94% (07-10-21 @ 20:30)  Wt(kg): --    07-10-21 @ 07:01  -  21 @ 07:00  --------------------------------------------------------  IN: 740 mL / OUT: 950 mL / NET: -210 mL        PHYSICAL EXAM:  Constitutional: NAD, Alert  HEENT: NCAT, MMM  Neck: Supple, No JVD  Respiratory: CTA-b/l  Cardiovascular: RRR s1s2, no m/r/g  Gastrointestinal: BS+, soft, NT/ND  Extremities: No peripheral edema b/l  Neurological: no focal deficits; strength grossly intact  Back: no CVAT b/l  Skin: No rashes, no nevi      LABS:                          10.2   9.30  )-----------( 169      ( 2021 08:18 )             30.2     Na(134)/K(3.2)/Cl(104)/HCO3(19)/BUN(21)/Cr(1.26)Glu(118)/Ca(8.6)/Mg(--)/PO4(--)     @ 08:18  Na(127)/K(3.6)/Cl(95)/HCO3(19)/BUN(24)/Cr(1.22)Glu(135)/Ca(9.6)/Mg(--)/PO4(--)    07-10 @ 06:02  Na(127)/K(3.9)/Cl(93)/HCO3(20)/BUN(17)/Cr(1.22)Glu(135)/Ca(10.1)/Mg(--)/PO4(--)     @ 13:27    Urinalysis Basic - ( 2021 05:45 )    Color: Colorless / Appearance: Clear / S.005 / pH: x  Gluc: x / Ketone: Negative  / Bili: Negative / Urobili: Negative   Blood: x / Protein: Negative / Nitrite: Negative   Leuk Esterase: Negative / RBC: x / WBC x   Sq Epi: x / Non Sq Epi: x / Bacteria: x      Chloride, Random Urine: <35 mmol/L (07-10 @ 17:34)  Sodium, Random Urine: 36 mmol/L (07-10 @ 17:34)  Osmolality, Random Urine: 227 mos/kg (07-10 @ 17:34)        ASSESSMENT/PLAN  77y/o  female with COPD , HTN and PVD recent stress test that was abnormal on 21 and presented  for cardiac work now undergoing up CABG eval as well as peripheral bypass surgery work up    Hyponatremia- secondary to telmisartan/HCTZ use - now discontinued,       RECOMMEND:  Fluid restriction uptil 1. 2 L   Start salt tablets 1 gm BID   Check urine lytes, urine and serum osmolality   Will order am cortisol, TSH   Check orthostatic vital signs       Felix France, NP-BC  [facility]  [phone]   Seen and examined at bedside  nasal cannula 2L  Denies complaints  orthostatics  not done yet  AM cortisol pending    ALBUTerol    90 MICROgram(s) HFA Inhaler 2 Puff(s) Inhalation every 6 hours PRN  aspirin enteric coated 81 milliGRAM(s) Oral daily  buPROPion SR (12-Hour) 150 milliGRAM(s) Oral daily  cephalexin 250 milliGRAM(s) Oral daily  cilostazol 100 milliGRAM(s) Oral two times a day  gabapentin 300 milliGRAM(s) Oral two times a day  heparin   Injectable 5000 Unit(s) SubCutaneous every 12 hours  nicotine - 21 mG/24Hr(s) Patch 1 patch Transdermal daily  pantoprazole    Tablet 40 milliGRAM(s) Oral before breakfast  sodium chloride 1 Gram(s) Oral two times a day      VITAL:  T(C): , Max: 36.7 (07-10-21 @ 13:02)  T(F): , Max: 98.1 (07-10-21 @ 20:30)  HR: 80 (07-10-21 @ 20:30)  BP: 90/74 (21 @ 04:35)  BP(mean): --  RR: 17 (07-10-21 @ 20:30)  SpO2: 94% (07-10-21 @ 20:30)  Wt(kg): --    07-10-21 @ 07:01  -  21 @ 07:00  --------------------------------------------------------  IN: 740 mL / OUT: 950 mL / NET: -210 mL        PHYSICAL EXAM:  Constitutional: NAD, Alert  HEENT: NCAT, MMM  Neck: Supple, No JVD  Respiratory: CTA-b/l  Cardiovascular: RRR s1s2, no m/r/g  Gastrointestinal: BS+, soft, NT/ND  : +primatfit  Extremities: No peripheral edema b/l  Neurological: no focal deficits; strength grossly intact  Back: no CVAT b/l  Skin: No rashes, no nevi      LABS:                          10.2   9.30  )-----------( 169      ( 2021 08:18 )             30.2     Na(134)/K(3.2)/Cl(104)/HCO3(19)/BUN(21)/Cr(1.26)Glu(118)/Ca(8.6)/Mg(--)/PO4(--)     @ 08:18  Na(127)/K(3.6)/Cl(95)/HCO3(19)/BUN(24)/Cr(1.22)Glu(135)/Ca(9.6)/Mg(--)/PO4(--)    07-10 @ 06:02  Na(127)/K(3.9)/Cl(93)/HCO3(20)/BUN(17)/Cr(1.22)Glu(135)/Ca(10.1)/Mg(--)/PO4(--)     @ 13:27    Urinalysis Basic - ( 2021 05:45 )    Color: Colorless / Appearance: Clear / S.005 / pH: x  Gluc: x / Ketone: Negative  / Bili: Negative / Urobili: Negative   Blood: x / Protein: Negative / Nitrite: Negative   Leuk Esterase: Negative / RBC: x / WBC x   Sq Epi: x / Non Sq Epi: x / Bacteria: x      Chloride, Random Urine: <35 mmol/L (07-10 @ 17:34)  Sodium, Random Urine: 36 mmol/L (07-10 @ 17:34)  Osmolality, Random Urine: 227 mos/kg (07-10 @ 17:34)        ASSESSMENT/PLAN  75y/o  female with COPD , HTN and PVD recent stress test that was abnormal on 21 and presented  for cardiac work now undergoing up CABG eval as well as peripheral bypass surgery work up    Hyponatremia- secondary to telmisartan/HCTZ use - now discontinued,  Na+ improving  Renal fxn intact    RECOMMEND:  Fluid restriction uptil 1. 2 L   Start salt tablets 1 gm BID   am cortisol pending, TSH pending  Check orthostatic vital signs       Felix France, NP-BC  [facility]  [phone]

## 2021-07-11 NOTE — CONSULT NOTE ADULT - ASSESSMENT
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 Pulmonology consulted for possible COPD. Pt now on 2L O2 but may not be needed, not currently in exacerbation.     Problems:  COPD    Recommendation  - Continue trelegy 1 puff daily which pt has at bedside, please have RN bring down to pharmacy so can document in EMR that pt is actually taking  - Albuterol nebulizer q6h PRN  - Maintain O2 saturation > 88%, if she does not desaturate with ambulation then no need for O2    Call pulmonologist Dr. Klein for further questions if needed      Chris Larson MD  Fellow, Pulmonary and Critical Care Medicine  [facility]  Pager: [phone], [phone] ([facility])  Email: [email or web address]

## 2021-07-12 LAB
ANION GAP SERPL CALC-SCNC: 10 MMOL/L — SIGNIFICANT CHANGE UP (ref 5–17)
BUN SERPL-MCNC: 19 MG/DL — SIGNIFICANT CHANGE UP (ref 7–23)
CALCIUM SERPL-MCNC: 8.9 MG/DL — SIGNIFICANT CHANGE UP (ref 8.4–10.5)
CHLORIDE SERPL-SCNC: 106 MMOL/L — SIGNIFICANT CHANGE UP (ref 96–108)
CO2 SERPL-SCNC: 19 MMOL/L — LOW (ref 22–31)
CREAT SERPL-MCNC: 1.34 MG/DL — HIGH (ref 0.5–1.3)
GLUCOSE SERPL-MCNC: 88 MG/DL — SIGNIFICANT CHANGE UP (ref 70–99)
HCT VFR BLD CALC: 29 % — LOW (ref 34.5–45)
HGB BLD-MCNC: 9.6 G/DL — LOW (ref 11.5–15.5)
MCHC RBC-ENTMCNC: 30.9 PG — SIGNIFICANT CHANGE UP (ref 27–34)
MCHC RBC-ENTMCNC: 33.1 GM/DL — SIGNIFICANT CHANGE UP (ref 32–36)
MCV RBC AUTO: 93.2 FL — SIGNIFICANT CHANGE UP (ref 80–100)
NRBC # BLD: 0 /100 WBCS — SIGNIFICANT CHANGE UP (ref 0–0)
PLATELET # BLD AUTO: 156 K/UL — SIGNIFICANT CHANGE UP (ref 150–400)
POTASSIUM SERPL-MCNC: 4.1 MMOL/L — SIGNIFICANT CHANGE UP (ref 3.5–5.3)
POTASSIUM SERPL-SCNC: 4.1 MMOL/L — SIGNIFICANT CHANGE UP (ref 3.5–5.3)
RBC # BLD: 3.11 M/UL — LOW (ref 3.8–5.2)
RBC # FLD: 14.3 % — SIGNIFICANT CHANGE UP (ref 10.3–14.5)
SODIUM SERPL-SCNC: 135 MMOL/L — SIGNIFICANT CHANGE UP (ref 135–145)
WBC # BLD: 8.29 K/UL — SIGNIFICANT CHANGE UP (ref 3.8–10.5)
WBC # FLD AUTO: 8.29 K/UL — SIGNIFICANT CHANGE UP (ref 3.8–10.5)

## 2021-07-12 PROCEDURE — 93306 TTE W/DOPPLER COMPLETE: CPT | Mod: 26

## 2021-07-12 PROCEDURE — 93930 UPPER EXTREMITY STUDY: CPT | Mod: 26

## 2021-07-12 PROCEDURE — 71250 CT THORAX DX C-: CPT | Mod: 26

## 2021-07-12 RX ORDER — LANOLIN ALCOHOL/MO/W.PET/CERES
5 CREAM (GRAM) TOPICAL ONCE
Refills: 0 | Status: COMPLETED | OUTPATIENT
Start: 2021-07-12 | End: 2021-07-12

## 2021-07-12 RX ORDER — ACETAMINOPHEN 500 MG
650 TABLET ORAL EVERY 6 HOURS
Refills: 0 | Status: DISCONTINUED | OUTPATIENT
Start: 2021-07-12 | End: 2021-07-15

## 2021-07-12 RX ADMIN — Medication 650 MILLIGRAM(S): at 17:23

## 2021-07-12 RX ADMIN — Medication 650 MILLIGRAM(S): at 18:51

## 2021-07-12 RX ADMIN — HEPARIN SODIUM 5000 UNIT(S): 5000 INJECTION INTRAVENOUS; SUBCUTANEOUS at 05:19

## 2021-07-12 RX ADMIN — BUPROPION HYDROCHLORIDE 150 MILLIGRAM(S): 150 TABLET, EXTENDED RELEASE ORAL at 14:15

## 2021-07-12 RX ADMIN — Medication 1 PATCH: at 11:43

## 2021-07-12 RX ADMIN — BUPROPION HYDROCHLORIDE 150 MILLIGRAM(S): 150 TABLET, EXTENDED RELEASE ORAL at 08:05

## 2021-07-12 RX ADMIN — SODIUM CHLORIDE 1 GRAM(S): 9 INJECTION INTRAMUSCULAR; INTRAVENOUS; SUBCUTANEOUS at 17:14

## 2021-07-12 RX ADMIN — Medication 1 PATCH: at 19:00

## 2021-07-12 RX ADMIN — SODIUM CHLORIDE 1 GRAM(S): 9 INJECTION INTRAMUSCULAR; INTRAVENOUS; SUBCUTANEOUS at 05:18

## 2021-07-12 RX ADMIN — GABAPENTIN 300 MILLIGRAM(S): 400 CAPSULE ORAL at 05:18

## 2021-07-12 RX ADMIN — GABAPENTIN 300 MILLIGRAM(S): 400 CAPSULE ORAL at 17:14

## 2021-07-12 RX ADMIN — Medication 1 PATCH: at 11:46

## 2021-07-12 RX ADMIN — HEPARIN SODIUM 5000 UNIT(S): 5000 INJECTION INTRAVENOUS; SUBCUTANEOUS at 17:14

## 2021-07-12 RX ADMIN — PANTOPRAZOLE SODIUM 40 MILLIGRAM(S): 20 TABLET, DELAYED RELEASE ORAL at 05:18

## 2021-07-12 RX ADMIN — Medication 5 MILLIGRAM(S): at 22:23

## 2021-07-12 RX ADMIN — Medication 81 MILLIGRAM(S): at 11:46

## 2021-07-12 RX ADMIN — Medication 1 PATCH: at 07:49

## 2021-07-12 RX ADMIN — CILOSTAZOL 100 MILLIGRAM(S): 100 TABLET ORAL at 17:14

## 2021-07-12 RX ADMIN — Medication 250 MILLIGRAM(S): at 11:46

## 2021-07-12 RX ADMIN — CILOSTAZOL 100 MILLIGRAM(S): 100 TABLET ORAL at 05:19

## 2021-07-12 NOTE — PROGRESS NOTE ADULT - SUBJECTIVE AND OBJECTIVE BOX
No acute evnts noted        VITAL:  T(C): , Max: 36.9 (21 @ 00:11)  T(F): , Max: 98.4 (21 @ 00:11)  HR: 77 (21 @ 11:31)  BP: 122/65 (21 @ 11:31)  BP(mean): --  RR: 18 (21 @ 11:31)  SpO2: 94% (21 @ 11:31)  Wt(kg): --      PHYSICAL EXAM:  General: Alerted, oriented  HEENT: MMM  Lungs:CTA-b/l  Heart: RRR S1S2  Abdomen: Soft, NTND  Ext: no pedal edema b/l  : no mooer      LABS:                        9.6    8.29  )-----------( 156      ( 2021 06:04 )             29.0     Na(135)/K(4.1)/Cl(106)/HCO3(19)/BUN(19)/Cr(1.34)Glu(88)/Ca(8.9)/Mg(--)/PO4(--)     @ 06:02  Na(134)/K(3.2)/Cl(104)/HCO3(19)/BUN(21)/Cr(1.26)Glu(118)/Ca(8.6)/Mg(--)/PO4(--)     @ 08:18  Na(127)/K(3.6)/Cl(95)/HCO3(19)/BUN(24)/Cr(1.22)Glu(135)/Ca(9.6)/Mg(--)/PO4(--)    07-10 @ 06:02    Urinalysis Basic - ( 2021 05:45 )    Color: Colorless / Appearance: Clear / S.005 / pH: x  Gluc: x / Ketone: Negative  / Bili: Negative / Urobili: Negative   Blood: x / Protein: Negative / Nitrite: Negative   Leuk Esterase: Negative / RBC: x / WBC x   Sq Epi: x / Non Sq Epi: x / Bacteria: x      Chloride, Random Urine: <35 mmol/L (07-10 @ 17:34)  Sodium, Random Urine: 36 mmol/L (07-10 @ 17:34)  Osmolality, Random Urine: 227 mos/kg (07-10 @ 17:34)      75y/o  female with COPD , HTN and PVD recent stress test that was abnormal on 21 and presented  for cardiac work now undergoing up CABG eval as well as peripheral bypass surgery work up    Hyponatremia- secondary to telmisartan/HCTZ use - now discontinued,  Na+ improving, TSH normal, cortisol low   Non oliguric ZAC - Cr slightly high today     RECOMMEND:  Fluid restriction uptil 1. 2 L   c/w  salt tablets 1 gm BID   Monitor Cr for now         Lyric Michel MD  [facility]  Office: [phone]

## 2021-07-12 NOTE — CHART NOTE - NSCHARTNOTEFT_GEN_A_CORE
Received a call from Radiologist re: abnormal echo result    Small pericardial effusion without tamponade; however, 1.1 cm organized collection anterior to the RV noted.    Discussed with Cards, CT chest no contrast ordered for further workup    1. Calcified trileaflet aortic valve with normal opening.  2. Hyperdynamic left ventricular systolic function.  3. Normal right ventricular size and function.  4. Thickened pericardium. Pericardial fat pad noted. Small  pericardial effusion posterior to  the LV and RV.  There is  1.1cm organized collection anterior to the RV. No tamponade  seen.    HD stable    - CT chest ordered

## 2021-07-12 NOTE — PROGRESS NOTE ADULT - ASSESSMENT
pt  is a 75y/o  female with PMHX of Current tobacco smoker on Nicotine patch , COPD , HTN and PVD.   Pt had recent stress test that was abnormal on 6/22/21  s/p [facility] snd sx recommended  Echo abnormal. For CT chest   Pt planned for Peripheral bypass surgery on hold.   hyponatremia likely sec to hctz / d/c ed  Fluid restriction  renal f/u  copd  pulm eval/called  c/w outpt meds  dvt proph   gi proph   hypotension improving   s/p iv fluids  d/w p[atient and son    Jovani Boyce MD pager [phone]

## 2021-07-12 NOTE — PROGRESS NOTE ADULT - SUBJECTIVE AND OBJECTIVE BOX
DATE OF SERVICE: 07-12-21 @ 07:05    Subjective: Patient seen and examined. No new events except as noted.     SUBJECTIVE/ROS:  No chest pain, dyspnea, palpitation, or dizziness.       MEDICATIONS:  MEDICATIONS  (STANDING):  aspirin enteric coated 81 milliGRAM(s) Oral daily  buPROPion SR (12-Hour) 150 milliGRAM(s) Oral two times a day  cephalexin 250 milliGRAM(s) Oral daily  cilostazol 100 milliGRAM(s) Oral two times a day  fluticasone furoate/umeclidinium/vilanterol 100-62.5-25 MICROgram(s) Inhaler 1 Puff(s) Inhalation daily  gabapentin 300 milliGRAM(s) Oral two times a day  heparin   Injectable 5000 Unit(s) SubCutaneous every 12 hours  nicotine - 21 mG/24Hr(s) Patch 1 patch Transdermal daily  pantoprazole    Tablet 40 milliGRAM(s) Oral before breakfast  sodium chloride 1 Gram(s) Oral two times a day      PHYSICAL EXAM:  T(C): 36.8 (07-12-21 @ 06:31), Max: 36.9 (07-12-21 @ 00:11)  HR: 74 (07-12-21 @ 06:31) (65 - 86)  BP: 115/63 (07-12-21 @ 06:31) (104/61 - 129/55)  RR: 18 (07-12-21 @ 06:31) (16 - 18)  SpO2: 95% (07-12-21 @ 06:31) (93% - 97%)  Wt(kg): --  I&O's Summary    11 Jul 2021 07:01  -  12 Jul 2021 07:00  --------------------------------------------------------  IN: 720 mL / OUT: 2700 mL / NET: -1980 mL            JVP: Normal  Neck: supple  Lung: clear   CV: S1 S2 , Murmur:  Abd: soft  Ext: No edema  neuro: Awake / alert  Psych: flat affect  Skin: normal``    LABS/DATA:    CARDIAC MARKERS:                                9.6    8.29  )-----------( 156      ( 12 Jul 2021 06:04 )             29.0     07-12    135  |  106  |  19  ----------------------------<  88  4.1   |  19<L>  |  1.34<H>    Ca    8.9      12 Jul 2021 06:02      proBNP:   Lipid Profile:   HgA1c:   TSH: Thyroid Stimulating Hormone, Serum: 1.65 uIU/mL (07-11 @ 12:23)      TELE:  EKG:

## 2021-07-12 NOTE — PROGRESS NOTE ADULT - ASSESSMENT
CAD  Triple vessel disease   asa  statin   fu with CTS for CABG planning     Hypotension   off hctz and BB  stable now     PVD  surgery on hold  plan as above    tobacco   counseling given   nicotine patch     COPD  plan as per pulm

## 2021-07-13 PROBLEM — I10 ESSENTIAL (PRIMARY) HYPERTENSION: Chronic | Status: ACTIVE | Noted: 2021-07-09

## 2021-07-13 PROBLEM — I73.9 PERIPHERAL VASCULAR DISEASE, UNSPECIFIED: Chronic | Status: ACTIVE | Noted: 2021-07-09

## 2021-07-13 LAB
ANION GAP SERPL CALC-SCNC: 12 MMOL/L — SIGNIFICANT CHANGE UP (ref 5–17)
APPEARANCE UR: CLEAR — SIGNIFICANT CHANGE UP
BILIRUB UR-MCNC: NEGATIVE — SIGNIFICANT CHANGE UP
BUN SERPL-MCNC: 19 MG/DL — SIGNIFICANT CHANGE UP (ref 7–23)
CALCIUM SERPL-MCNC: 9.3 MG/DL — SIGNIFICANT CHANGE UP (ref 8.4–10.5)
CHLORIDE SERPL-SCNC: 103 MMOL/L — SIGNIFICANT CHANGE UP (ref 96–108)
CO2 SERPL-SCNC: 19 MMOL/L — LOW (ref 22–31)
COLOR SPEC: SIGNIFICANT CHANGE UP
CREAT SERPL-MCNC: 1.3 MG/DL — SIGNIFICANT CHANGE UP (ref 0.5–1.3)
DIFF PNL FLD: NEGATIVE — SIGNIFICANT CHANGE UP
GLUCOSE SERPL-MCNC: 96 MG/DL — SIGNIFICANT CHANGE UP (ref 70–99)
GLUCOSE UR QL: NEGATIVE — SIGNIFICANT CHANGE UP
HCT VFR BLD CALC: 28.4 % — LOW (ref 34.5–45)
HGB BLD-MCNC: 9.6 G/DL — LOW (ref 11.5–15.5)
KETONES UR-MCNC: NEGATIVE — SIGNIFICANT CHANGE UP
LEUKOCYTE ESTERASE UR-ACNC: NEGATIVE — SIGNIFICANT CHANGE UP
MCHC RBC-ENTMCNC: 31.5 PG — SIGNIFICANT CHANGE UP (ref 27–34)
MCHC RBC-ENTMCNC: 33.8 GM/DL — SIGNIFICANT CHANGE UP (ref 32–36)
MCV RBC AUTO: 93.1 FL — SIGNIFICANT CHANGE UP (ref 80–100)
NITRITE UR-MCNC: NEGATIVE — SIGNIFICANT CHANGE UP
NRBC # BLD: 0 /100 WBCS — SIGNIFICANT CHANGE UP (ref 0–0)
PH UR: 7 — SIGNIFICANT CHANGE UP (ref 5–8)
PLATELET # BLD AUTO: 160 K/UL — SIGNIFICANT CHANGE UP (ref 150–400)
POTASSIUM SERPL-MCNC: 3.9 MMOL/L — SIGNIFICANT CHANGE UP (ref 3.5–5.3)
POTASSIUM SERPL-SCNC: 3.9 MMOL/L — SIGNIFICANT CHANGE UP (ref 3.5–5.3)
PROT UR-MCNC: NEGATIVE — SIGNIFICANT CHANGE UP
RBC # BLD: 3.05 M/UL — LOW (ref 3.8–5.2)
RBC # FLD: 14.2 % — SIGNIFICANT CHANGE UP (ref 10.3–14.5)
SODIUM SERPL-SCNC: 134 MMOL/L — LOW (ref 135–145)
SP GR SPEC: 1.01 — SIGNIFICANT CHANGE UP (ref 1.01–1.02)
TSH RECEP AB FLD-ACNC: <1.1 IU/L — SIGNIFICANT CHANGE UP (ref 0–1.75)
UROBILINOGEN FLD QL: NEGATIVE — SIGNIFICANT CHANGE UP
WBC # BLD: 7.76 K/UL — SIGNIFICANT CHANGE UP (ref 3.8–10.5)
WBC # FLD AUTO: 7.76 K/UL — SIGNIFICANT CHANGE UP (ref 3.8–10.5)

## 2021-07-13 PROCEDURE — 99232 SBSQ HOSP IP/OBS MODERATE 35: CPT

## 2021-07-13 RX ORDER — SENNA PLUS 8.6 MG/1
2 TABLET ORAL AT BEDTIME
Refills: 0 | Status: DISCONTINUED | OUTPATIENT
Start: 2021-07-13 | End: 2021-07-15

## 2021-07-13 RX ORDER — POLYETHYLENE GLYCOL 3350 17 G/17G
17 POWDER, FOR SOLUTION ORAL
Refills: 0 | Status: DISCONTINUED | OUTPATIENT
Start: 2021-07-13 | End: 2021-07-15

## 2021-07-13 RX ADMIN — CILOSTAZOL 100 MILLIGRAM(S): 100 TABLET ORAL at 06:22

## 2021-07-13 RX ADMIN — ALBUTEROL 2 PUFF(S): 90 AEROSOL, METERED ORAL at 21:16

## 2021-07-13 RX ADMIN — Medication 1 PATCH: at 11:13

## 2021-07-13 RX ADMIN — BUPROPION HYDROCHLORIDE 150 MILLIGRAM(S): 150 TABLET, EXTENDED RELEASE ORAL at 14:07

## 2021-07-13 RX ADMIN — HEPARIN SODIUM 5000 UNIT(S): 5000 INJECTION INTRAVENOUS; SUBCUTANEOUS at 06:23

## 2021-07-13 RX ADMIN — Medication 1 PATCH: at 06:24

## 2021-07-13 RX ADMIN — SENNA PLUS 2 TABLET(S): 8.6 TABLET ORAL at 21:16

## 2021-07-13 RX ADMIN — Medication 650 MILLIGRAM(S): at 09:19

## 2021-07-13 RX ADMIN — GABAPENTIN 300 MILLIGRAM(S): 400 CAPSULE ORAL at 17:48

## 2021-07-13 RX ADMIN — Medication 1 PATCH: at 19:43

## 2021-07-13 RX ADMIN — CILOSTAZOL 100 MILLIGRAM(S): 100 TABLET ORAL at 17:47

## 2021-07-13 RX ADMIN — Medication 81 MILLIGRAM(S): at 11:12

## 2021-07-13 RX ADMIN — POLYETHYLENE GLYCOL 3350 17 GRAM(S): 17 POWDER, FOR SOLUTION ORAL at 17:54

## 2021-07-13 RX ADMIN — Medication 250 MILLIGRAM(S): at 11:13

## 2021-07-13 RX ADMIN — POLYETHYLENE GLYCOL 3350 17 GRAM(S): 17 POWDER, FOR SOLUTION ORAL at 09:23

## 2021-07-13 RX ADMIN — SODIUM CHLORIDE 1 GRAM(S): 9 INJECTION INTRAMUSCULAR; INTRAVENOUS; SUBCUTANEOUS at 06:22

## 2021-07-13 RX ADMIN — HEPARIN SODIUM 5000 UNIT(S): 5000 INJECTION INTRAVENOUS; SUBCUTANEOUS at 17:48

## 2021-07-13 RX ADMIN — Medication 650 MILLIGRAM(S): at 08:49

## 2021-07-13 RX ADMIN — GABAPENTIN 300 MILLIGRAM(S): 400 CAPSULE ORAL at 06:22

## 2021-07-13 RX ADMIN — Medication 1 PATCH: at 11:26

## 2021-07-13 RX ADMIN — PANTOPRAZOLE SODIUM 40 MILLIGRAM(S): 20 TABLET, DELAYED RELEASE ORAL at 06:22

## 2021-07-13 RX ADMIN — FLUTICASONE FUROATE, UMECLIDINIUM BROMIDE AND VILANTEROL TRIFENATATE 1 PUFF(S): 200; 62.5; 25 POWDER RESPIRATORY (INHALATION) at 11:27

## 2021-07-13 RX ADMIN — BUPROPION HYDROCHLORIDE 150 MILLIGRAM(S): 150 TABLET, EXTENDED RELEASE ORAL at 08:49

## 2021-07-13 RX ADMIN — SODIUM CHLORIDE 1 GRAM(S): 9 INJECTION INTRAMUSCULAR; INTRAVENOUS; SUBCUTANEOUS at 17:48

## 2021-07-13 NOTE — PROGRESS NOTE ADULT - SUBJECTIVE AND OBJECTIVE BOX
CHIEF COMPLAINT: f/up sob, preop resp, copd--    Interval Events:    REVIEW OF SYSTEMS:  Constitutional: No fevers or chills. No weight loss. No fatigue or generalized malaise.  Eyes: No itching or discharge from the eyes  ENT: No ear pain. No ear discharge. No nasal congestion. No post nasal drip. No epistaxis. No throat pain. No sore throat. No difficulty swallowing.   CV: No chest pain. No palpitations. No lightheadedness or dizziness.   Resp: No dyspnea at rest. No dyspnea on exertion. No orthopnea. No wheezing. No cough. No stridor. No sputum production. No chest pain with respiration.  GI: No nausea. No vomiting. No diarrhea.  MSK: No joint pain or pain in any extremities  Integumentary: No skin lesions. No pedal edema.  Neurological: No gross motor weakness. No sensory changes.  [ ] All other systems negative  [ ] Unable to assess ROS because ________    OBJECTIVE:  ICU Vital Signs Last 24 Hrs  T(C): 36.7 (2021 04:44), Max: 36.8 (2021 06:31)  T(F): 98 (2021 04:44), Max: 98.2 (2021 06:31)  HR: 74 (2021 04:44) (74 - 79)  BP: 109/60 (2021 04:44) (105/61 - 122/65)  BP(mean): --  ABP: --  ABP(mean): --  RR: 16 (2021 04:44) (16 - 18)  SpO2: 92% (2021 04:44) (92% - 95%)        11 @ 07:  -  12 @ 07:00  --------------------------------------------------------  IN: 720 mL / OUT: 2700 mL / NET: -1980 mL     @ 07:13 @ 05:12  --------------------------------------------------------  IN: 120 mL / OUT: 1400 mL / NET: -1280 mL      CAPILLARY BLOOD GLUCOSE          PHYSICAL EXAM:  General: Awake, alert, oriented X 3.   HEENT: Atraumatic, normocephalic.                 Mallampatti Grade                 No nasal congestion.                No tonsillar or pharyngeal exudates.  Lymph Nodes: No palpable lymphadenopathy  Neck: No JVD. No carotid bruit.   Respiratory: Normal chest expansion                         Normal percussion                         Normal and equal air entry                         No wheeze, rhonchi or rales.  Cardiovascular: S1 S2 normal. No murmurs, rubs or gallops.   Abdomen: Soft, non-tender, non-distended. No organomegaly. Normoactive bowel sounds.  Extremities: Warm to touch. Peripheral pulse palpable. No pedal edema.   Skin: No rashes or skin lesions  Neurological: Motor and sensory examination equal and normal in all four extremities.  Psychiatry: Appropriate mood and affect.    HOSPITAL MEDICATIONS:  MEDICATIONS  (STANDING):  aspirin enteric coated 81 milliGRAM(s) Oral daily  buPROPion SR (12-Hour) 150 milliGRAM(s) Oral two times a day  cephalexin 250 milliGRAM(s) Oral daily  cilostazol 100 milliGRAM(s) Oral two times a day  fluticasone furoate/umeclidinium/vilanterol 100-62.5-25 MICROgram(s) Inhaler 1 Puff(s) Inhalation daily  gabapentin 300 milliGRAM(s) Oral two times a day  heparin   Injectable 5000 Unit(s) SubCutaneous every 12 hours  nicotine - 21 mG/24Hr(s) Patch 1 patch Transdermal daily  pantoprazole    Tablet 40 milliGRAM(s) Oral before breakfast  sodium chloride 1 Gram(s) Oral two times a day    MEDICATIONS  (PRN):  acetaminophen   Tablet .. 650 milliGRAM(s) Oral every 6 hours PRN Mild Pain (1 - 3), Moderate Pain (4 - 6)  ALBUTerol    90 MICROgram(s) HFA Inhaler 2 Puff(s) Inhalation every 6 hours PRN Shortness of Breath      LABS:                        9.6    8.29  )-----------( 156      ( 2021 06:04 )             29.0     07-12    135  |  106  |  19  ----------------------------<  88  4.1   |  19<L>  |  1.34<H>    Ca    8.9      2021 06:02        Urinalysis Basic - ( 2021 05:45 )    Color: Colorless / Appearance: Clear / S.005 / pH: x  Gluc: x / Ketone: Negative  / Bili: Negative / Urobili: Negative   Blood: x / Protein: Negative / Nitrite: Negative   Leuk Esterase: Negative / RBC: x / WBC x   Sq Epi: x / Non Sq Epi: x / Bacteria: x            MICROBIOLOGY:     RADIOLOGY:  [ ] Reviewed and interpreted by me    Point of Care Ultrasound Findings:    PFT:    EKG: CHIEF COMPLAINT: f/up sob, preop resp, copd--mucusy in am, not walking due to bp issues    Interval Events: nephro/cards f/up    REVIEW OF SYSTEMS:  Constitutional: No fevers or chills. No weight loss. + fatigue or generalized malaise.  Eyes: No itching or discharge from the eyes  ENT: No ear pain. No ear discharge. No nasal congestion. No post nasal drip. No epistaxis. No throat pain. No sore throat. No difficulty swallowing.   CV: No chest pain. No palpitations. No lightheadedness or dizziness.   Resp: No dyspnea at rest. No dyspnea on exertion. No orthopnea. No wheezing. + cough. No stridor. No sputum production. No chest pain with respiration.  GI: No nausea. No vomiting. No diarrhea.  MSK: No joint pain or pain in any extremities  Integumentary: No skin lesions. No pedal edema.  Neurological: No gross motor weakness. No sensory changes.  [+ ] All other systems negative  [ ] Unable to assess ROS because ________    OBJECTIVE:  ICU Vital Signs Last 24 Hrs  T(C): 36.7 (2021 04:44), Max: 36.8 (2021 06:31)  T(F): 98 (2021 04:44), Max: 98.2 (2021 06:31)  HR: 74 (2021 04:44) (74 - 79)  BP: 109/60 (2021 04:44) (105/61 - 122/65)  BP(mean): --  ABP: --  ABP(mean): --  RR: 16 (2021 04:44) (16 - 18)  SpO2: 92% (2021 04:44) (92% - 95%)        11 @ 07:  -  -12 @ 07:00  --------------------------------------------------------  IN: 720 mL / OUT: 2700 mL / NET: -1980 mL     @ 07:01  -  -13 @ 05:12  --------------------------------------------------------  IN: 120 mL / OUT: 1400 mL / NET: -1280 mL      CAPILLARY BLOOD GLUCOSE          PHYSICAL EXAM: NAD in bed  General: Awake, alert, oriented X 3.   HEENT: Atraumatic, normocephalic.                 Mallampatti Grade 2                No nasal congestion.                No tonsillar or pharyngeal exudates.  Lymph Nodes: No palpable lymphadenopathy  Neck: No JVD. No carotid bruit.   Respiratory: Normal chest expansion                         Normal percussion                         Normal and equal air entry                         + wheeze and rhonchi but no rales.  Cardiovascular: S1 S2 normal. No murmurs, rubs or gallops.   Abdomen: Soft, non-tender, non-distended. No organomegaly. Normoactive bowel sounds.  Extremities: Warm to touch. Peripheral pulse palpable. No pedal edema.   Skin: No rashes or skin lesions  Neurological: Motor and sensory examination equal and normal in all four extremities.  Psychiatry: Appropriate mood and affect.    HOSPITAL MEDICATIONS:  MEDICATIONS  (STANDING):  aspirin enteric coated 81 milliGRAM(s) Oral daily  buPROPion SR (12-Hour) 150 milliGRAM(s) Oral two times a day  cephalexin 250 milliGRAM(s) Oral daily  cilostazol 100 milliGRAM(s) Oral two times a day  fluticasone furoate/umeclidinium/vilanterol 100-62.5-25 MICROgram(s) Inhaler 1 Puff(s) Inhalation daily  gabapentin 300 milliGRAM(s) Oral two times a day  heparin   Injectable 5000 Unit(s) SubCutaneous every 12 hours  nicotine - 21 mG/24Hr(s) Patch 1 patch Transdermal daily  pantoprazole    Tablet 40 milliGRAM(s) Oral before breakfast  sodium chloride 1 Gram(s) Oral two times a day    MEDICATIONS  (PRN):  acetaminophen   Tablet .. 650 milliGRAM(s) Oral every 6 hours PRN Mild Pain (1 - 3), Moderate Pain (4 - 6)  ALBUTerol    90 MICROgram(s) HFA Inhaler 2 Puff(s) Inhalation every 6 hours PRN Shortness of Breath      LABS:                        9.6    8.29  )-----------( 156      ( 2021 06:04 )             29.0     07-12    135  |  106  |  19  ----------------------------<  88  4.1   |  19<L>  |  1.34<H>    Ca    8.9      2021 06:02        Urinalysis Basic - ( 2021 05:45 )    Color: Colorless / Appearance: Clear / S.005 / pH: x  Gluc: x / Ketone: Negative  / Bili: Negative / Urobili: Negative   Blood: x / Protein: Negative / Nitrite: Negative   Leuk Esterase: Negative / RBC: x / WBC x   Sq Epi: x / Non Sq Epi: x / Bacteria: x            MICROBIOLOGY:     RADIOLOGY: < from: CT Chest No Cont (21 @ 20:39) >    INTERPRETATION:  Irregular pericardial thickening most prominent in the anterior, left apical, most inferior portions, which may represent small pericardial effusions, however pericarditis is an additional consideration.    A few faint peripheral patchy groundglass opacities in the left upper lobe of uncertain etiology. Emphysema. Left adrenal thickening and left adrenal versus left upper pole renal nodularity.    Follow-up official report.            < end of copied text >    [ ] Reviewed and interpreted by me    Point of Care Ultrasound Findings:    PFT:    EKG:

## 2021-07-13 NOTE — PROGRESS NOTE ADULT - SUBJECTIVE AND OBJECTIVE BOX
DATE OF SERVICE: 07-13-21 @ 09:07    Subjective: Patient seen and examined. No new events except as noted.     SUBJECTIVE/ROS:  feels ok       MEDICATIONS:  MEDICATIONS  (STANDING):  aspirin enteric coated 81 milliGRAM(s) Oral daily  buPROPion SR (12-Hour) 150 milliGRAM(s) Oral two times a day  cephalexin 250 milliGRAM(s) Oral daily  cilostazol 100 milliGRAM(s) Oral two times a day  fluticasone furoate/umeclidinium/vilanterol 100-62.5-25 MICROgram(s) Inhaler 1 Puff(s) Inhalation daily  gabapentin 300 milliGRAM(s) Oral two times a day  heparin   Injectable 5000 Unit(s) SubCutaneous every 12 hours  nicotine - 21 mG/24Hr(s) Patch 1 patch Transdermal daily  pantoprazole    Tablet 40 milliGRAM(s) Oral before breakfast  polyethylene glycol 3350 17 Gram(s) Oral two times a day  senna 2 Tablet(s) Oral at bedtime  sodium chloride 1 Gram(s) Oral two times a day      PHYSICAL EXAM:  T(C): 36.7 (07-13-21 @ 04:44), Max: 36.7 (07-13-21 @ 04:44)  HR: 74 (07-13-21 @ 04:44) (74 - 79)  BP: 109/60 (07-13-21 @ 04:44) (105/61 - 122/65)  RR: 16 (07-13-21 @ 04:44) (16 - 18)  SpO2: 92% (07-13-21 @ 04:44) (92% - 95%)  Wt(kg): --  I&O's Summary    12 Jul 2021 07:01  -  13 Jul 2021 07:00  --------------------------------------------------------  IN: 120 mL / OUT: 1400 mL / NET: -1280 mL            JVP: Normal  Neck: supple  Lung: clear   CV: S1 S2 , Murmur:  Abd: soft  Ext: No edema  neuro: Awake / alert  Psych: flat affect  Skin: normal``    LABS/DATA:    CARDIAC MARKERS:      < from: Transthoracic Echocardiogram (07.12.21 @ 08:00) >  ------------------------------------------------------------------------  Conclusions:  1. Calcified trileaflet aortic valve with normal opening.  2. Hyperdynamic left ventricular systolic function.  3. Normal right ventricular size and function.  4. Thickened pericardium. Pericardial fat pad noted. Small  pericardial effusion posterior to  the LV and RV.  There is  1.1cm organized collection anterior to the RV. No tamponade  seen.  Discussed with Caryn HILL  *** No previous Echo exam.  ------------------------------------------------------------------------  Confirmed on  7/12/2021 - 16:54:58 by QUINTIN Pardo  ------------------------------------------------------------------------    < end of copied text >  < from: CT Chest No Cont (07.12.21 @ 20:39) >    ******PRELIMINARY REPORT******    ******PRELIMINARY REPORT******          EXAM:  CT CHEST                            PROCEDURE DATE:  07/12/2021      ******PRELIMINARY REPORT******    ******PRELIMINARY REPORT******              INTERPRETATION:  Irregular pericardial thickening most prominent in the anterior, left apical, most inferior portions, which may represent small pericardial effusions, however pericarditis is an additional consideration.    A few faint peripheral patchy groundglass opacities in the left upper lobe of uncertain etiology. Emphysema. Left adrenal thickening and left adrenal versus left upper pole renal nodularity.    Follow-up official report.    < end of copied text >                            9.6    7.76  )-----------( 160      ( 13 Jul 2021 05:42 )             28.4     07-13    134<L>  |  103  |  19  ----------------------------<  96  3.9   |  19<L>  |  1.30    Ca    9.3      13 Jul 2021 05:42      proBNP:   Lipid Profile:   HgA1c:   TSH:     TELE:  EKG:

## 2021-07-13 NOTE — PROGRESS NOTE ADULT - ATTENDING COMMENTS
pt seen and examined with dr. baker  agree with above  pt was seen by Dr. Montelongo in the office on 7/8  PFT showed a normal FEV1, TLC, DLCO of 68  she was continued on Trelegy, and smoking cessation advised.  LDCT was deferred until after procedure  continue pt's Trelegy, albuterol prn  smoking cessation as above:  Multifactorial dyspnea--COPD-CB/emphysema (pt was seen by Dr. Montelongo in the office on 7/8   PFT showed a normal FEV1, TLC, DLCO of 68), CAD, debility--keep sat above 90%  COPD/CB/emphysejma--trelegy 1 q day, incentive spirometry, acapella, albuterol q 6  nicotine addiction-- smoking cessation advised.  lung CA screening---LDCT was to be done, will need f/up ct in 6 months  CAD for [facility] 7/15         preop--no absolute pulm contras to [facility] at this time though pt at risk for PNA/bronchitis etc.  DVT and GI prophylaxis     renal f/up    Isrrael Klein MD-Pulmonary   [phone]

## 2021-07-13 NOTE — PROGRESS NOTE ADULT - ASSESSMENT
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 Pulmonology consulted for possible COPD. Pt now on 2L O2 but may not be needed, not currently in exacerbation.     Problems:  COPD    Recommendation  - Continue trelegy 1 puff daily which pt has at bedside, please have RN bring down to pharmacy so can document in EMR that pt is actually taking  - Albuterol nebulizer q6h PRN  - Maintain O2 saturation > 88%, if she does not desaturate with ambulation then no need for O2    Call pulmonologist Dr. 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 Pulmonology consulted for possible COPD. Pt now on 2L O2 but may not be needed, not currently in exacerbation.     Problems:  COPD        Recommendation  - Continue trelegy 1 puff daily which pt has at bedside, please have RN bring down to pharmacy so can document in EMR that pt is actually taking  - Albuterol nebulizer q6h PRN  - Maintain O2 saturation > 88%, if she does not desaturate with ambulation then no need for O2/ acapella/incentive spirometry  *****************  7/13-mucusy in am--surgery 7/15                           (see below)

## 2021-07-13 NOTE — PROGRESS NOTE ADULT - ASSESSMENT
CAD  Triple vessel disease   asa  statin   fu with [facility] for CABG planning     Hypotension   off hctz and BB  stable now     PVD  surgery on hold  plan as above    tobacco   counseling given   nicotine patch     COPD  plan as per pulm     Pericardial effusion   small  appears to be chronic  no clinical evidence of tamponade

## 2021-07-13 NOTE — PROGRESS NOTE ADULT - ASSESSMENT
Seen and examined at bedside.  Denies complaints  CABG tentatively scheduled for 7/15 tentatively    acetaminophen   Tablet .. 650 milliGRAM(s) Oral every 6 hours PRN  ALBUTerol    90 MICROgram(s) HFA Inhaler 2 Puff(s) Inhalation every 6 hours PRN  aspirin enteric coated 81 milliGRAM(s) Oral daily  buPROPion SR (12-Hour) 150 milliGRAM(s) Oral two times a day  cephalexin 250 milliGRAM(s) Oral daily  cilostazol 100 milliGRAM(s) Oral two times a day  fluticasone furoate/umeclidinium/vilanterol 100-62.5-25 MICROgram(s) Inhaler 1 Puff(s) Inhalation daily  gabapentin 300 milliGRAM(s) Oral two times a day  heparin   Injectable 5000 Unit(s) SubCutaneous every 12 hours  nicotine - 21 mG/24Hr(s) Patch 1 patch Transdermal daily  pantoprazole    Tablet 40 milliGRAM(s) Oral before breakfast  polyethylene glycol 3350 17 Gram(s) Oral two times a day  senna 2 Tablet(s) Oral at bedtime  sodium chloride 1 Gram(s) Oral two times a day      VITAL:  T(C): , Max: 36.7 (07-13-21 @ 04:44)  T(F): , Max: 98 (07-13-21 @ 04:44)  HR: 71 (07-13-21 @ 11:43)  BP: 109/60 (07-13-21 @ 11:43)  BP(mean): --  RR: 16 (07-13-21 @ 11:43)  SpO2: 95% (07-13-21 @ 11:43)  Wt(kg): --    07-12-21 @ 07:01  -  07-13-21 @ 07:00  --------------------------------------------------------  IN: 120 mL / OUT: 1400 mL / NET: -1280 mL    07-13-21 @ 07:01  -  07-13-21 @ 13:14  --------------------------------------------------------  IN: 120 mL / OUT: 0 mL / NET: 120 mL        PHYSICAL EXAM:  General: Alerted, oriented  HEENT: MMM  Lungs:CTA-b/l  Heart: RRR S1S2  Abdomen: Soft, NTND  Ext: no pedal edema b/l  : no moore    LABS:                          9.6    7.76  )-----------( 160      ( 13 Jul 2021 05:42 )             28.4     Na(134)/K(3.9)/Cl(103)/HCO3(19)/BUN(19)/Cr(1.30)Glu(96)/Ca(9.3)/Mg(--)/PO4(--)    07-13 @ 05:42  Na(135)/K(4.1)/Cl(106)/HCO3(19)/BUN(19)/Cr(1.34)Glu(88)/Ca(8.9)/Mg(--)/PO4(--)    07-12 @ 06:02  Na(134)/K(3.2)/Cl(104)/HCO3(19)/BUN(21)/Cr(1.26)Glu(118)/Ca(8.6)/Mg(--)/PO4(--)    07-11 @ 08:18            ASSESSMENT/PLAN  77y/o  female with COPD , HTN and PVD recent stress test that was abnormal on 6/22/21 and presented  for cardiac work now undergoing up CABG eval as well as peripheral bypass surgery work up    Hyponatremia(mild now)- secondary to telmisartan/HCTZ use - now discontinued,  , TSH normal, cortisol low   Non oliguric ZAC -  renal fxn resolving    RECOMMEND:  Fluid restriction uptil 1. 2 L   c/w  salt tablets 1 gm BID   Monitor Cr for now       Felix France NP-BC  [facility]  [phone]

## 2021-07-13 NOTE — PROGRESS NOTE ADULT - ASSESSMENT
pt  is a 77y/o  female with PMHX of Current tobacco smoker on Nicotine patch , COPD , HTN and PVD.   Pt had recent stress test that was abnormal on 6/22/21  s/p c snd sx recommended  Echo noted  ct chest noted   Pt planned for Peripheral bypass surgery]\  hyponatremia likely sec to hctz  improved  renal f/u  copd  pulm f/u noted  c/w outpt meds  dvt proph   gi proph   hypotension improving   s/p iv fluids

## 2021-07-13 NOTE — PROGRESS NOTE ADULT - SUBJECTIVE AND OBJECTIVE BOX
DATE OF SERVICE: 07-13-21 @ 11:12  CHIEF COMPLAINT:Patient is a 76y old  Female who presents with a chief complaint of sob (13 Jul 2021 05:12)    	        PAST MEDICAL & SURGICAL HISTORY:  HTN (hypertension)    Tobacco use    COPD (chronic obstructive pulmonary disease)    PVD (peripheral vascular disease)    No significant past surgical history            REVIEW OF SYSTEMS:    RESPIRATORY: No cough, wheezing, chills or hemoptysis; No Shortness of Breath  CARDIOVASCULAR: No chest pain, palpitations, passing out,   GASTROINTESTINAL: No abdominal or epigastric pain. No nausea, vomiting, or hematemesis; No diarrhea or constipation. No melena or hematochezia.  GENITOURINARY: No dysuria, frequency, hematuria, or incontinence  NEUROLOGICAL: No headaches,     Medications:  MEDICATIONS  (STANDING):  aspirin enteric coated 81 milliGRAM(s) Oral daily  buPROPion SR (12-Hour) 150 milliGRAM(s) Oral two times a day  cephalexin 250 milliGRAM(s) Oral daily  cilostazol 100 milliGRAM(s) Oral two times a day  fluticasone furoate/umeclidinium/vilanterol 100-62.5-25 MICROgram(s) Inhaler 1 Puff(s) Inhalation daily  gabapentin 300 milliGRAM(s) Oral two times a day  heparin   Injectable 5000 Unit(s) SubCutaneous every 12 hours  nicotine - 21 mG/24Hr(s) Patch 1 patch Transdermal daily  pantoprazole    Tablet 40 milliGRAM(s) Oral before breakfast  polyethylene glycol 3350 17 Gram(s) Oral two times a day  senna 2 Tablet(s) Oral at bedtime  sodium chloride 1 Gram(s) Oral two times a day    MEDICATIONS  (PRN):  acetaminophen   Tablet .. 650 milliGRAM(s) Oral every 6 hours PRN Mild Pain (1 - 3), Moderate Pain (4 - 6)  ALBUTerol    90 MICROgram(s) HFA Inhaler 2 Puff(s) Inhalation every 6 hours PRN Shortness of Breath    	    PHYSICAL EXAM:  T(C): 36.7 (07-13-21 @ 04:44), Max: 36.7 (07-13-21 @ 04:44)  HR: 74 (07-13-21 @ 04:44) (74 - 79)  BP: 109/60 (07-13-21 @ 04:44) (105/61 - 122/65)  RR: 16 (07-13-21 @ 04:44) (16 - 18)  SpO2: 92% (07-13-21 @ 04:44) (92% - 95%)  Wt(kg): --  I&O's Summary    12 Jul 2021 07:01  -  13 Jul 2021 07:00  --------------------------------------------------------  IN: 120 mL / OUT: 1400 mL / NET: -1280 mL    13 Jul 2021 07:01  -  13 Jul 2021 11:12  --------------------------------------------------------  IN: 120 mL / OUT: 0 mL / NET: 120 mL        Appearance: Normal	  HEENT:   Normal oral mucosa, PERRL, EOMI	  Lymphatic: No lymphadenopathy  Cardiovascular: Normal S1 S2, No JVD,  Respiratory: Lungs clear to auscultation	  Psychiatry: A & O  Gastrointestinal:  Soft, Non-tender, + BS	  Skin: No rashes, No ecchymoses, No cyanosis	  Neurologic: Non-focal  Extremities: Normal range of motion, No clubbing, cyanosis or edema  Vascular: Peripheral pulses palpable 2+ bilaterally    TELEMETRY: 	    ECG:  	  RADIOLOGY:  OTHER: 	  	  LABS:	 	    CARDIAC MARKERS:                                9.6    7.76  )-----------( 160      ( 13 Jul 2021 05:42 )             28.4     07-13    134<L>  |  103  |  19  ----------------------------<  96  3.9   |  19<L>  |  1.30    Ca    9.3      13 Jul 2021 05:42      proBNP:   Lipid Profile:   HgA1c:   TSH:

## 2021-07-13 NOTE — CHART NOTE - NSCHARTNOTEFT_GEN_A_CORE
CABG scheduled confirmed with [facility]. First case on Thursday 7/17/21.  NPO pMN on Wed     Caryn Luis PA-C CABG scheduled confirmed with [facility]. First case on Thursday 7/15/21.  NPO pMN on Wed     Caryn Luis PA-C

## 2021-07-14 DIAGNOSIS — I25.10 ATHEROSCLEROTIC HEART DISEASE OF NATIVE CORONARY ARTERY WITHOUT ANGINA PECTORIS: ICD-10-CM

## 2021-07-14 LAB
A1C WITH ESTIMATED AVERAGE GLUCOSE RESULT: 5.4 % — SIGNIFICANT CHANGE UP (ref 4–5.6)
ANION GAP SERPL CALC-SCNC: 13 MMOL/L — SIGNIFICANT CHANGE UP (ref 5–17)
BLD GP AB SCN SERPL QL: NEGATIVE — SIGNIFICANT CHANGE UP
BUN SERPL-MCNC: 14 MG/DL — SIGNIFICANT CHANGE UP (ref 7–23)
CALCIUM SERPL-MCNC: 9.8 MG/DL — SIGNIFICANT CHANGE UP (ref 8.4–10.5)
CHLORIDE SERPL-SCNC: 104 MMOL/L — SIGNIFICANT CHANGE UP (ref 96–108)
CO2 SERPL-SCNC: 18 MMOL/L — LOW (ref 22–31)
CREAT SERPL-MCNC: 1.25 MG/DL — SIGNIFICANT CHANGE UP (ref 0.5–1.3)
ESTIMATED AVERAGE GLUCOSE: 108 MG/DL — SIGNIFICANT CHANGE UP (ref 68–114)
FIBRINOGEN PPP-MCNC: 932 MG/DL — HIGH (ref 290–520)
GLUCOSE SERPL-MCNC: 108 MG/DL — HIGH (ref 70–99)
MAGNESIUM SERPL-MCNC: 2.2 MG/DL — SIGNIFICANT CHANGE UP (ref 1.6–2.6)
MRSA PCR RESULT.: SIGNIFICANT CHANGE UP
PHOSPHATE SERPL-MCNC: 3.7 MG/DL — SIGNIFICANT CHANGE UP (ref 2.5–4.5)
POTASSIUM SERPL-MCNC: 4.4 MMOL/L — SIGNIFICANT CHANGE UP (ref 3.5–5.3)
POTASSIUM SERPL-SCNC: 4.4 MMOL/L — SIGNIFICANT CHANGE UP (ref 3.5–5.3)
RH IG SCN BLD-IMP: POSITIVE — SIGNIFICANT CHANGE UP
S AUREUS DNA NOSE QL NAA+PROBE: SIGNIFICANT CHANGE UP
SARS-COV-2 RNA SPEC QL NAA+PROBE: SIGNIFICANT CHANGE UP
SODIUM SERPL-SCNC: 135 MMOL/L — SIGNIFICANT CHANGE UP (ref 135–145)

## 2021-07-14 PROCEDURE — 99232 SBSQ HOSP IP/OBS MODERATE 35: CPT

## 2021-07-14 RX ORDER — CHLORHEXIDINE GLUCONATE 213 G/1000ML
1 SOLUTION TOPICAL ONCE
Refills: 0 | Status: COMPLETED | OUTPATIENT
Start: 2021-07-14 | End: 2021-07-14

## 2021-07-14 RX ORDER — CEFUROXIME AXETIL 250 MG
1500 TABLET ORAL ONCE
Refills: 0 | Status: DISCONTINUED | OUTPATIENT
Start: 2021-07-14 | End: 2021-07-15

## 2021-07-14 RX ORDER — METOPROLOL TARTRATE 50 MG
12.5 TABLET ORAL
Refills: 0 | Status: DISCONTINUED | OUTPATIENT
Start: 2021-07-14 | End: 2021-07-15

## 2021-07-14 RX ORDER — LANOLIN ALCOHOL/MO/W.PET/CERES
3 CREAM (GRAM) TOPICAL AT BEDTIME
Refills: 0 | Status: DISCONTINUED | OUTPATIENT
Start: 2021-07-14 | End: 2021-07-15

## 2021-07-14 RX ORDER — ATORVASTATIN CALCIUM 80 MG/1
20 TABLET, FILM COATED ORAL AT BEDTIME
Refills: 0 | Status: DISCONTINUED | OUTPATIENT
Start: 2021-07-14 | End: 2021-07-15

## 2021-07-14 RX ORDER — CHLORHEXIDINE GLUCONATE 213 G/1000ML
30 SOLUTION TOPICAL ONCE
Refills: 0 | Status: COMPLETED | OUTPATIENT
Start: 2021-07-14 | End: 2021-07-15

## 2021-07-14 RX ADMIN — HEPARIN SODIUM 5000 UNIT(S): 5000 INJECTION INTRAVENOUS; SUBCUTANEOUS at 06:15

## 2021-07-14 RX ADMIN — Medication 12.5 MILLIGRAM(S): at 17:20

## 2021-07-14 RX ADMIN — SODIUM CHLORIDE 1 GRAM(S): 9 INJECTION INTRAMUSCULAR; INTRAVENOUS; SUBCUTANEOUS at 06:15

## 2021-07-14 RX ADMIN — PANTOPRAZOLE SODIUM 40 MILLIGRAM(S): 20 TABLET, DELAYED RELEASE ORAL at 06:15

## 2021-07-14 RX ADMIN — Medication 1 PATCH: at 11:37

## 2021-07-14 RX ADMIN — ALBUTEROL 2 PUFF(S): 90 AEROSOL, METERED ORAL at 06:14

## 2021-07-14 RX ADMIN — BUPROPION HYDROCHLORIDE 150 MILLIGRAM(S): 150 TABLET, EXTENDED RELEASE ORAL at 09:43

## 2021-07-14 RX ADMIN — Medication 3 MILLIGRAM(S): at 21:45

## 2021-07-14 RX ADMIN — ALBUTEROL 2 PUFF(S): 90 AEROSOL, METERED ORAL at 11:40

## 2021-07-14 RX ADMIN — FLUTICASONE FUROATE, UMECLIDINIUM BROMIDE AND VILANTEROL TRIFENATATE 1 PUFF(S): 200; 62.5; 25 POWDER RESPIRATORY (INHALATION) at 11:39

## 2021-07-14 RX ADMIN — Medication 250 MILLIGRAM(S): at 11:38

## 2021-07-14 RX ADMIN — BUPROPION HYDROCHLORIDE 150 MILLIGRAM(S): 150 TABLET, EXTENDED RELEASE ORAL at 13:49

## 2021-07-14 RX ADMIN — CILOSTAZOL 100 MILLIGRAM(S): 100 TABLET ORAL at 06:15

## 2021-07-14 RX ADMIN — Medication 1 PATCH: at 07:40

## 2021-07-14 RX ADMIN — Medication 650 MILLIGRAM(S): at 02:32

## 2021-07-14 RX ADMIN — SODIUM CHLORIDE 1 GRAM(S): 9 INJECTION INTRAMUSCULAR; INTRAVENOUS; SUBCUTANEOUS at 17:16

## 2021-07-14 RX ADMIN — GABAPENTIN 300 MILLIGRAM(S): 400 CAPSULE ORAL at 17:15

## 2021-07-14 RX ADMIN — ATORVASTATIN CALCIUM 20 MILLIGRAM(S): 80 TABLET, FILM COATED ORAL at 21:46

## 2021-07-14 RX ADMIN — Medication 1 PATCH: at 11:30

## 2021-07-14 RX ADMIN — Medication 650 MILLIGRAM(S): at 11:42

## 2021-07-14 RX ADMIN — CILOSTAZOL 100 MILLIGRAM(S): 100 TABLET ORAL at 17:16

## 2021-07-14 RX ADMIN — Medication 81 MILLIGRAM(S): at 11:38

## 2021-07-14 RX ADMIN — CHLORHEXIDINE GLUCONATE 1 APPLICATION(S): 213 SOLUTION TOPICAL at 21:44

## 2021-07-14 RX ADMIN — Medication 650 MILLIGRAM(S): at 02:02

## 2021-07-14 RX ADMIN — HEPARIN SODIUM 5000 UNIT(S): 5000 INJECTION INTRAVENOUS; SUBCUTANEOUS at 17:15

## 2021-07-14 RX ADMIN — Medication 650 MILLIGRAM(S): at 12:00

## 2021-07-14 RX ADMIN — GABAPENTIN 300 MILLIGRAM(S): 400 CAPSULE ORAL at 06:15

## 2021-07-14 RX ADMIN — Medication 1 PATCH: at 19:18

## 2021-07-14 NOTE — PRE-ANESTHESIA EVALUATION ADULT - NSANTHOSAYNRD_GEN_A_CORE
No. ROYA screening performed.  STOP BANG Legend: 0-2 = LOW Risk; 3-4 = INTERMEDIATE Risk; 5-8 = HIGH Risk

## 2021-07-14 NOTE — PROGRESS NOTE ADULT - ASSESSMENT
pt  is a 77y/o  female with PMHX of Current tobacco smoker on Nicotine patch , COPD , HTN and PVD.   Pt had recent stress test that was abnormal on 6/22/21  s/p c snd sx recommended  Echo noted  ct chest noted   Pt planned for Peripheral bypass surgery]\  hyponatremia likely sec to hctz  improved  renal f/u  copd  pulm f/u noted  c/w outpt meds  dvt proph   gi proph   hypotension improved   sx in am        pt  is a 77y/o  female with PMHX of Current tobacco smoker on Nicotine patch , COPD , HTN and PVD.   Pt had recent stress test that was abnormal on 6/22/21  s/p c snd sx recommended  Echo noted  ct chest noted   Pt planned for Peripheral bypass surgery]\  hyponatremia likely sec to hctz  improved  renal f/u  copd  pulm f/u noted  c/w outpt meds  dvt proph   gi proph   hypotension improved   sx in am       dr brunson to cover me till further notice

## 2021-07-14 NOTE — PRE-ANESTHESIA EVALUATION ADULT - NSRADCARDRESULTSFT_GEN_ALL_CORE
Nuclear scan and Echo performed.  Normal LV and RV function.  Mild mitral regurgitation.  Normal diastolic function.    Cath shows LM:   --  LM: Normal.  LAD:   --  Proximal LAD: There was a 70 % stenosis.  CX:   --  Proximal circumflex: There was a 90 % stenosis.  RCA:   --  Proximal RCA: There was a diffuse 90 % stenosis.  --  Mid RCA: There was a diffuse 90 % stenosis.

## 2021-07-14 NOTE — PROGRESS NOTE ADULT - SUBJECTIVE AND OBJECTIVE BOX
No acute events noted       VITAL:  T(C): , Max: 36.7 (21 @ 21:14)  T(F): , Max: 98 (21 @ 21:14)  HR: 71 (21 @ 04:53)  BP: 107/62 (21 @ 04:53)  BP(mean): --  RR: 17 (21 @ 04:53)  SpO2: 93% (21 @ 04:53)  Wt(kg): --      PHYSICAL EXAM:  General: Alerted, oriented  HEENT: MMM  Lungs:CTA-b/l  Heart: RRR S1S2  Abdomen: Soft, NTND  Ext: no pedal edema b/l  : no moore      LABS:                        9.6    7.76  )-----------( 160      ( 2021 05:42 )             28.4     Na(134)/K(3.9)/Cl(103)/HCO3(19)/BUN(19)/Cr(1.30)Glu(96)/Ca(9.3)/Mg(--)/PO4(--)     @ 05:42  Na(135)/K(4.1)/Cl(106)/HCO3(19)/BUN(19)/Cr(1.34)Glu(88)/Ca(8.9)/Mg(--)/PO4(--)     @ 06:02  Na(134)/K(3.2)/Cl(104)/HCO3(19)/BUN(21)/Cr(1.26)Glu(118)/Ca(8.6)/Mg(--)/PO4(--)     @ 08:18    Urinalysis Basic - ( 2021 18:35 )    Color: Light Yellow / Appearance: Clear / S.010 / pH: x  Gluc: x / Ketone: Negative  / Bili: Negative / Urobili: Negative   Blood: x / Protein: Negative / Nitrite: Negative   Leuk Esterase: Negative / RBC: x / WBC x   Sq Epi: x / Non Sq Epi: x / Bacteria: x        77y/o  female with COPD , HTN and PVD recent stress test that was abnormal on 21 and presented  for cardiac work now undergoing up CABG eval as well as peripheral bypass surgery work up    Hyponatremia(mild now)- secondary to telmisartan/HCTZ use - now discontinued,  , TSH normal, cortisol low   Non oliguric ZAC -  renal fxn resolving  Ongoing CABG work up -likely being planned for tomorrow     RECOMMEND:  Fluid restriction uptil 1. 2 L   c/w  salt tablets 1 gm BID   Monitor Cr for now   No objection to CABG from renal standpoint         Lyric Michel MD  [facility]  Office: [phone]

## 2021-07-14 NOTE — PROGRESS NOTE ADULT - SUBJECTIVE AND OBJECTIVE BOX
Cardiac Surgery Pre-op Note:    CC: Patient is a 76y old  Female who presents with a chief complaint of sob (2021 05:50)    Referring Physician:                                                                                                              Surgeon: Dr. Guzman     Procedure: (7/15/21) (CABG)    Allergies    contrast media (gadolinium-based) (Rash; Short breath; Hives)  contrast media (iodine-based) (Rash; Short breath; Hives)    Intolerances      HPI:  This is a 75y/o  female with no known implantable devices noted COVID 19 negative with Pfizer Vaccine last dose 21 with PMHX of Current tobacco smoker on Nicotine patch , COPD , HTN and PVD. Pt had recent stress test that was abnormal on 21 see below  . Presents today for [facility] with Dr Quinton Whelan. Pt planned for Peripheral bypass surgery . No complaints of CP no sob no palpitations noted.     Dr. Guzman ,Jez Cardiologist   < from: Nuclear Stress Test-Pharmacologic (21 @ 10:25) >  NUCLEAR FINDINGS:  The left ventricle was small in size.  There are medium-sized, mild defects in the distal  anterior, anteroapical, distal anterolateral, and apical  lateral walls that are reversible suggestive of ischemia.  There are medium-sized, mild to moderate defects in the  anteroseptal andapical septal walls that are mostly fixed  suggestive of infarct with mild crystal-infarct ischemia.  There are medium-sized, mild defects in the inferior and  basal to mid inferoseptal walls that are mostly fixed  suggestive of infarct with minimal crystal-infarct ischemia.  ------------------------------------------------------------------------  GATED ANALYSIS:  Post-stress gated wall motion analysis was performed (LVEF  > 70%;LVEDV = 37 ml.) revealing hypokinesis of the basal  inferior and basal septal walls and reduced systolic  thickening of the mid to distal anteroseptal, mid to  distal inferior, distal anterior, and distal anterolateral  walls.  ------------------------------------------------------------------------  IMPRESSIONS:Abnormal Study  * Chest Pain: No chest pain with administration of  Regadenoson.  * Symptom: Shortness of breath, nausea, cramping.  * HR Response: Appropriate.  * BP Response: Appropriate.  * Heart Rhythm: Sinus Bradycardia - 57 BPM.    < end of copied text >  < from: Nuclear Stress Test-Pharmacologic (21 @ 10:25) >   Baseline ECG: Nonspecific ST-T wave abnormality.  * ECG Changes: No significant ischemic ST segment changes  beyond baseline abnormalities.  * Arrhythmia: Rare VPDs occurred during stress.  Frequent  VPDs occurred during recovery.  * The left ventricle was small in size.  *There are medium-sized, mild defects in the distal  anterior, anteroapical, distal anterolateral, and apical  lateral walls that are reversible suggestive of ischemia.  * There are medium-sized, mild to moderate defects in the  anteroseptal and apical septal walls that are mostly fixed  suggestive of infarct with mild crystal-infarct ischemia.  * There are medium-sized, mild defects in the inferior and  basal to mid inferoseptal walls that are mostly fixed  suggestive of infarct with minimal crystal-infarct ischemia.  * Post-stress gated wall motion analysis was performed  (LVEF > 70%;LVEDV = 37 ml.) revealing hypokinesis of the  basal inferior and basal septal walls and reduced systolic  thickening of the mid to distal anteroseptal, mid to  distalinferior, distal anterior, and distal anterolateral  walls.  *** No previous Nuclear/Stress exam.  ------------------------------------------------------------------------  Confirmed on  2021 - 14:16:08 by Bridger Garcia M.D.  ------------------------------------------------------------------------    < end of copied text >   (2021 12:18)      PAST MEDICAL & SURGICAL HISTORY:  HTN (hypertension)    Tobacco use    COPD (chronic obstructive pulmonary disease)    PVD (peripheral vascular disease)    No significant past surgical history    MEDICATIONS  (STANDING):  aspirin enteric coated 81 milliGRAM(s) Oral daily  buPROPion SR (12-Hour) 150 milliGRAM(s) Oral two times a day  cephalexin 250 milliGRAM(s) Oral daily  chlorhexidine 0.12% Liquid 30 milliLiter(s) Swish and Spit once  chlorhexidine 4% Liquid 1 Application(s) Topical once  cilostazol 100 milliGRAM(s) Oral two times a day  fluticasone furoate/umeclidinium/vilanterol 100-62.5-25 MICROgram(s) Inhaler 1 Puff(s) Inhalation daily  gabapentin 300 milliGRAM(s) Oral two times a day  heparin   Injectable 5000 Unit(s) SubCutaneous every 12 hours  nicotine - 21 mG/24Hr(s) Patch 1 patch Transdermal daily  pantoprazole    Tablet 40 milliGRAM(s) Oral before breakfast  polyethylene glycol 3350 17 Gram(s) Oral two times a day  senna 2 Tablet(s) Oral at bedtime  sodium chloride 1 Gram(s) Oral two times a day    MEDICATIONS  (PRN):  acetaminophen   Tablet .. 650 milliGRAM(s) Oral every 6 hours PRN Mild Pain (1 - 3), Moderate Pain (4 - 6)  ALBUTerol    90 MICROgram(s) HFA Inhaler 2 Puff(s) Inhalation every 6 hours PRN Shortness of Breath      Vital Signs Last 24 Hrs  T(C): 36.3 (21 @ 12:05), Max: 36.7 (21 @ 21:14)  T(F): 97.4 (21 @ 12:05), Max: 98 (21 @ 21:14)  HR: 72 (21 @ 12:05) (71 - 74)  BP: 107/69 (21 @ 12:05) (107/62 - 117/66)  RR: 18 (21 @ 12:05) (17 - 18)  SpO2: 94% (21 @ 12:05) (93% - 94%)          ABO Interpretation: O ( @ 05:46)     Daily     Daily Weight in k.3 (2021 08:24)  Admit Wt: Drug Dosing Weight  Height (cm): 157.5 (2021 17:52)  Weight (kg): 51.71 (2021 12:46)  BMI (kg/m2): 20.8 (2021 17:52)  BSA (m2): 1.51 (2021 17:52)    Labs:                        9.6    7.76  )-----------( 160      ( 2021 05:42 )             28.4         134<L>  |  103  |  19  ----------------------------<  96  3.9   |  19<L>  |  1.30    Ca    9.3      2021 05:42        P2Y12:     Blood Type: ABO Interpretation: O ( @ 05:46)    HGB A1C:   Prealbumin: Prealbumin, Serum: 17 mg/dL (07-10 @ 10:09)    Pro-BNP: Serum Pro-Brain Natriuretic Peptide: 376 pg/mL (07-10 @ 06:02)    Thyroid Panel:  @ 12:23/1.65  --/--/--    MRSA: MRSA PCR Result.: [place] (07-10 @ 08:15)   / MSSA:   Urinalysis Basic - ( 2021 18:35 )    Color: Light Yellow / Appearance: Clear / S.010 / pH: x  Gluc: x / Ketone: Negative  / Bili: Negative / Urobili: Negative   Blood: x / Protein: Negative / Nitrite: Negative   Leuk Esterase: Negative / RBC: x / WBC x   Sq Epi: x / Non Sq Epi: x / Bacteria: x      CXR:     EKG:    Carotid Duplex:      PFT's:    Echocardiogram:    Cardiac catheterization:    Vein Mapping:    Gen: WN/WD NAD  Neuro: AAOx3, nonfocal  Pulm: CTA B/L  CV: RRR, S1S2  Abd: Soft, NT, ND +BS  Ext: No edema, + peripheral pulses      Pt has AICD/PPM [ ] Yes  [ ] No             Brand Name:  Pre-op Beta Blocker ordered within 24 hrs of surgery (CABG ONLY)?  [ ] Yes  [ ] No  If not, Why?  Type & Cross  [ ] Yes  [ ] No  NPO after Midnight [ ] Yes  [ ] No  Pre-op ABX ordered, to be taped on chart:  [ ] Yes  [ ] No     Hibiclens/Peridex ordered [ ] Yes  [ ] No  Intraop on Hold: PRBCs, CXR, EMILY [ ]   Consent obtained  [ ] Yes  [ ] No   Cardiac Surgery Pre-op Note:    CC: Patient is a 76y old  Female who presents with a chief complaint of sob (2021 05:50)    Referring Physician:                                                                                                              Surgeon: Dr. Guzman     Procedure: (7/15/21) (CABG)    Allergies    contrast media (gadolinium-based) (Rash; Short breath; Hives)  contrast media (iodine-based) (Rash; Short breath; Hives)    Intolerances      HPI:  This is a 77y/o  female with no known implantable devices noted COVID 19 negative with Pfizer Vaccine last dose 21 with PMHX of Current tobacco smoker on Nicotine patch , COPD , HTN and PVD. Pt had recent stress test that was abnormal on 21 see below  . Presents today for [facility] with Dr Quinton Whelan. Pt planned for Peripheral bypass surgery . No complaints of CP no sob no palpitations noted.     Dr. Guzman ,Jez Cardiologist   < from: Nuclear Stress Test-Pharmacologic (21 @ 10:25) >  NUCLEAR FINDINGS:  The left ventricle was small in size.  There are medium-sized, mild defects in the distal  anterior, anteroapical, distal anterolateral, and apical  lateral walls that are reversible suggestive of ischemia.  There are medium-sized, mild to moderate defects in the  anteroseptal andapical septal walls that are mostly fixed  suggestive of infarct with mild crystal-infarct ischemia.  There are medium-sized, mild defects in the inferior and  basal to mid inferoseptal walls that are mostly fixed  suggestive of infarct with minimal crystal-infarct ischemia.  ------------------------------------------------------------------------  GATED ANALYSIS:  Post-stress gated wall motion analysis was performed (LVEF  > 70%;LVEDV = 37 ml.) revealing hypokinesis of the basal  inferior and basal septal walls and reduced systolic  thickening of the mid to distal anteroseptal, mid to  distal inferior, distal anterior, and distal anterolateral  walls.  ------------------------------------------------------------------------  IMPRESSIONS:Abnormal Study  * Chest Pain: No chest pain with administration of  Regadenoson.  * Symptom: Shortness of breath, nausea, cramping.  * HR Response: Appropriate.  * BP Response: Appropriate.  * Heart Rhythm: Sinus Bradycardia - 57 BPM.    < end of copied text >  < from: Nuclear Stress Test-Pharmacologic (21 @ 10:25) >   Baseline ECG: Nonspecific ST-T wave abnormality.  * ECG Changes: No significant ischemic ST segment changes  beyond baseline abnormalities.  * Arrhythmia: Rare VPDs occurred during stress.  Frequent  VPDs occurred during recovery.  * The left ventricle was small in size.  *There are medium-sized, mild defects in the distal  anterior, anteroapical, distal anterolateral, and apical  lateral walls that are reversible suggestive of ischemia.  * There are medium-sized, mild to moderate defects in the  anteroseptal and apical septal walls that are mostly fixed  suggestive of infarct with mild crystal-infarct ischemia.  * There are medium-sized, mild defects in the inferior and  basal to mid inferoseptal walls that are mostly fixed  suggestive of infarct with minimal crystal-infarct ischemia.  * Post-stress gated wall motion analysis was performed  (LVEF > 70%;LVEDV = 37 ml.) revealing hypokinesis of the  basal inferior and basal septal walls and reduced systolic  thickening of the mid to distal anteroseptal, mid to  distalinferior, distal anterior, and distal anterolateral  walls.  *** No previous Nuclear/Stress exam.  ------------------------------------------------------------------------  Confirmed on  2021 - 14:16:08 by Bridger Garcia M.D.  ------------------------------------------------------------------------    < end of copied text >   (2021 12:18)      PAST MEDICAL & SURGICAL HISTORY:  HTN (hypertension)    Tobacco use    COPD (chronic obstructive pulmonary disease)    PVD (peripheral vascular disease)    No significant past surgical history    MEDICATIONS  (STANDING):  aspirin enteric coated 81 milliGRAM(s) Oral daily  buPROPion SR (12-Hour) 150 milliGRAM(s) Oral two times a day  cephalexin 250 milliGRAM(s) Oral daily  chlorhexidine 0.12% Liquid 30 milliLiter(s) Swish and Spit once  chlorhexidine 4% Liquid 1 Application(s) Topical once  cilostazol 100 milliGRAM(s) Oral two times a day  fluticasone furoate/umeclidinium/vilanterol 100-62.5-25 MICROgram(s) Inhaler 1 Puff(s) Inhalation daily  gabapentin 300 milliGRAM(s) Oral two times a day  heparin Injectable 5000 Unit(s) SubCutaneous every 12 hours  nicotine - 21 mG/24Hr(s) Patch 1 patch Transdermal daily  pantoprazole  Tablet 40 milliGRAM(s) Oral before breakfast  polyethylene glycol 3350 17 Gram(s) Oral two times a day  senna 2 Tablet(s) Oral at bedtime  sodium chloride 1 Gram(s) Oral two times a day    MEDICATIONS  (PRN):  acetaminophen Tablet .. 650 milliGRAM(s) Oral every 6 hours PRN Mild Pain (1 - 3), Moderate Pain (4 - 6)  ALBUTerol  90 MICROgram(s) HFA Inhaler 2 Puff(s) Inhalation every 6 hours PRN Shortness of Breath      Vital Signs Last 24 Hrs  T(C): 36.3 (21 @ 12:05), Max: 36.7 (21 @ 21:14)  T(F): 97.4 (21 @ 12:05), Max: 98 (21 @ 21:14)  HR: 72 (21 @ 12:05) (71 - 74)  BP: 107/69 (21 @ 12:05) (107/62 - 117/66)  RR: 18 (21 @ 12:05) (17 - 18)  SpO2: 94% (21 @ 12:05) (93% - 94%)          ABO Interpretation: O ( @ 05:46)     Daily     Daily Weight in k.3 (2021 08:24)  Admit Wt: Drug Dosing Weight  Height (cm): 157.5 (2021 17:52)  Weight (kg): 51.71 (2021 12:46)  BMI (kg/m2): 20.8 (2021 17:52)  BSA (m2): 1.51 (2021 17:52)    Labs:                        9.6    7.76  )-----------( 160      ( 2021 05:42 )             28.4         134<L>  |  103  |  19  ----------------------------<  96  3.9   |  19<L>  |  1.30    Ca    9.3      2021 05:42    P2Y12: 263    Blood Type: ABO Interpretation: O ( @ 05:46)    HGB A1C:   Prealbumin: Prealbumin, Serum: 17 mg/dL (07-10 @ 10:09)    Pro-BNP: Serum Pro-Brain Natriuretic Peptide: 376 pg/mL (07-10 @ 06:02)    Thyroid Panel:  @ 12:23/1.65  --/--/--    MRSA: MRSA PCR Result.: NotDetec (07-10 @ 08:15)   / MSSA:   Urinalysis Basic - ( 2021 18:35 )    Color: Light Yellow / Appearance: Clear / S.010 / pH: x  Gluc: x / Ketone: Negative  / Bili: Negative / Urobili: Negative   Blood: x / Protein: Negative / Nitrite: Negative   Leuk Esterase: Negative / RBC: x / WBC x   Sq Epi: x / Non Sq Epi: x / Bacteria: x      CXR: < from: Xray Chest 1 View AP/PA (21 @ 18:48) Normal heart size. Atherosclerotic aorta. No consolidation or effusion.    IMPRESSION: No active disease    EKG: < from: 12 Lead ECG (07.10.21 @ 09:11) >    Ventricular Rate 80 BPM    Atrial Rate 80 BPM    P-R Interval 154 ms    QRS Duration 76 ms    Q-T Interval 402 ms    QTC Calculation(Bazett) 463 ms    P Axis 70 degrees    R Axis -19 degrees    T Axis 81 degrees    Diagnosis Line NORMAL SINUS RHYTHM  LOW VOLTAGE QRS  BORDERLINE ECG    VA Duplex Upper Extrem Arterial, Bilat (21 @ 10:14) On compression of the right and left radial arteries there isenhanced flow through the right and left ulnar arteries. IMPRESSION: There is unimpeded flow through the right and left brachial, radial and ulnar arteries. There is a normal response bilaterally to the Silvano test.    Echocardiogram: < from: Transthoracic Echocardiogram (21 @ 08:00) >  Observations:  Mitral Valve: Mitral annular calcification, otherwise  normal mitral valve. Mild mitral regurgitation.  Aortic Valve/Aorta: Calcified trileaflet aortic valve with  normal opening. Peak transaortic valve gradient equals 11  mm Hg, mean transaortic valve gradient equals 5 mm Hg,  aortic valve velocity time integral equals 29 cm. Peak left  ventricular outflow tract gradient equals 6 mm Hg, mean  gradient is equal to 3 mm Hg, LVOT velocity time integral  equals 20 cm.  Aortic Root: 2.9 cm.  Left Atrium: Normal left atrium.  LA volume index = 19  cc/m2.  Left Ventricle: Hyperdynamic left ventricular systolic  function. Normal left ventricular internal dimensions and  wall thicknesses. Normal diastolic function  Right Heart: Normal right atrium. Normal right ventricular  size and function. Normal tricuspid valve. Normal pulmonic  valve.  Pericardium/Pleura: Thickened pericardium. Pericardial fat  pad noted. Small pericardial effusion posterior to  the LV  and RV.  There is 1.1cm organized collection anterior to  the RV. No tamponade seen.  Hemodynamic: Estimated right atrial pressure is 8 mm Hg.  ------------------------------------------------------------------------  Conclusions:  1. Calcified trileaflet aortic valve with normal opening.  2. Hyperdynamic left ventricular systolic function.  3. Normal right ventricular size and function.  4. Thickened pericardium. Pericardial fat pad noted. Small  pericardial effusion posterior to  the LV and RV.  There is  1.1cm organized collection anterior to the RV. No tamponade  seen.    Cardiac catheterization: < from: Cardiac Cath Lab - Adult (21 @ 14:33) >  LM:   --  LM: Normal.  LAD:   --  Proximal LAD: There was a 70 % stenosis.  CX:   --  Proximal circumflex: There was a 90 % stenosis.  RCA:   --  Proximal RCA: There was a diffuse 90 % stenosis.  --  Mid RCA: There was a diffuse 90 % stenosis.  COMPLICATIONS: There were no complications.  DIAGNOSTIC IMPRESSIONS: Severe PAD. Unable to access R femoral (ultrasound  used to assess fesability and vessel too small and too diseased). L  femoral accessed with micropuncture and with difficulty 5FR sheath placed  in R CFA. Severe 3 V CAD.    Gen: WN/WD NAD  Neuro: AAOx3, nonfocal  Pulm: CTA B/L  CV: RRR, S1S2  Abd: Soft, NT, ND +BS  Ext: No edema, + peripheral pulses      Pt has AICD/PPM [ ] Yes  [X ] No             Brand Name:  Pre-op Beta Blocker ordered within 24 hrs of surgery (CABG ONLY)?  [X] Yes  [ ] No  If not, Why?  Type & Cross  [X] Yes  [ ] No  NPO after Midnight [X ] Yes  [ ] No  Pre-op ABX ordered, to be taped on chart:  [X ] Yes  [ ] No     Hibiclens/Peridex ordered [X ] Yes  [ ] No  Intraop on Hold: EMILY [X ]   Consent obtained  [X ] Yes  [ ] No   Cardiac Surgery Pre-op Note:    CC: Patient is a 76y old  Female who presents with a chief complaint of sob (2021 05:50)    Referring Physician:  Dr. Guzman                                                                                                             Surgeon: Dr. Isai Cole     Procedure: (7/15/21) (CABG)    Allergies    contrast media (gadolinium-based) (Rash; Short breath; Hives)  contrast media (iodine-based) (Rash; Short breath; Hives)    Intolerances    HPI:  This is a 75y/o  female with no known implantable devices noted COVID 19 negative with Pfizer Vaccine last dose 21 with PMHX of Current tobacco smoker on Nicotine patch , COPD , HTN and PVD. Pt had recent stress test that was abnormal on 21 see below  . Presents today for [facility] with Dr Quinton Whelan. Pt planned for Peripheral bypass surgery . No complaints of CP no sob no palpitations noted.     Dr. Guzman ,Jez Cardiologist   < from: Nuclear Stress Test-Pharmacologic (21 @ 10:25) >  NUCLEAR FINDINGS:  The left ventricle was small in size.  There are medium-sized, mild defects in the distal  anterior, anteroapical, distal anterolateral, and apical  lateral walls that are reversible suggestive of ischemia.  There are medium-sized, mild to moderate defects in the  anteroseptal and apical septal walls that are mostly fixed  suggestive of infarct with mild crystal-infarct ischemia.  There are medium-sized, mild defects in the inferior and  basal to mid inferoseptal walls that are mostly fixed  suggestive of infarct with minimal crystal-infarct ischemia.  ------------------------------------------------------------------------  GATED ANALYSIS:  Post-stress gated wall motion analysis was performed (LVEF  > 70%;LVEDV = 37 ml.) revealing hypokinesis of the basal  inferior and basal septal walls and reduced systolic  thickening of the mid to distal anteroseptal, mid to  distal inferior, distal anterior, and distal anterolateral  walls.  ------------------------------------------------------------------------  IMPRESSIONS: Abnormal Study  * Chest Pain: No chest pain with administration of  Regadenoson.  * Symptom: Shortness of breath, nausea, cramping.  * HR Response: Appropriate.  * BP Response: Appropriate.  * Heart Rhythm: Sinus Bradycardia - 57 BPM.    < end of copied text >  < from: Nuclear Stress Test-Pharmacologic (21 @ 10:25) >   Baseline ECG: Nonspecific ST-T wave abnormality.  * ECG Changes: No significant ischemic ST segment changes  beyond baseline abnormalities.  * Arrhythmia: Rare VPDs occurred during stress.  Frequent  VPDs occurred during recovery.  * The left ventricle was small in size.  *There are medium-sized, mild defects in the distal  anterior, anteroapical, distal anterolateral, and apical  lateral walls that are reversible suggestive of ischemia.  * There are medium-sized, mild to moderate defects in the  anteroseptal and apical septal walls that are mostly fixed  suggestive of infarct with mild crystal-infarct ischemia.  * There are medium-sized, mild defects in the inferior and  basal to mid inferoseptal walls that are mostly fixed  suggestive of infarct with minimal crystal-infarct ischemia.  * Post-stress gated wall motion analysis was performed  (LVEF > 70%;LVEDV = 37 ml.) revealing hypokinesis of the  basal inferior and basal septal walls and reduced systolic  thickening of the mid to distal anteroseptal, mid to  distalinferior, distal anterior, and distal anterolateral  walls.  *** No previous Nuclear/Stress exam.  ------------------------------------------------------------------------  Confirmed on  2021 - 14:16:08 by Bridger Garcia M.D.  ------------------------------------------------------------------------    < end of copied text >   (2021 12:18)      PAST MEDICAL & SURGICAL HISTORY:  HTN (hypertension)    Tobacco use    COPD (chronic obstructive pulmonary disease)    PVD (peripheral vascular disease)    No significant past surgical history    MEDICATIONS  (STANDING):  aspirin enteric coated 81 milliGRAM(s) Oral daily  buPROPion SR (12-Hour) 150 milliGRAM(s) Oral two times a day  cephalexin 250 milliGRAM(s) Oral daily  chlorhexidine 0.12% Liquid 30 milliLiter(s) Swish and Spit once  chlorhexidine 4% Liquid 1 Application(s) Topical once  cilostazol 100 milliGRAM(s) Oral two times a day  fluticasone furoate/umeclidinium/vilanterol 100-62.5-25 MICROgram(s) Inhaler 1 Puff(s) Inhalation daily  gabapentin 300 milliGRAM(s) Oral two times a day  heparin Injectable 5000 Unit(s) SubCutaneous every 12 hours  nicotine - 21 mG/24Hr(s) Patch 1 patch Transdermal daily  pantoprazole  Tablet 40 milliGRAM(s) Oral before breakfast  polyethylene glycol 3350 17 Gram(s) Oral two times a day  senna 2 Tablet(s) Oral at bedtime  sodium chloride 1 Gram(s) Oral two times a day    MEDICATIONS  (PRN):  acetaminophen Tablet .. 650 milliGRAM(s) Oral every 6 hours PRN Mild Pain (1 - 3), Moderate Pain (4 - 6)  ALBUTerol  90 MICROgram(s) HFA Inhaler 2 Puff(s) Inhalation every 6 hours PRN Shortness of Breath      Vital Signs Last 24 Hrs  T(C): 36.3 (21 @ 12:05), Max: 36.7 (21 @ 21:14)  T(F): 97.4 (21 @ 12:05), Max: 98 (21 @ 21:14)  HR: 72 (21 @ 12:05) (71 - 74)  BP: 107/69 (21 @ 12:05) (107/62 - 117/66)  RR: 18 (21 @ 12:05) (17 - 18)  SpO2: 94% (21 @ 12:05) (93% - 94%)          ABO Interpretation: O ( @ 05:46)     Daily     Daily Weight in k.3 (2021 08:24)  Admit Wt: Drug Dosing Weight  Height (cm): 157.5 (2021 17:52)  Weight (kg): 51.71 (2021 12:46)  BMI (kg/m2): 20.8 (2021 17:52)  BSA (m2): 1.51 (2021 17:52)    Labs:                        9.6    7.76  )-----------( 160      ( 2021 05:42 )             28.4         134<L>  |  103  |  19  ----------------------------<  96  3.9   |  19<L>  |  1.30    Ca    9.3      2021 05:42    P2Y12: 263    Blood Type: ABO Interpretation: O ( @ 05:46)    HGB A1C:   Prealbumin: Prealbumin, Serum: 17 mg/dL (07-10 @ 10:09)    Pro-BNP: Serum Pro-Brain Natriuretic Peptide: 376 pg/mL (07-10 @ 06:02)    Thyroid Panel:  @ 12:23/1.65  --/--/--    MRSA: MRSA PCR Result.: NotDetec (07-10 @ 08:15)   / MSSA:   Urinalysis Basic - ( 2021 18:35 )    Color: Light Yellow / Appearance: Clear / S.010 / pH: x  Gluc: x / Ketone: Negative  / Bili: Negative / Urobili: Negative   Blood: x / Protein: Negative / Nitrite: Negative   Leuk Esterase: Negative / RBC: x / WBC x   Sq Epi: x / Non Sq Epi: x / Bacteria: x      CXR: < from: Xray Chest 1 View AP/PA (21 @ 18:48) Normal heart size. Atherosclerotic aorta. No consolidation or effusion.    IMPRESSION: No active disease    EKG: < from: 12 Lead ECG (07.10.21 @ 09:11) >    Ventricular Rate 80 BPM    Atrial Rate 80 BPM    P-R Interval 154 ms    QRS Duration 76 ms    Q-T Interval 402 ms    QTC Calculation(Bazett) 463 ms    P Axis 70 degrees    R Axis -19 degrees    T Axis 81 degrees    Diagnosis Line NORMAL SINUS RHYTHM  LOW VOLTAGE QRS  BORDERLINE ECG    VA Duplex Upper Extrem Arterial, Bilat (21 @ 10:14) On compression of the right and left radial arteries there isenhanced flow through the right and left ulnar arteries. IMPRESSION: There is unimpeded flow through the right and left brachial, radial and ulnar arteries. There is a normal response bilaterally to the Silvano test.    Echocardiogram: < from: Transthoracic Echocardiogram (21 @ 08:00) >  Observations:  Mitral Valve: Mitral annular calcification, otherwise  normal mitral valve. Mild mitral regurgitation.  Aortic Valve/Aorta: Calcified trileaflet aortic valve with  normal opening. Peak transaortic valve gradient equals 11  mm Hg, mean transaortic valve gradient equals 5 mm Hg,  aortic valve velocity time integral equals 29 cm. Peak left  ventricular outflow tract gradient equals 6 mm Hg, mean  gradient is equal to 3 mm Hg, LVOT velocity time integral  equals 20 cm.  Aortic Root: 2.9 cm.  Left Atrium: Normal left atrium.  LA volume index = 19  cc/m2.  Left Ventricle: Hyperdynamic left ventricular systolic  function. Normal left ventricular internal dimensions and  wall thicknesses. Normal diastolic function  Right Heart: Normal right atrium. Normal right ventricular  size and function. Normal tricuspid valve. Normal pulmonic  valve.  Pericardium/Pleura: Thickened pericardium. Pericardial fat  pad noted. Small pericardial effusion posterior to  the LV  and RV.  There is 1.1cm organized collection anterior to  the RV. No tamponade seen.  Hemodynamic: Estimated right atrial pressure is 8 mm Hg.  ------------------------------------------------------------------------  Conclusions:  1. Calcified trileaflet aortic valve with normal opening.  2. Hyperdynamic left ventricular systolic function.  3. Normal right ventricular size and function.  4. Thickened pericardium. Pericardial fat pad noted. Small  pericardial effusion posterior to  the LV and RV.  There is  1.1cm organized collection anterior to the RV. No tamponade  seen.    Cardiac catheterization: < from: Cardiac Cath Lab - Adult (21 @ 14:33) >  LM:   --  LM: Normal.  LAD:   --  Proximal LAD: There was a 70 % stenosis.  CX:   --  Proximal circumflex: There was a 90 % stenosis.  RCA:   --  Proximal RCA: There was a diffuse 90 % stenosis.  --  Mid RCA: There was a diffuse 90 % stenosis.  COMPLICATIONS: There were no complications.  DIAGNOSTIC IMPRESSIONS: Severe PAD. Unable to access R femoral (ultrasound  used to assess fesability and vessel too small and too diseased). L  femoral accessed with micropuncture and with difficulty 5FR sheath placed  in R CFA. Severe 3 V CAD.    Gen: WN/WD NAD  Neuro: AAOx3, nonfocal  Pulm: CTA B/L  CV: RRR, S1S2  Abd: Soft, NT, ND +BS  Ext: No edema, + peripheral pulses      Pt has AICD/PPM [ ] Yes  [X ] No             Brand Name:  Pre-op Beta Blocker ordered within 24 hrs of surgery (CABG ONLY)?  [X] Yes  [ ] No  If not, Why?  Type & Cross  [X] Yes  [ ] No  NPO after Midnight [X ] Yes  [ ] No  Pre-op ABX ordered, to be taped on chart:  [X ] Yes  [ ] No     Hibiclens/Peridex ordered [X ] Yes  [ ] No  Intraop on Hold: EMILY [X ]   Consent obtained  [X ] Yes  [ ] No

## 2021-07-14 NOTE — PROGRESS NOTE ADULT - ASSESSMENT
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 Pulmonology consulted for possible COPD. Pt now on 2L O2 but may not be needed, not currently in exacerbation.     Problems:  COPD        Recommendation  - Continue trelegy 1 puff daily which pt has at bedside, please have RN bring down to pharmacy so can document in EMR that pt is actually taking  - Albuterol nebulizer q6h PRN  - Maintain O2 saturation > 88%, if she does not desaturate with ambulation then no need for O2/ acapella/incentive spirometry  *****************  7/13-mucusy in am--surgery 7/15                           (see below)   F current smoker (>50 py history), treated as COPD but PFT showing air trapping and no obstruction present, HTN, PVD presented for abnormal stress test and 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 Pulmonology consulted for possible COPD. Pt now on 2L O2 but may not be needed, not currently in exacerbation.     Problems:  COPD        Recommendation  - Continue trelegy 1 puff daily which pt has at bedside, please have RN bring down to pharmacy so can document in EMR that pt is actually taking  - Albuterol nebulizer q6h PRN  - Maintain O2 saturation > 88%, if she does not desaturate with ambulation then no need for O2/ acapella/incentive spirometry  *****************  7/13-mucusy in am--surgery 7/15                           (see below)  7/14-better today

## 2021-07-14 NOTE — PROGRESS NOTE ADULT - PROBLEM SELECTOR PLAN 1
Pre op Cardiac surgery work up in progress   Continue with ASA 81 mg PO daily   Continue with Lopressor   Continue with Statin   NPO after midnight   Hibiclens Shower tonight at 8 pm   Plan for Cardiac Surgery in AM with Dr. Cole Pre op Cardiac surgery work up in progress   Continue with ASA 81 mg PO daily   Start Lopressor 12.5 mg PO BID   Start Lipitor 20 mg PO HS   NPO after midnight   Hibiclens Shower tonight at 8 pm   Plan for Cardiac Surgery in AM with Dr. Cole

## 2021-07-14 NOTE — PROGRESS NOTE ADULT - ATTENDING COMMENTS
as above:  Multifactorial dyspnea--COPD-CB/emphysema (pt was seen by Dr. Montelongo in the office on 7/8   PFT showed a normal FEV1, TLC, DLCO of 68), CAD, debility--keep sat above 90%  COPD/CB/emphysejma--trelegy 1 q day, incentive spirometry, acapella, albuterol q 6  nicotine addiction-- smoking cessation advised.  lung CA screening---LDCT was to be done, will need f/up ct in 6 months  CAD for OHS 7/15         preop--no absolute pulm contras to OHS at this time though pt at risk for PNA/bronchitis etc.  DVT and GI prophylaxis     renal f/up    Isrrael Klein MD-Pulmonary   [phone] as above: better-ready for surgery  Multifactorial dyspnea--COPD-CB/emphysema (pt was seen by Dr. Montelongo in the office on 7/8   PFT showed a normal FEV1, TLC, DLCO of 68), CAD, debility--keep sat above 90%  COPD/CB/emphysejma--trelegy 1 q day, incentive spirometry, acapella, albuterol q 6  nicotine addiction-- smoking cessation advised.  lung CA screening---LDCT was to be done, will need f/up ct in 6 months  CAD for O[facility] 7/15         preop--no absolute pulm contras to [facility] at this time though pt at risk for PNA/bronchitis etc.  DVT and GI prophylaxis     renal f/up    Isrrael Klein MD-Pulmonary   [phone]

## 2021-07-14 NOTE — PROGRESS NOTE ADULT - ASSESSMENT
76 year old female current everyday smoker on nicotine patch Hx COPD, PVD, CAD presents to [facility] for LHC, as preop testing prior to PAD procedure. S/p LHC revealing TVD, CT surgery consulted for CABG eval.     7/9/21 CT Surgery evaluation in progress. Preoperative work up in progress. Dr. Cole to review angiogram findings.   7/14 VVS; NPO after midnight for Cardiac surgery with Dr. Cole.

## 2021-07-14 NOTE — PROGRESS NOTE ADULT - SUBJECTIVE AND OBJECTIVE BOX
CHIEF COMPLAINT: f/up sob, preop resp, copd--mucusy in am, not walking due to bp issues    Interval Events: nephro/cards f/up      Interval Events:    REVIEW OF SYSTEMS:  Constitutional: No fevers or chills. No weight loss. No fatigue or generalized malaise.  Eyes: No itching or discharge from the eyes  ENT: No ear pain. No ear discharge. No nasal congestion. No post nasal drip. No epistaxis. No throat pain. No sore throat. No difficulty swallowing.   CV: No chest pain. No palpitations. No lightheadedness or dizziness.   Resp: No dyspnea at rest. No dyspnea on exertion. No orthopnea. No wheezing. No cough. No stridor. No sputum production. No chest pain with respiration.  GI: No nausea. No vomiting. No diarrhea.  MSK: No joint pain or pain in any extremities  Integumentary: No skin lesions. No pedal edema.  Neurological: No gross motor weakness. No sensory changes.  [ ] All other systems negative  [ ] Unable to assess ROS because ________    OBJECTIVE:  ICU Vital Signs Last 24 Hrs  T(C): 36.6 (2021 04:53), Max: 36.7 (2021 21:14)  T(F): 97.9 (2021 04:53), Max: 98 (2021 21:14)  HR: 71 (2021 04:53) (71 - 74)  BP: 107/62 (2021 04:53) (107/62 - 117/66)  BP(mean): --  ABP: --  ABP(mean): --  RR: 17 (2021 04:53) (16 - 17)  SpO2: 93% (2021 04:53) (93% - 95%)         @ 07:  -   @ 07:00  --------------------------------------------------------  IN: 120 mL / OUT: 1400 mL / NET: -1280 mL     @ 07:  -   @ 05:50  --------------------------------------------------------  IN: 540 mL / OUT: 250 mL / NET: 290 mL      CAPILLARY BLOOD GLUCOSE          PHYSICAL EXAM:  General: Awake, alert, oriented X 3.   HEENT: Atraumatic, normocephalic.                 Mallampatti Grade                 No nasal congestion.                No tonsillar or pharyngeal exudates.  Lymph Nodes: No palpable lymphadenopathy  Neck: No JVD. No carotid bruit.   Respiratory: Normal chest expansion                         Normal percussion                         Normal and equal air entry                         No wheeze, rhonchi or rales.  Cardiovascular: S1 S2 normal. No murmurs, rubs or gallops.   Abdomen: Soft, non-tender, non-distended. No organomegaly. Normoactive bowel sounds.  Extremities: Warm to touch. Peripheral pulse palpable. No pedal edema.   Skin: No rashes or skin lesions  Neurological: Motor and sensory examination equal and normal in all four extremities.  Psychiatry: Appropriate mood and affect.    HOSPITAL MEDICATIONS:  MEDICATIONS  (STANDING):  aspirin enteric coated 81 milliGRAM(s) Oral daily  buPROPion SR (12-Hour) 150 milliGRAM(s) Oral two times a day  cephalexin 250 milliGRAM(s) Oral daily  cilostazol 100 milliGRAM(s) Oral two times a day  fluticasone furoate/umeclidinium/vilanterol 100-62.5-25 MICROgram(s) Inhaler 1 Puff(s) Inhalation daily  gabapentin 300 milliGRAM(s) Oral two times a day  heparin   Injectable 5000 Unit(s) SubCutaneous every 12 hours  nicotine - 21 mG/24Hr(s) Patch 1 patch Transdermal daily  pantoprazole    Tablet 40 milliGRAM(s) Oral before breakfast  polyethylene glycol 3350 17 Gram(s) Oral two times a day  senna 2 Tablet(s) Oral at bedtime  sodium chloride 1 Gram(s) Oral two times a day    MEDICATIONS  (PRN):  acetaminophen   Tablet .. 650 milliGRAM(s) Oral every 6 hours PRN Mild Pain (1 - 3), Moderate Pain (4 - 6)  ALBUTerol    90 MICROgram(s) HFA Inhaler 2 Puff(s) Inhalation every 6 hours PRN Shortness of Breath      LABS:                        9.6    7.76  )-----------( 160      ( 2021 05:42 )             28.4     07-13    134<L>  |  103  |  19  ----------------------------<  96  3.9   |  19<L>  |  1.30    Ca    9.3      2021 05:42        Urinalysis Basic - ( 2021 18:35 )    Color: Light Yellow / Appearance: Clear / S.010 / pH: x  Gluc: x / Ketone: Negative  / Bili: Negative / Urobili: Negative   Blood: x / Protein: Negative / Nitrite: Negative   Leuk Esterase: Negative / RBC: x / WBC x   Sq Epi: x / Non Sq Epi: x / Bacteria: x            MICROBIOLOGY:     RADIOLOGY:  [ ] Reviewed and interpreted by me    Point of Care Ultrasound Findings:    PFT:    EKG: CHIEF COMPLAINT: f/up sob, preop resp, copd--better over all-more ambulatory    Interval Events: nephro/cards f/up      REVIEW OF SYSTEMS:  Constitutional: No fevers or chills. No weight loss. No fatigue or generalized malaise.  Eyes: No itching or discharge from the eyes  ENT: No ear pain. No ear discharge. No nasal congestion. No post nasal drip. No epistaxis. No throat pain. No sore throat. No difficulty swallowing.   CV: No chest pain. No palpitations. No lightheadedness or dizziness.   Resp: No dyspnea at rest. + dyspnea on exertion. No orthopnea. No wheezing. No cough. No stridor. No sputum production. No chest pain with respiration.  GI: No nausea. No vomiting. No diarrhea.  MSK: No joint pain or pain in any extremities  Integumentary: No skin lesions. No pedal edema.  Neurological: No gross motor weakness. No sensory changes.  [+ ] All other systems negative  [ ] Unable to assess ROS because ________    OBJECTIVE:  ICU Vital Signs Last 24 Hrs  T(C): 36.6 (2021 04:53), Max: 36.7 (2021 21:14)  T(F): 97.9 (2021 04:53), Max: 98 (2021 21:14)  HR: 71 (2021 04:53) (71 - 74)  BP: 107/62 (2021 04:53) (107/62 - 117/66)  BP(mean): --  ABP: --  ABP(mean): --  RR: 17 (2021 04:53) (16 - 17)  SpO2: 93% (2021 04:53) (93% - 95%)         @ 07:  -   @ 07:00  --------------------------------------------------------  IN: 120 mL / OUT: 1400 mL / NET: -1280 mL     @ 07:  -   @ 05:50  --------------------------------------------------------  IN: 540 mL / OUT: 250 mL / NET: 290 mL      CAPILLARY BLOOD GLUCOSE          PHYSICAL EXAM: NAD in bed  General: Awake, alert, oriented X 3.   HEENT: Atraumatic, normocephalic.                 Mallampatti Grade 3                No nasal congestion.                No tonsillar or pharyngeal exudates.  Lymph Nodes: No palpable lymphadenopathy  Neck: No JVD. No carotid bruit.   Respiratory: Normal chest expansion                         Normal percussion                         Normal and equal air entry                         + exp wheeze, no rhonchi or rales.  Cardiovascular: S1 S2 normal. No murmurs, rubs or gallops.   Abdomen: Soft, non-tender, non-distended. No organomegaly. Normoactive bowel sounds.  Extremities: Warm to touch. Peripheral pulse palpable. No pedal edema.   Skin: No rashes or skin lesions  Neurological: Motor and sensory examination equal and normal in all four extremities.  Psychiatry: Appropriate mood and affect.    HOSPITAL MEDICATIONS:  MEDICATIONS  (STANDING):  aspirin enteric coated 81 milliGRAM(s) Oral daily  buPROPion SR (12-Hour) 150 milliGRAM(s) Oral two times a day  cephalexin 250 milliGRAM(s) Oral daily  cilostazol 100 milliGRAM(s) Oral two times a day  fluticasone furoate/umeclidinium/vilanterol 100-62.5-25 MICROgram(s) Inhaler 1 Puff(s) Inhalation daily  gabapentin 300 milliGRAM(s) Oral two times a day  heparin   Injectable 5000 Unit(s) SubCutaneous every 12 hours  nicotine - 21 mG/24Hr(s) Patch 1 patch Transdermal daily  pantoprazole    Tablet 40 milliGRAM(s) Oral before breakfast  polyethylene glycol 3350 17 Gram(s) Oral two times a day  senna 2 Tablet(s) Oral at bedtime  sodium chloride 1 Gram(s) Oral two times a day    MEDICATIONS  (PRN):  acetaminophen   Tablet .. 650 milliGRAM(s) Oral every 6 hours PRN Mild Pain (1 - 3), Moderate Pain (4 - 6)  ALBUTerol    90 MICROgram(s) HFA Inhaler 2 Puff(s) Inhalation every 6 hours PRN Shortness of Breath      LABS:                        9.6    7.76  )-----------( 160      ( 2021 05:42 )             28.4     07-13    134<L>  |  103  |  19  ----------------------------<  96  3.9   |  19<L>  |  1.30    Ca    9.3      2021 05:42        Urinalysis Basic - ( 2021 18:35 )    Color: Light Yellow / Appearance: Clear / S.010 / pH: x  Gluc: x / Ketone: Negative  / Bili: Negative / Urobili: Negative   Blood: x / Protein: Negative / Nitrite: Negative   Leuk Esterase: Negative / RBC: x / WBC x   Sq Epi: x / Non Sq Epi: x / Bacteria: x            MICROBIOLOGY:     RADIOLOGY:  [ ] Reviewed and interpreted by me    Point of Care Ultrasound Findings:    PFT:    EKG:

## 2021-07-14 NOTE — PROGRESS NOTE ADULT - SUBJECTIVE AND OBJECTIVE BOX
DATE OF SERVICE: 07-14-21 @ 12:49  CHIEF COMPLAINT:Patient is a 76y old  Female who presents with a chief complaint of sob (14 Jul 2021 05:50)    	        PAST MEDICAL & SURGICAL HISTORY:  HTN (hypertension)    Tobacco use    COPD (chronic obstructive pulmonary disease)    PVD (peripheral vascular disease)    No significant past surgical history              RESPIRATORY: No cough, wheezing, chills or hemoptysis; No Shortness of Breath  CARDIOVASCULAR: No chest pain, palpitations, passing out, dizziness, or leg swelling  GASTROINTESTINAL: No abdominal or epigastric pain. No nausea, vomiting, or hematemesis; No diarrhea or constipation. No melena or hematochezia.  GENITOURINARY: No dysuria, frequency, hematuria, or incontinence  NEUROLOGICAL: No headaches, memory loss, loss of strength, numbness, or tremors    MUSCULOSKELETAL: No joint pain or swelling; No muscle, back, or extremity pain    Medications:  MEDICATIONS  (STANDING):  aspirin enteric coated 81 milliGRAM(s) Oral daily  buPROPion SR (12-Hour) 150 milliGRAM(s) Oral two times a day  cephalexin 250 milliGRAM(s) Oral daily  cilostazol 100 milliGRAM(s) Oral two times a day  fluticasone furoate/umeclidinium/vilanterol 100-62.5-25 MICROgram(s) Inhaler 1 Puff(s) Inhalation daily  gabapentin 300 milliGRAM(s) Oral two times a day  heparin   Injectable 5000 Unit(s) SubCutaneous every 12 hours  nicotine - 21 mG/24Hr(s) Patch 1 patch Transdermal daily  pantoprazole    Tablet 40 milliGRAM(s) Oral before breakfast  polyethylene glycol 3350 17 Gram(s) Oral two times a day  senna 2 Tablet(s) Oral at bedtime  sodium chloride 1 Gram(s) Oral two times a day    MEDICATIONS  (PRN):  acetaminophen   Tablet .. 650 milliGRAM(s) Oral every 6 hours PRN Mild Pain (1 - 3), Moderate Pain (4 - 6)  ALBUTerol    90 MICROgram(s) HFA Inhaler 2 Puff(s) Inhalation every 6 hours PRN Shortness of Breath    	    PHYSICAL EXAM:  T(C): 36.3 (07-14-21 @ 12:05), Max: 36.7 (07-13-21 @ 21:14)  HR: 72 (07-14-21 @ 12:05) (71 - 74)  BP: 107/69 (07-14-21 @ 12:05) (107/62 - 117/66)  RR: 18 (07-14-21 @ 12:05) (17 - 18)  SpO2: 94% (07-14-21 @ 12:05) (93% - 94%)  Wt(kg): --  I&O's Summary    13 Jul 2021 07:01  -  14 Jul 2021 07:00  --------------------------------------------------------  IN: 540 mL / OUT: 700 mL / NET: -160 mL    14 Jul 2021 07:01  -  14 Jul 2021 12:49  --------------------------------------------------------  IN: 297 mL / OUT: 0 mL / NET: 297 mL        Appearance: Normal	  HEENT:   Normal oral mucosa, PERRL, EOMI	  Lymphatic: No lymphadenopathy  Cardiovascular: Normal S1 S2, No JVD, No murmurs, No edema  Respiratory: dec bs   Gastrointestinal:  Soft, Non-tender, + BS	  Skin: No rashes, No ecchymoses, No cyanosis	  Neurologic: Non-focal  Extremities: Normal range of motion, No clubbing, cyanosis or edema  Vascular: Peripheral pulses palpable 2+ bilaterally    TELEMETRY: 	    ECG:  	  RADIOLOGY:  OTHER: 	  	  LABS:	 	    CARDIAC MARKERS:                                9.6    7.76  )-----------( 160      ( 13 Jul 2021 05:42 )             28.4     07-13    134<L>  |  103  |  19  ----------------------------<  96  3.9   |  19<L>  |  1.30    Ca    9.3      13 Jul 2021 05:42      proBNP:   Lipid Profile:   HgA1c:   TSH:

## 2021-07-15 ENCOUNTER — APPOINTMENT (OUTPATIENT)
Dept: CARDIOTHORACIC SURGERY | Facility: HOSPITAL | Age: 76
End: 2021-07-15

## 2021-07-15 LAB
ALBUMIN SERPL ELPH-MCNC: 3.3 G/DL — SIGNIFICANT CHANGE UP (ref 3.3–5)
ALP SERPL-CCNC: 43 U/L — SIGNIFICANT CHANGE UP (ref 40–120)
ALT FLD-CCNC: 12 U/L — SIGNIFICANT CHANGE UP (ref 10–45)
ANION GAP SERPL CALC-SCNC: 11 MMOL/L — SIGNIFICANT CHANGE UP (ref 5–17)
ANION GAP SERPL CALC-SCNC: 16 MMOL/L — SIGNIFICANT CHANGE UP (ref 5–17)
APTT BLD: 32.8 SEC — SIGNIFICANT CHANGE UP (ref 27.5–35.5)
APTT BLD: 63.1 SEC — HIGH (ref 27.5–35.5)
AST SERPL-CCNC: 17 U/L — SIGNIFICANT CHANGE UP (ref 10–40)
BASE EXCESS BLDV CALC-SCNC: -0.2 MMOL/L — SIGNIFICANT CHANGE UP (ref -2–2)
BASE EXCESS BLDV CALC-SCNC: -0.6 MMOL/L — SIGNIFICANT CHANGE UP (ref -2–2)
BASE EXCESS BLDV CALC-SCNC: -1.4 MMOL/L — SIGNIFICANT CHANGE UP (ref -2–2)
BASOPHILS # BLD AUTO: 0 K/UL — SIGNIFICANT CHANGE UP (ref 0–0.2)
BASOPHILS NFR BLD AUTO: 0 % — SIGNIFICANT CHANGE UP (ref 0–2)
BILIRUB SERPL-MCNC: 0.8 MG/DL — SIGNIFICANT CHANGE UP (ref 0.2–1.2)
BLD GP AB SCN SERPL QL: NEGATIVE — SIGNIFICANT CHANGE UP
BLOOD GAS VENOUS - CREATININE: SIGNIFICANT CHANGE UP MG/DL (ref 0.5–1.3)
BUN SERPL-MCNC: 11 MG/DL — SIGNIFICANT CHANGE UP (ref 7–23)
BUN SERPL-MCNC: 13 MG/DL — SIGNIFICANT CHANGE UP (ref 7–23)
CA-I SERPL-SCNC: 0.95 MMOL/L — LOW (ref 1.12–1.3)
CA-I SERPL-SCNC: 1.01 MMOL/L — LOW (ref 1.12–1.3)
CA-I SERPL-SCNC: 1.03 MMOL/L — LOW (ref 1.12–1.3)
CALCIUM SERPL-MCNC: 8.9 MG/DL — SIGNIFICANT CHANGE UP (ref 8.4–10.5)
CALCIUM SERPL-MCNC: 9.3 MG/DL — SIGNIFICANT CHANGE UP (ref 8.4–10.5)
CHLORIDE BLDV-SCNC: SIGNIFICANT CHANGE UP MMOL/L (ref 96–108)
CHLORIDE SERPL-SCNC: 107 MMOL/L — SIGNIFICANT CHANGE UP (ref 96–108)
CHLORIDE SERPL-SCNC: 107 MMOL/L — SIGNIFICANT CHANGE UP (ref 96–108)
CK MB BLD-MCNC: 8 % — HIGH (ref 0–3.5)
CK MB CFR SERPL CALC: 8.1 NG/ML — HIGH (ref 0–3.8)
CK SERPL-CCNC: 101 U/L — SIGNIFICANT CHANGE UP (ref 25–170)
CO2 SERPL-SCNC: 18 MMOL/L — LOW (ref 22–31)
CO2 SERPL-SCNC: 19 MMOL/L — LOW (ref 22–31)
CREAT SERPL-MCNC: 0.78 MG/DL — SIGNIFICANT CHANGE UP (ref 0.5–1.3)
CREAT SERPL-MCNC: 1.21 MG/DL — SIGNIFICANT CHANGE UP (ref 0.5–1.3)
EOSINOPHIL # BLD AUTO: 0.38 K/UL — SIGNIFICANT CHANGE UP (ref 0–0.5)
EOSINOPHIL NFR BLD AUTO: 2 % — SIGNIFICANT CHANGE UP (ref 0–6)
FIBRINOGEN PPP-MCNC: 508 MG/DL — SIGNIFICANT CHANGE UP (ref 290–520)
GAS PNL BLDA: SIGNIFICANT CHANGE UP
GAS PNL BLDV: 136 MMOL/L — SIGNIFICANT CHANGE UP (ref 135–145)
GAS PNL BLDV: 137 MMOL/L — SIGNIFICANT CHANGE UP (ref 135–145)
GAS PNL BLDV: 138 MMOL/L — SIGNIFICANT CHANGE UP (ref 135–145)
GAS PNL BLDV: SIGNIFICANT CHANGE UP
GLUCOSE BLDC GLUCOMTR-MCNC: 106 MG/DL — HIGH (ref 70–99)
GLUCOSE BLDC GLUCOMTR-MCNC: 108 MG/DL — HIGH (ref 70–99)
GLUCOSE BLDC GLUCOMTR-MCNC: 132 MG/DL — HIGH (ref 70–99)
GLUCOSE BLDC GLUCOMTR-MCNC: 137 MG/DL — HIGH (ref 70–99)
GLUCOSE BLDC GLUCOMTR-MCNC: 137 MG/DL — HIGH (ref 70–99)
GLUCOSE BLDC GLUCOMTR-MCNC: 144 MG/DL — HIGH (ref 70–99)
GLUCOSE BLDC GLUCOMTR-MCNC: 157 MG/DL — HIGH (ref 70–99)
GLUCOSE BLDC GLUCOMTR-MCNC: 163 MG/DL — HIGH (ref 70–99)
GLUCOSE BLDC GLUCOMTR-MCNC: 183 MG/DL — HIGH (ref 70–99)
GLUCOSE BLDC GLUCOMTR-MCNC: 69 MG/DL — LOW (ref 70–99)
GLUCOSE BLDC GLUCOMTR-MCNC: 94 MG/DL — SIGNIFICANT CHANGE UP (ref 70–99)
GLUCOSE BLDV-MCNC: 128 MG/DL — HIGH (ref 70–99)
GLUCOSE BLDV-MCNC: 94 MG/DL — SIGNIFICANT CHANGE UP (ref 70–99)
GLUCOSE BLDV-MCNC: 99 MG/DL — SIGNIFICANT CHANGE UP (ref 70–99)
GLUCOSE SERPL-MCNC: 130 MG/DL — HIGH (ref 70–99)
GLUCOSE SERPL-MCNC: 91 MG/DL — SIGNIFICANT CHANGE UP (ref 70–99)
HCO3 BLDV-SCNC: 24 MMOL/L — SIGNIFICANT CHANGE UP (ref 21–29)
HCO3 BLDV-SCNC: 24 MMOL/L — SIGNIFICANT CHANGE UP (ref 21–29)
HCO3 BLDV-SCNC: 25 MMOL/L — SIGNIFICANT CHANGE UP (ref 21–29)
HCT VFR BLD CALC: 26.4 % — LOW (ref 34.5–45)
HCT VFR BLD CALC: 30.6 % — LOW (ref 34.5–45)
HCT VFR BLDA CALC: 23 % — LOW (ref 39–50)
HCT VFR BLDA CALC: 23 % — LOW (ref 39–50)
HCT VFR BLDA CALC: 25 % — LOW (ref 39–50)
HEPARINASE TEG R TIME: 6.1 MIN — SIGNIFICANT CHANGE UP (ref 4.3–8.3)
HGB BLD CALC-MCNC: 7.2 G/DL — LOW (ref 11.5–15.5)
HGB BLD CALC-MCNC: 7.3 G/DL — LOW (ref 11.5–15.5)
HGB BLD CALC-MCNC: 7.9 G/DL — LOW (ref 11.5–15.5)
HGB BLD-MCNC: 10.2 G/DL — LOW (ref 11.5–15.5)
HGB BLD-MCNC: 8.7 G/DL — LOW (ref 11.5–15.5)
HOROWITZ INDEX BLDV+IHG-RTO: 0 — SIGNIFICANT CHANGE UP
INR BLD: 0.98 RATIO — SIGNIFICANT CHANGE UP (ref 0.88–1.16)
INR BLD: 1.12 RATIO — SIGNIFICANT CHANGE UP (ref 0.88–1.16)
LACTATE BLDV-MCNC: 0.8 MMOL/L — SIGNIFICANT CHANGE UP (ref 0.7–2)
LACTATE BLDV-MCNC: 1.3 MMOL/L — SIGNIFICANT CHANGE UP (ref 0.7–2)
LACTATE BLDV-MCNC: 1.3 MMOL/L — SIGNIFICANT CHANGE UP (ref 0.7–2)
LYMPHOCYTES # BLD AUTO: 0.96 K/UL — LOW (ref 1–3.3)
LYMPHOCYTES # BLD AUTO: 5 % — LOW (ref 13–44)
MAGNESIUM SERPL-MCNC: 2.9 MG/DL — HIGH (ref 1.6–2.6)
MANUAL SMEAR VERIFICATION: SIGNIFICANT CHANGE UP
MCHC RBC-ENTMCNC: 30.8 PG — SIGNIFICANT CHANGE UP (ref 27–34)
MCHC RBC-ENTMCNC: 31 PG — SIGNIFICANT CHANGE UP (ref 27–34)
MCHC RBC-ENTMCNC: 33 GM/DL — SIGNIFICANT CHANGE UP (ref 32–36)
MCHC RBC-ENTMCNC: 33.3 GM/DL — SIGNIFICANT CHANGE UP (ref 32–36)
MCV RBC AUTO: 92.4 FL — SIGNIFICANT CHANGE UP (ref 80–100)
MCV RBC AUTO: 94 FL — SIGNIFICANT CHANGE UP (ref 80–100)
MONOCYTES # BLD AUTO: 0.38 K/UL — SIGNIFICANT CHANGE UP (ref 0–0.9)
MONOCYTES NFR BLD AUTO: 2 % — SIGNIFICANT CHANGE UP (ref 2–14)
MYELOCYTES NFR BLD: 1 % — HIGH (ref 0–0)
NEUTROPHILS # BLD AUTO: 17.32 K/UL — HIGH (ref 1.8–7.4)
NEUTROPHILS NFR BLD AUTO: 87 % — HIGH (ref 43–77)
NEUTS BAND # BLD: 3 % — SIGNIFICANT CHANGE UP (ref 0–8)
NRBC # BLD: 0 /100 WBCS — SIGNIFICANT CHANGE UP (ref 0–0)
NRBC # BLD: 0 /100 — SIGNIFICANT CHANGE UP (ref 0–0)
PCO2 BLDV: 43 MMHG — SIGNIFICANT CHANGE UP (ref 35–50)
PCO2 BLDV: 45 MMHG — SIGNIFICANT CHANGE UP (ref 35–50)
PCO2 BLDV: 50 MMHG — SIGNIFICANT CHANGE UP (ref 35–50)
PH BLDV: 7.32 — LOW (ref 7.35–7.45)
PH BLDV: 7.34 — LOW (ref 7.35–7.45)
PH BLDV: 7.37 — SIGNIFICANT CHANGE UP (ref 7.35–7.45)
PHOSPHATE SERPL-MCNC: 8.1 MG/DL — HIGH (ref 2.5–4.5)
PLAT MORPH BLD: NORMAL — SIGNIFICANT CHANGE UP
PLATELET # BLD AUTO: 127 K/UL — LOW (ref 150–400)
PLATELET # BLD AUTO: 188 K/UL — SIGNIFICANT CHANGE UP (ref 150–400)
PO2 BLDV: 67 MMHG — HIGH (ref 25–45)
PO2 BLDV: 67 MMHG — HIGH (ref 25–45)
PO2 BLDV: 73 MMHG — HIGH (ref 25–45)
POTASSIUM BLDV-SCNC: 4.2 MMOL/L — SIGNIFICANT CHANGE UP (ref 3.5–5)
POTASSIUM BLDV-SCNC: 5.3 MMOL/L — HIGH (ref 3.5–5)
POTASSIUM BLDV-SCNC: 5.5 MMOL/L — HIGH (ref 3.5–5)
POTASSIUM SERPL-MCNC: 4.4 MMOL/L — SIGNIFICANT CHANGE UP (ref 3.5–5.3)
POTASSIUM SERPL-MCNC: 4.7 MMOL/L — SIGNIFICANT CHANGE UP (ref 3.5–5.3)
POTASSIUM SERPL-SCNC: 4.4 MMOL/L — SIGNIFICANT CHANGE UP (ref 3.5–5.3)
POTASSIUM SERPL-SCNC: 4.7 MMOL/L — SIGNIFICANT CHANGE UP (ref 3.5–5.3)
PROT SERPL-MCNC: 4.8 G/DL — LOW (ref 6–8.3)
PROTHROM AB SERPL-ACNC: 11.8 SEC — SIGNIFICANT CHANGE UP (ref 10.6–13.6)
PROTHROM AB SERPL-ACNC: 13.4 SEC — SIGNIFICANT CHANGE UP (ref 10.6–13.6)
RAPIDTEG MAXIMUM AMPLITUDE: 63.7 MM — SIGNIFICANT CHANGE UP (ref 52–70)
RBC # BLD: 2.81 M/UL — LOW (ref 3.8–5.2)
RBC # BLD: 3.31 M/UL — LOW (ref 3.8–5.2)
RBC # FLD: 14.3 % — SIGNIFICANT CHANGE UP (ref 10.3–14.5)
RBC # FLD: 14.5 % — SIGNIFICANT CHANGE UP (ref 10.3–14.5)
RBC BLD AUTO: SIGNIFICANT CHANGE UP
RH IG SCN BLD-IMP: POSITIVE — SIGNIFICANT CHANGE UP
SAO2 % BLDV: 91 % — HIGH (ref 67–88)
SAO2 % BLDV: 93 % — HIGH (ref 67–88)
SAO2 % BLDV: 95 % — HIGH (ref 67–88)
SODIUM SERPL-SCNC: 137 MMOL/L — SIGNIFICANT CHANGE UP (ref 135–145)
SODIUM SERPL-SCNC: 141 MMOL/L — SIGNIFICANT CHANGE UP (ref 135–145)
TEG FUNCTIONAL FIBRINOGEN: 28 MM — SIGNIFICANT CHANGE UP (ref 15–32)
TEG MAXIMUM AMPLITUDE: 64.5 MM — SIGNIFICANT CHANGE UP (ref 52–69)
TEG REACTION TIME: 4.9 MIN — SIGNIFICANT CHANGE UP (ref 4.6–9.1)
TROPONIN T, HIGH SENSITIVITY RESULT: 467 NG/L — HIGH (ref 0–51)
WBC # BLD: 19.24 K/UL — HIGH (ref 3.8–10.5)
WBC # BLD: 8.75 K/UL — SIGNIFICANT CHANGE UP (ref 3.8–10.5)
WBC # FLD AUTO: 19.24 K/UL — HIGH (ref 3.8–10.5)
WBC # FLD AUTO: 8.75 K/UL — SIGNIFICANT CHANGE UP (ref 3.8–10.5)

## 2021-07-15 PROCEDURE — 99291 CRITICAL CARE FIRST HOUR: CPT

## 2021-07-15 PROCEDURE — 99232 SBSQ HOSP IP/OBS MODERATE 35: CPT

## 2021-07-15 PROCEDURE — 71045 X-RAY EXAM CHEST 1 VIEW: CPT | Mod: 26

## 2021-07-15 PROCEDURE — 93010 ELECTROCARDIOGRAM REPORT: CPT

## 2021-07-15 PROCEDURE — 33534 CABG ARTERIAL TWO: CPT

## 2021-07-15 RX ORDER — IPRATROPIUM/ALBUTEROL SULFATE 18-103MCG
3 AEROSOL WITH ADAPTER (GRAM) INHALATION EVERY 8 HOURS
Refills: 0 | Status: DISCONTINUED | OUTPATIENT
Start: 2021-07-15 | End: 2021-07-22

## 2021-07-15 RX ORDER — ATORVASTATIN CALCIUM 80 MG/1
40 TABLET, FILM COATED ORAL AT BEDTIME
Refills: 0 | Status: DISCONTINUED | OUTPATIENT
Start: 2021-07-15 | End: 2021-07-22

## 2021-07-15 RX ORDER — CHLORHEXIDINE GLUCONATE 213 G/1000ML
15 SOLUTION TOPICAL EVERY 12 HOURS
Refills: 0 | Status: DISCONTINUED | OUTPATIENT
Start: 2021-07-15 | End: 2021-07-15

## 2021-07-15 RX ORDER — VASOPRESSIN 20 [USP'U]/ML
0.02 INJECTION INTRAVENOUS
Qty: 50 | Refills: 0 | Status: DISCONTINUED | OUTPATIENT
Start: 2021-07-15 | End: 2021-07-15

## 2021-07-15 RX ORDER — SODIUM CHLORIDE 9 MG/ML
1000 INJECTION INTRAMUSCULAR; INTRAVENOUS; SUBCUTANEOUS
Refills: 0 | Status: DISCONTINUED | OUTPATIENT
Start: 2021-07-15 | End: 2021-07-22

## 2021-07-15 RX ORDER — CHLORHEXIDINE GLUCONATE 213 G/1000ML
1 SOLUTION TOPICAL
Refills: 0 | Status: DISCONTINUED | OUTPATIENT
Start: 2021-07-15 | End: 2021-07-22

## 2021-07-15 RX ORDER — DEXTROSE 50 % IN WATER 50 %
25 SYRINGE (ML) INTRAVENOUS
Refills: 0 | Status: DISCONTINUED | OUTPATIENT
Start: 2021-07-15 | End: 2021-07-15

## 2021-07-15 RX ORDER — MEPERIDINE HYDROCHLORIDE 50 MG/ML
25 INJECTION INTRAMUSCULAR; INTRAVENOUS; SUBCUTANEOUS ONCE
Refills: 0 | Status: DISCONTINUED | OUTPATIENT
Start: 2021-07-15 | End: 2021-07-15

## 2021-07-15 RX ORDER — PANTOPRAZOLE SODIUM 20 MG/1
40 TABLET, DELAYED RELEASE ORAL DAILY
Refills: 0 | Status: DISCONTINUED | OUTPATIENT
Start: 2021-07-15 | End: 2021-07-16

## 2021-07-15 RX ORDER — FENTANYL CITRATE 50 UG/ML
50 INJECTION INTRAVENOUS ONCE
Refills: 0 | Status: DISCONTINUED | OUTPATIENT
Start: 2021-07-15 | End: 2021-07-15

## 2021-07-15 RX ORDER — BUDESONIDE, MICRONIZED 100 %
0.5 POWDER (GRAM) MISCELLANEOUS EVERY 12 HOURS
Refills: 0 | Status: DISCONTINUED | OUTPATIENT
Start: 2021-07-15 | End: 2021-07-16

## 2021-07-15 RX ORDER — ASPIRIN/CALCIUM CARB/MAGNESIUM 324 MG
300 TABLET ORAL ONCE
Refills: 0 | Status: DISCONTINUED | OUTPATIENT
Start: 2021-07-15 | End: 2021-07-15

## 2021-07-15 RX ORDER — ALBUMIN HUMAN 25 %
250 VIAL (ML) INTRAVENOUS ONCE
Refills: 0 | Status: COMPLETED | OUTPATIENT
Start: 2021-07-15 | End: 2021-07-15

## 2021-07-15 RX ORDER — ALBUMIN HUMAN 25 %
250 VIAL (ML) INTRAVENOUS
Refills: 0 | Status: COMPLETED | OUTPATIENT
Start: 2021-07-15 | End: 2021-07-15

## 2021-07-15 RX ORDER — POTASSIUM CHLORIDE 20 MEQ
10 PACKET (EA) ORAL
Refills: 0 | Status: DISCONTINUED | OUTPATIENT
Start: 2021-07-15 | End: 2021-07-15

## 2021-07-15 RX ORDER — INSULIN HUMAN 100 [IU]/ML
3 INJECTION, SOLUTION SUBCUTANEOUS
Qty: 100 | Refills: 0 | Status: DISCONTINUED | OUTPATIENT
Start: 2021-07-15 | End: 2021-07-16

## 2021-07-15 RX ORDER — NOREPINEPHRINE BITARTRATE/D5W 8 MG/250ML
0.05 PLASTIC BAG, INJECTION (ML) INTRAVENOUS
Qty: 8 | Refills: 0 | Status: DISCONTINUED | OUTPATIENT
Start: 2021-07-15 | End: 2021-07-16

## 2021-07-15 RX ORDER — ACETAMINOPHEN 500 MG
1000 TABLET ORAL ONCE
Refills: 0 | Status: COMPLETED | OUTPATIENT
Start: 2021-07-15 | End: 2021-07-15

## 2021-07-15 RX ORDER — CEFUROXIME AXETIL 250 MG
1500 TABLET ORAL EVERY 8 HOURS
Refills: 0 | Status: COMPLETED | OUTPATIENT
Start: 2021-07-15 | End: 2021-07-17

## 2021-07-15 RX ORDER — CHLORHEXIDINE GLUCONATE 213 G/1000ML
5 SOLUTION TOPICAL
Refills: 0 | Status: DISCONTINUED | OUTPATIENT
Start: 2021-07-15 | End: 2021-07-15

## 2021-07-15 RX ORDER — DEXTROSE 50 % IN WATER 50 %
50 SYRINGE (ML) INTRAVENOUS
Refills: 0 | Status: DISCONTINUED | OUTPATIENT
Start: 2021-07-15 | End: 2021-07-15

## 2021-07-15 RX ORDER — POLYETHYLENE GLYCOL 3350 17 G/17G
17 POWDER, FOR SOLUTION ORAL DAILY
Refills: 0 | Status: DISCONTINUED | OUTPATIENT
Start: 2021-07-15 | End: 2021-07-22

## 2021-07-15 RX ORDER — ASPIRIN/CALCIUM CARB/MAGNESIUM 324 MG
81 TABLET ORAL DAILY
Refills: 0 | Status: DISCONTINUED | OUTPATIENT
Start: 2021-07-15 | End: 2021-07-22

## 2021-07-15 RX ADMIN — Medication 125 MILLILITER(S): at 15:29

## 2021-07-15 RX ADMIN — INSULIN HUMAN 3 UNIT(S)/HR: 100 INJECTION, SOLUTION SUBCUTANEOUS at 21:33

## 2021-07-15 RX ADMIN — FENTANYL CITRATE 50 MICROGRAM(S): 50 INJECTION INTRAVENOUS at 17:30

## 2021-07-15 RX ADMIN — FENTANYL CITRATE 50 MICROGRAM(S): 50 INJECTION INTRAVENOUS at 19:07

## 2021-07-15 RX ADMIN — Medication 4.85 MICROGRAM(S)/KG/MIN: at 21:34

## 2021-07-15 RX ADMIN — Medication 400 MILLIGRAM(S): at 16:30

## 2021-07-15 RX ADMIN — Medication 0.5 MILLIGRAM(S): at 18:18

## 2021-07-15 RX ADMIN — Medication 100 MILLIGRAM(S): at 15:30

## 2021-07-15 RX ADMIN — Medication 125 MILLILITER(S): at 21:34

## 2021-07-15 RX ADMIN — Medication 125 MILLILITER(S): at 20:39

## 2021-07-15 RX ADMIN — CHLORHEXIDINE GLUCONATE 30 MILLILITER(S): 213 SOLUTION TOPICAL at 05:19

## 2021-07-15 RX ADMIN — Medication 125 MILLILITER(S): at 16:51

## 2021-07-15 RX ADMIN — SODIUM CHLORIDE 1 GRAM(S): 9 INJECTION INTRAMUSCULAR; INTRAVENOUS; SUBCUTANEOUS at 05:20

## 2021-07-15 RX ADMIN — GABAPENTIN 300 MILLIGRAM(S): 400 CAPSULE ORAL at 05:20

## 2021-07-15 RX ADMIN — Medication 81 MILLIGRAM(S): at 23:49

## 2021-07-15 RX ADMIN — CHLORHEXIDINE GLUCONATE 15 MILLILITER(S): 213 SOLUTION TOPICAL at 18:32

## 2021-07-15 RX ADMIN — Medication 3 MILLILITER(S): at 18:17

## 2021-07-15 RX ADMIN — Medication 1000 MILLIGRAM(S): at 19:07

## 2021-07-15 RX ADMIN — Medication 3 MILLILITER(S): at 22:48

## 2021-07-15 RX ADMIN — CILOSTAZOL 100 MILLIGRAM(S): 100 TABLET ORAL at 05:20

## 2021-07-15 RX ADMIN — Medication 100 MILLIGRAM(S): at 23:49

## 2021-07-15 RX ADMIN — Medication 12.5 MILLIGRAM(S): at 05:23

## 2021-07-15 NOTE — PROGRESS NOTE ADULT - SUBJECTIVE AND OBJECTIVE BOX
Patient seen and examined at the bedside.    Remained critically ill on continuous ICU monitoring.    OBJECTIVE:  Vital Signs Last 24 Hrs  T(C): 36.1 (15 Jul 2021 16:00), Max: 36.7 (2021 21:35)  T(F): 97 (15 Jul 2021 16:00), Max: 98.1 (2021 21:35)  HR: 61 (15 Jul 2021 19:15) (59 - 89)  BP: 109/66 (15 Jul 2021 04:24) (109/66 - 128/75)  BP(mean): --  RR: 16 (15 Jul 2021 19:15) (0 - 37)  SpO2: 100% (15 Jul 2021 19:15) (90% - 100%)    REVIEW OF SYSTEMS:  [x ] Unable to assess ROS due to patient being intubated     Physical Exam:  General: intubated   Neurology: A&Ox3, nonfocal, COTO x 4  Eyes: PERRLA/ EOMI, Gross vision intact  ENT/Neck: Neck supple, trachea midline, No JVD, Gross hearing intact  Respiratory: No wheezing, rhonchi. Rales noted bilaterally.  CV: RRR, S1S2, no murmurs, rubs or gallops        [] Sternal dressing, [x] Mediastinal CT, [] Pleural CT, [] CARLOS MANUEL drain        [] Sinus rhythm, [] Afib, [] Temporary pacing, [] PPM  Abdominal: Soft, NT, ND +BS,   Extremities: 1-2+ pedal edema noted, + peripheral pulses  Skin: No Rashes, Hematoma, Ecchymosis                         LINES:  [ x] Arterial Line   [x ] Central Line  [ ] PA catheter  [ ] IABP  [ ] ECMO  [ ] LVAD  [ x] Ventilator  [x ] pacemaker [ ] Impella      RADIOLOGY:  Intra-Operative Transesophageal Echo (07.15.21 @ 10:49)   Conclusions:  1. Normal mitral valve. Minimal mitral regurgitation.  calcium on anterior leaflet and in anterolateral papillary  muscle  2. Normal trileaflet aorticvalve. minimal restricted  mobility of non coronary cusp.  planimetry area =0.98cm2  Minimal aortic regurgitation.  3. Mild segmental left ventricular systolic dysfunction.  infero septal wall mild to moderate hypokinesis, variable  with blood pressure. other walls normal motion  4. Normal diastolic function  5. Normal tricuspid valve. Minimal tricuspid regurgitation.  6. Small pericardial effusion.      Assessment:  75y/o  F with COPD , HTN and PVD recent stress test that was abnormal on 21 and presented  for cardiac work up     CAD s/p C2L w/ L radial harvest, POD #0   Acute blood loss anemia s/p 2 PRBCs   Hypovolemia   Hx of COPD / current smoker    Preop hyponatremia   Hx of PVD    Stress hyperglycemia     Plan:   ***Neuro***  [x] Nonfocal  [] Sedated  [] Motor Strength  [] Speech    ***Cardiovascular***  CAD s/p C2L w/ L radial harvest, POD #0   Hematocrit 29%, lactate 0.7 S/P 2U pRBC   Transfuse pRBCs if remain hemodynamically unstable   AV paced post op for rob / now VVI back up pacing / monitor for Rhythm abnormalities  Post operative labile hemodynamics / continue to monitor closely   Plan for IV Cardene post extubation for radial graft   Hx of PVD / home cilostazol and telmisartan- hydrochlorothiazide   Hypovolemia    Hemodynamic lability, instability. Requires IVCD []  []Epi  []Primacor  [x]Levo  [x] Vaso  [] Cardene  [] Nitroprusside  Antiplatelet therapy: [x] ASA  [] Brilinta  [] Plavix   Chest tubes: [] actively bleeding [] non-bleeding    CHF- acute [ x]   chronic [x ]    systolic [ x]   diatolic [ ]          - Echo- EF -  45%     ***Pulmonary***    Ventilator Management:  [x]AC-rest  [] CPAP-PS Wean  []Trach Collar  []Extubate  [] T-Piece  []peep>5   Mode: AC/ CMV (Assist Control/ Continuous Mandatory Ventilation)  RR (machine): 16  TV (machine): 450  FiO2: 50  PEEP: 5  ITime: 1  MAP: 10  PIP: 21    Hx of COPD / Resume inhalers   Will plan to wean & extubate once pt is hemodynamically stable.     ***GI***  NPO post operative, advance diet as tolerated     Stress ulcer prophylaxis: [x] Protonix  [] Pepcid  Bowel regimen with Miralax     ***Renal***  Continue to monitor I/Os, BUN/Creatinine.   Preop hyponatremia /   Replete lytes PRN. Keep K> 4 and Mg >2.    I&O's Summary    2021 07:01  -  15 Jul 2021 07:00  --------------------------------------------------------  IN: 597 mL / OUT: 800 mL / NET: -203 mL    15 Jul 2021 07:01  -  15 Jul 2021 19:22  --------------------------------------------------------  IN: 776.4 mL / OUT: 795 mL / NET: -18.7 mL      ***ID***  Afebrile, WBC rising 8.75->19.24   Continue trending WBC and monitoring fever curve   Perioperative coverage with Cefuroxime     ***Endocrine***  [x] Hyperglycemia  [] DM2 [] DM1 [] Prediabetes : HbA1c 5.4%             - [x] Insulin gtt  [] ISS  [] NPH  [] Lantus             - Need tight glycemic control to prevent wound infection.        Patient requires continuous monitoring with bedside rhythm monitoring, pulse oximetry monitoring, and continuous central venous and arterial pressure monitoring; and intermittent blood gas analysis. Care plan discussed with the ICU care team.   Patient remained critical, at risk for life threatening decompensation.    I have spent 30 minutes providing critical care management to this patient.    By signing my name below, I, Norma Hendricks, attest that this documentation has been prepared under the direction and in the presence of Kayden Lu MD   Electronically signed: Kellen Devine, 07-15-21 @ 19:22    I, Kayden Lu, personally performed the services described in this documentation. all medical record entries made by the titoibshay were at my direction and in my presence. I have reviewed the chart and agree that the record reflects my personal performance and is accurate and complete  Electronically signed: Kayden Lu MD  Patient seen and examined at the bedside.    Remained critically ill on continuous ICU monitoring.    OBJECTIVE:  Vital Signs Last 24 Hrs  T(C): 36.1 (15 Jul 2021 16:00), Max: 36.7 (14 Jul 2021 21:35)  T(F): 97 (15 Jul 2021 16:00), Max: 98.1 (14 Jul 2021 21:35)  HR: 61 (15 Jul 2021 19:15) (59 - 89)  BP: 109/66 (15 Jul 2021 04:24) (109/66 - 128/75)  BP(mean): --  RR: 16 (15 Jul 2021 19:15) (0 - 37)  SpO2: 100% (15 Jul 2021 19:15) (90% - 100%)    REVIEW OF SYSTEMS:  [x ] Unable to assess ROS due to patient being intubated     Physical Exam:  General: intubated, multiple lines & gtt   Neurology: Eye opening , nonfocal, COTO x 4  Eyes: PERRLA/ EOMI, Gross vision intact  ENT/Neck: Neck supple, + Trac trachea midline, No JVD, Gross hearing intact  Respiratory: + occasional wheezing / basilar fine rales   CV: RRR, S1S2, no murmurs, rubs or gallops        [x] Sternal dressing, [x] Mediastinal CT, [x] Pleural CT,         [x] Sinus rhythm, , [x] Temporary pacing,   Abdominal: Soft, NT, ND +BS,   Extremities: 1-2+ pedal edema noted, + peripheral pulses  Skin: No Rashes, Hematoma, Ecchymosis                         LINES:  [ x] Arterial Line   [x ] Central Line    [ x] Ventilator  [x ] pacemaker VVI 50/10       RADIOLOGY:  Intra-Operative Transesophageal Echo (07.15.21 @ 10:49)   Conclusions:  1. Normal mitral valve. Minimal mitral regurgitation.  calcium on anterior leaflet and in anterolateral papillary  muscle  2. Normal trileaflet aorticvalve. minimal restricted  mobility of non coronary cusp.  planimetry area =0.98cm2  Minimal aortic regurgitation.  3. Mild segmental left ventricular systolic dysfunction.  infero septal wall mild to moderate hypokinesis, variable  with blood pressure. other walls normal motion  4. Normal diastolic function  5. Normal tricuspid valve. Minimal tricuspid regurgitation.  6. Small pericardial effusion.      Assessment:  75y/o  F with Active smoking 2 PPD for last 54 yr  COPD , HTN and PVD recent stress test that was abnormal on 6/22/21 and presented  for cardiac work up     Multivessel CAD / MR S/P  C2L w/ L radial harvest, POD #0   Acute blood loss anemia s/p 2 PRBCs   SB/ AV Pacing /   Hypovolemia / hypotension    Hx of COPD / current smoker /   Preop hyponatremia /ZAC   Hx of PVD    Stress hyperglycemia     Plan:   ***Neuro***  [x] Nonfocal      ***Cardiovascular***  CAD / PVD/ Moderate LV Systolic dysfunction / Moderate MR  s/p C2L w/ L radial harvest, POD #0   Hematocrit 29%, lactate 0.7 S/P 2U PRBC   Aim to maintain higher Hct given PVD/CAD/LV dysfunction     AV paced post op for rob / now VVI back up pacing / monitor for Rhythm abnormalities  Post operative labile hemodynamics / Hemodynamically significant hypovolemia /   S/P Albumin 500cc x2      Post  extubation for radial graft will require CCB     Hemodynamic lability, instability. Requires IVCD  [x]Levo  [x] Vaso   Antiplatelet therapy: [x] ASA    Will resume Cilostazol / Dipyridamole   Chest tubes: [] actively bleeding [X] non-bleeding    EKG / CARDIAC Enzymes  Start high dose statins once extubated   Monitor LE pulses     ***Pulmonary***    Ventilator Management:  [x]AC-rest  [] CPAP-PS Wean  []Trach Collar  []Extubate  [] T-Piece  []peep>5   Mode: AC/ CMV (Assist Control/ Continuous Mandatory Ventilation)  RR (machine): 16  TV (machine): 450  FiO2: 50  PEEP: 5  ITime: 1  MAP: 10  PIP: 21    COPD / Resume inhalers / steroids / At high risk for pulmonary complications   Will plan to wean & extubate once pt is hemodynamically stable.     ***GI***  NPO post operative, advance diet as tolerated     Stress ulcer prophylaxis: [x] Protonix  [] Pepcid  Bowel regimen with Miralax     ***Renal***  Continue to monitor I/Os, BUN/Creatinine.   Preop hyponatremia /   Replete lytes PRN. Keep K> 4 and Mg >2.    I&O's Summary    14 Jul 2021 07:01  -  15 Jul 2021 07:00  --------------------------------------------------------  IN: 597 mL / OUT: 800 mL / NET: -203 mL    15 Jul 2021 07:01  -  15 Jul 2021 19:22  --------------------------------------------------------  IN: 776.4 mL / OUT: 795 mL / NET: -18.7 mL      ***ID***  Afebrile, WBC rising 8.75->19.24   Continue trending WBC and monitoring fever curve   Perioperative coverage with Cefuroxime     ***Endocrine***  [x] Hyperglycemia  [] DM2 [] DM1 [] Prediabetes : HbA1c 5.4%             - [x] Insulin gtt  [] ISS  [] NPH  [] Lantus             - Need tight glycemic control to prevent wound infection.        Patient requires continuous monitoring with bedside rhythm monitoring, pulse oximetry monitoring, and continuous central venous and arterial pressure monitoring; and intermittent blood gas analysis. Care plan discussed with the ICU care team.   Patient remained critical, at risk for life threatening decompensation.    I have spent 30 minutes providing critical care management to this patient.    By signing my name below, I, Norma Hendricks, attest that this documentation has been prepared under the direction and in the presence of Kayden Lu MD   Electronically signed: Kellen Devine, 07-15-21 @ 19:22    I, Kayden Lu, personally performed the services described in this documentation. all medical record entries made by the scribe were at my direction and in my presence. I have reviewed the chart and agree that the record reflects my personal performance and is accurate and complete  Electronically signed: Kayden Lu MD

## 2021-07-15 NOTE — PROGRESS NOTE ADULT - SUBJECTIVE AND OBJECTIVE BOX
CHIEF COMPLAINT: f/up sob, preop resp, copd--"good-ready for surgery", minimal sob/cough    Interval Events: nephro/cards f/up; for OHS today    REVIEW OF SYSTEMS:  Constitutional: No fevers or chills. No weight loss. No fatigue or generalized malaise.  Eyes: No itching or discharge from the eyes  ENT: No ear pain. No ear discharge. No nasal congestion. No post nasal drip. No epistaxis. No throat pain. No sore throat. No difficulty swallowing.   CV: No chest pain. No palpitations. No lightheadedness or dizziness.   Resp: No dyspnea at rest. + dyspnea on exertion. No orthopnea. No wheezing. No cough. No stridor. No sputum production. No chest pain with respiration.  GI: No nausea. No vomiting. No diarrhea.  MSK: No joint pain or pain in any extremities  Integumentary: No skin lesions. No pedal edema.  Neurological: No gross motor weakness. No sensory changes.  [+ ] All other systems negative  [ ] Unable to assess ROS because ________    OBJECTIVE:  ICU Vital Signs Last 24 Hrs  T(C): 36.6 (15 Jul 2021 04:24), Max: 36.7 (2021 21:35)  T(F): 97.9 (15 Jul 2021 04:24), Max: 98.1 (2021 21:35)  HR: 65 (15 Jul 2021 04:24) (65 - 72)  BP: 109/66 (15 Jul 2021 04:24) (107/69 - 128/75)  BP(mean): --  ABP: --  ABP(mean): --  RR: 18 (15 Jul 2021 04:24) (17 - 18)  SpO2: 96% (15 Jul 2021 04:24) (94% - 96%)        13 @ 07:  -  -14 @ 07:00  --------------------------------------------------------  IN: 540 mL / OUT: 700 mL / NET: -160 mL     @ 07:  -  07-15 @ 05:48  --------------------------------------------------------  IN: 597 mL / OUT: 800 mL / NET: -203 mL      CAPILLARY BLOOD GLUCOSE          PHYSICAL EXAM:  General: Awake, alert, oriented X 3.   HEENT: Atraumatic, normocephalic.                 Mallampatti Grade 2                No nasal congestion.                No tonsillar or pharyngeal exudates.  Lymph Nodes: No palpable lymphadenopathy  Neck: No JVD. No carotid bruit.   Respiratory: Normal chest expansion                         Normal percussion                         Normal and equal air entry                         very mild exp wheeze, no rhonchi or rales.  Cardiovascular: S1 S2 normal. No murmurs, rubs or gallops.   Abdomen: Soft, non-tender, non-distended. No organomegaly. Normoactive bowel sounds.  Extremities: Warm to touch. Peripheral pulse palpable. No pedal edema.   Skin: No rashes or skin lesions  Neurological: Motor and sensory examination equal and normal in all four extremities.  Psychiatry: Appropriate mood and affect.    HOSPITAL MEDICATIONS:  MEDICATIONS  (STANDING):  aspirin enteric coated 81 milliGRAM(s) Oral daily  atorvastatin 20 milliGRAM(s) Oral at bedtime  buPROPion SR (12-Hour) 150 milliGRAM(s) Oral two times a day  cefuroxime  IVPB 1500 milliGRAM(s) IV Intermittent once  cephalexin 250 milliGRAM(s) Oral daily  cilostazol 100 milliGRAM(s) Oral two times a day  fluticasone furoate/umeclidinium/vilanterol 100-62.5-25 MICROgram(s) Inhaler 1 Puff(s) Inhalation daily  gabapentin 300 milliGRAM(s) Oral two times a day  heparin   Injectable 5000 Unit(s) SubCutaneous every 12 hours  metoprolol tartrate 12.5 milliGRAM(s) Oral two times a day  nicotine - 21 mG/24Hr(s) Patch 1 patch Transdermal daily  pantoprazole    Tablet 40 milliGRAM(s) Oral before breakfast  polyethylene glycol 3350 17 Gram(s) Oral two times a day  senna 2 Tablet(s) Oral at bedtime  sodium chloride 1 Gram(s) Oral two times a day    MEDICATIONS  (PRN):  acetaminophen   Tablet .. 650 milliGRAM(s) Oral every 6 hours PRN Mild Pain (1 - 3), Moderate Pain (4 - 6)  ALBUTerol    90 MICROgram(s) HFA Inhaler 2 Puff(s) Inhalation every 6 hours PRN Shortness of Breath  melatonin 3 milliGRAM(s) Oral at bedtime PRN Sleep      LABS:                        10.2   8.75  )-----------( 188      ( 15 Jul 2021 04:28 )             30.6     07-15    137  |  107  |  13  ----------------------------<  91  4.7   |  19<L>  |  1.21    Ca    9.3      15 Jul 2021 04:28  Phos  3.7     07-14  Mg     2.2     07-14      PT/INR - ( 15 Jul 2021 04:28 )   PT: 11.8 sec;   INR: 0.98 ratio         PTT - ( 15 Jul 2021 04:28 )  PTT:32.8 sec  Urinalysis Basic - ( 2021 18:35 )    Color: Light Yellow / Appearance: Clear / S.010 / pH: x  Gluc: x / Ketone: Negative  / Bili: Negative / Urobili: Negative   Blood: x / Protein: Negative / Nitrite: Negative   Leuk Esterase: Negative / RBC: x / WBC x   Sq Epi: x / Non Sq Epi: x / Bacteria: x            MICROBIOLOGY:     RADIOLOGY:  [ ] Reviewed and interpreted by me    Point of Care Ultrasound Findings:    PFT:    EKG:

## 2021-07-15 NOTE — PROGRESS NOTE ADULT - ASSESSMENT
CAD  Triple vessel disease   asa  statin   plan for CABG today    Hypotension   off hctz and BB  stable now     PVD  surgery on hold  plan as above    tobacco   counseling given   nicotine patch     COPD  plan as per pulm     Pericardial effusion   small  appears to be chronic  no clinical evidence of tamponade

## 2021-07-15 NOTE — PROGRESS NOTE ADULT - SUBJECTIVE AND OBJECTIVE BOX
for CABG today       VITAL:  T(C): , Max: 36.7 (21 @ 21:35)  T(F): , Max: 98.1 (21 @ 21:35)  HR: 65 (07-15-21 @ 04:24)  BP: 109/66 (07-15-21 @ 04:24)  BP(mean): --  RR: 18 (07-15-21 @ 04:24)  SpO2: 96% (07-15-21 @ 04:24)  Wt(kg): --      PHYSICAL EXAM:  General: Alerted, oriented  HEENT: MMM  Lungs:CTA-b/l  Heart: RRR S1S2  Abdomen: Soft, NTND  Ext: no pedal edema b/l  : no moore      LABS:                        10.2   8.75  )-----------( 188      ( 15 Jul 2021 04:28 )             30.6     Na(137)/K(4.7)/Cl(107)/HCO3(19)/BUN(13)/Cr(1.21)Glu(91)/Ca(9.3)/Mg(--)/PO4(--)    07-15 @ 04:28  Na(135)/K(4.4)/Cl(104)/HCO3(18)/BUN(14)/Cr(1.25)Glu(108)/Ca(9.8)/Mg(2.2)/PO4(3.7)     @ 20:48  Na(134)/K(3.9)/Cl(103)/HCO3(19)/BUN(19)/Cr(1.30)Glu(96)/Ca(9.3)/Mg(--)/PO4(--)     @ 05:42    Urinalysis Basic - ( 2021 18:35 )    Color: Light Yellow / Appearance: Clear / S.010 / pH: x  Gluc: x / Ketone: Negative  / Bili: Negative / Urobili: Negative   Blood: x / Protein: Negative / Nitrite: Negative   Leuk Esterase: Negative / RBC: x / WBC x   Sq Epi: x / Non Sq Epi: x / Bacteria: x        75y/o  female with COPD , HTN and PVD recent stress test that was abnormal on 21 and presented  for cardiac work now undergoing up CABG eval as well as peripheral bypass surgery work up    Hyponatremia(mild now)- secondary to telmisartan/HCTZ use - now discontinued,  , TSH normal, cortisol low   Non oliguric ZAC -  renal fxn resolving  Ongoing CABG work up -likely being planned for today     RECOMMEND:  Fluid restriction uptil 1. 2 L   c/w  salt tablets 1 gm BID   Monitor Cr for now   No objection to CABG from renal standpoint       Lyric Michel MD  [facility]  Office: [phone]

## 2021-07-15 NOTE — BRIEF OPERATIVE NOTE - NSICDXBRIEFPROCEDURE_GEN_ALL_CORE_FT
PROCEDURES:  CABG, with EMILY 15-Jul-2021 13:03:22  Percy Saldana  CABG, using radial artery graft 15-Jul-2021 13:03:50  Percy Saldana  Howe of radial artery 15-Jul-2021 13:04:09 Left Percy Saldana

## 2021-07-15 NOTE — PROGRESS NOTE ADULT - ASSESSMENT
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 Pulmonology consulted for possible COPD. Pt now on 2L O2 but may not be needed, not currently in exacerbation.     Problems:  COPD        Recommendation  - Continue trelegy 1 puff daily which pt has at bedside, please have RN bring down to pharmacy so can document in EMR that pt is actually taking  - Albuterol nebulizer q6h PRN  - Maintain O2 saturation > 88%, if she does not desaturate with ambulation then no need for O2/ acapella/incentive spirometry  *****************  7/13-mucusy in am--surgery 7/15                           (see below)  7/14-better today  7/15-ready for surgery-no new resp sx.

## 2021-07-15 NOTE — PROGRESS NOTE ADULT - SUBJECTIVE AND OBJECTIVE BOX
Patient is a 76y old  Female who presents with a chief complaint of C2L (15 Jul 2021 19:21)    Coverage for Dr. Michel Strickland   SUBJECTIVE / OVERNIGHT EVENTS: s/p CABG Intubated  Review of Systems  unobtainable     MEDICATIONS  (STANDING):  albumin human  5% IVPB 250 milliLiter(s) IV Intermittent every 30 minutes  albuterol/ipratropium for Nebulization 3 milliLiter(s) Nebulizer every 8 hours  aspirin Suppository 300 milliGRAM(s) Rectal once  buDESOnide    Inhalation Suspension 0.5 milliGRAM(s) Inhalation every 12 hours  cefuroxime  IVPB 1500 milliGRAM(s) IV Intermittent every 8 hours  chlorhexidine 0.12% Liquid 15 milliLiter(s) Oral Mucosa every 12 hours  chlorhexidine 2% Cloths 1 Application(s) Topical <User Schedule>  insulin regular Infusion 3 Unit(s)/Hr (3 mL/Hr) IV Continuous <Continuous>  norepinephrine Infusion 0.05 MICROgram(s)/kG/Min (4.85 mL/Hr) IV Continuous <Continuous>  pantoprazole  Injectable 40 milliGRAM(s) IV Push daily  polyethylene glycol 3350 17 Gram(s) Oral daily  sodium chloride 0.9%. 1000 milliLiter(s) (10 mL/Hr) IV Continuous <Continuous>  vasopressin Infusion 0.017 Unit(s)/Min (1 mL/Hr) IV Continuous <Continuous>    MEDICATIONS  (PRN):      Vital Signs Last 24 Hrs  T(C): 36.2 (15 Jul 2021 20:00), Max: 36.7 (14 Jul 2021 21:35)  T(F): 97.2 (15 Jul 2021 20:00), Max: 98.1 (14 Jul 2021 21:35)  HR: 77 (15 Jul 2021 21:00) (59 - 96)  BP: 109/66 (15 Jul 2021 04:24) (109/66 - 128/75)  BP(mean): --  RR: 12 (15 Jul 2021 21:00) (0 - 37)  SpO2: 99% (15 Jul 2021 21:00) (90% - 100%)    PHYSICAL EXAM:  GENERAL: intubated  HEAD:  Atraumatic, Normocephalic  EYES: EOMI, PERRLA, conjunctiva and sclera clear  NECK: Supple, No JVD  CHEST/LUNG: Clear to auscultation bilaterally; No wheeze Sternal wound clean dressing CTX2  HEART: Regular rate and rhythm; No murmurs, rubs, or gallops  ABDOMEN: Soft, Nontender, Nondistended; Bowel sounds present  EXTREMITIES:  2+ Peripheral Pulses, No clubbing, cyanosis, or edema  NEUROLOGY: non-focal  SKIN: No rashes or lesions    LABS:                        8.7    19.24 )-----------( 127      ( 15 Jul 2021 13:14 )             26.4     07-15    141  |  107  |  11  ----------------------------<  130<H>  4.4   |  18<L>  |  0.78    Ca    8.9      15 Jul 2021 13:14  Phos  8.1     07-15  Mg     2.9     07-15    TPro  4.8<L>  /  Alb  3.3  /  TBili  0.8  /  DBili  x   /  AST  17  /  ALT  12  /  AlkPhos  43  07-15    PT/INR - ( 15 Jul 2021 13:14 )   PT: 13.4 sec;   INR: 1.12 ratio         PTT - ( 15 Jul 2021 13:14 )  PTT:63.1 sec  CARDIAC MARKERS ( 15 Jul 2021 13:14 )  x     / x     / 101 U/L / x     / 8.1 ng/mL            RADIOLOGY & ADDITIONAL TESTS:    Imaging Personally Reviewed:    Consultant(s) Notes Reviewed:      Care Discussed with Consultants/Other Providers:

## 2021-07-15 NOTE — PROGRESS NOTE ADULT - ASSESSMENT
pt  is a 77y/o  female with PMHX of Current tobacco smoker on Nicotine patch , COPD , HTN and PVD.   Pt had recent stress test that was abnormal on 6/22/21  s/p cabg X2  Postop care  Vent support. Wean as tolerated.     Pt planned for Peripheral bypass surgery]\  hyponatremia likely sec to hctz  improved  renal f/u  copd  pulm f/u noted  c/w outpt meds  dvt proph   gi proph   CTU care    Jovani Boyce MD pager [phone]

## 2021-07-15 NOTE — PROGRESS NOTE ADULT - ATTENDING COMMENTS
as above: stable for OHS today  Multifactorial dyspnea--COPD-CB/emphysema (pt was seen by Dr. Montelongo in the office on 7/8   PFT showed a normal FEV1, TLC, DLCO of 68), CAD, debility--keep sat above 90%  COPD/CB/emphysejma--trelegy 1 q day, incentive spirometry, acapella, albuterol q 6  nicotine addiction-- smoking cessation advised.  lung CA screening---LDCT was to be done, will need f/up ct in 6 months  CAD for OHS 7/15         preop--no absolute pulm contras to OHS at this time though pt at risk for PNA/bronchitis etc.  DVT and GI prophylaxis     renal f/up    Isrrael Klein MD-Pulmonary   [phone]

## 2021-07-16 DIAGNOSIS — Z95.1 PRESENCE OF AORTOCORONARY BYPASS GRAFT: ICD-10-CM

## 2021-07-16 DIAGNOSIS — R09.02 HYPOXEMIA: ICD-10-CM

## 2021-07-16 LAB
ALBUMIN SERPL ELPH-MCNC: 4.1 G/DL — SIGNIFICANT CHANGE UP (ref 3.3–5)
ALP SERPL-CCNC: 36 U/L — LOW (ref 40–120)
ALT FLD-CCNC: 14 U/L — SIGNIFICANT CHANGE UP (ref 10–45)
ANION GAP SERPL CALC-SCNC: 13 MMOL/L — SIGNIFICANT CHANGE UP (ref 5–17)
AST SERPL-CCNC: 20 U/L — SIGNIFICANT CHANGE UP (ref 10–40)
BILIRUB SERPL-MCNC: 2 MG/DL — HIGH (ref 0.2–1.2)
BUN SERPL-MCNC: 16 MG/DL — SIGNIFICANT CHANGE UP (ref 7–23)
CALCIUM SERPL-MCNC: 9.2 MG/DL — SIGNIFICANT CHANGE UP (ref 8.4–10.5)
CHLORIDE SERPL-SCNC: 106 MMOL/L — SIGNIFICANT CHANGE UP (ref 96–108)
CO2 SERPL-SCNC: 20 MMOL/L — LOW (ref 22–31)
CREAT SERPL-MCNC: 1.1 MG/DL — SIGNIFICANT CHANGE UP (ref 0.5–1.3)
GAS PNL BLDA: SIGNIFICANT CHANGE UP
GLUCOSE BLDC GLUCOMTR-MCNC: 103 MG/DL — HIGH (ref 70–99)
GLUCOSE BLDC GLUCOMTR-MCNC: 104 MG/DL — HIGH (ref 70–99)
GLUCOSE BLDC GLUCOMTR-MCNC: 108 MG/DL — HIGH (ref 70–99)
GLUCOSE BLDC GLUCOMTR-MCNC: 111 MG/DL — HIGH (ref 70–99)
GLUCOSE BLDC GLUCOMTR-MCNC: 112 MG/DL — HIGH (ref 70–99)
GLUCOSE BLDC GLUCOMTR-MCNC: 120 MG/DL — HIGH (ref 70–99)
GLUCOSE BLDC GLUCOMTR-MCNC: 121 MG/DL — HIGH (ref 70–99)
GLUCOSE BLDC GLUCOMTR-MCNC: 122 MG/DL — HIGH (ref 70–99)
GLUCOSE BLDC GLUCOMTR-MCNC: 122 MG/DL — HIGH (ref 70–99)
GLUCOSE BLDC GLUCOMTR-MCNC: 147 MG/DL — HIGH (ref 70–99)
GLUCOSE BLDC GLUCOMTR-MCNC: 83 MG/DL — SIGNIFICANT CHANGE UP (ref 70–99)
GLUCOSE SERPL-MCNC: 92 MG/DL — SIGNIFICANT CHANGE UP (ref 70–99)
HCT VFR BLD CALC: 21.7 % — LOW (ref 34.5–45)
HCT VFR BLD CALC: 32.1 % — LOW (ref 34.5–45)
HGB BLD-MCNC: 10.7 G/DL — LOW (ref 11.5–15.5)
HGB BLD-MCNC: 7.2 G/DL — LOW (ref 11.5–15.5)
MAGNESIUM SERPL-MCNC: 2.7 MG/DL — HIGH (ref 1.6–2.6)
MCHC RBC-ENTMCNC: 30.5 PG — SIGNIFICANT CHANGE UP (ref 27–34)
MCHC RBC-ENTMCNC: 31.2 PG — SIGNIFICANT CHANGE UP (ref 27–34)
MCHC RBC-ENTMCNC: 33.2 GM/DL — SIGNIFICANT CHANGE UP (ref 32–36)
MCHC RBC-ENTMCNC: 33.3 GM/DL — SIGNIFICANT CHANGE UP (ref 32–36)
MCV RBC AUTO: 91.5 FL — SIGNIFICANT CHANGE UP (ref 80–100)
MCV RBC AUTO: 93.9 FL — SIGNIFICANT CHANGE UP (ref 80–100)
NRBC # BLD: 0 /100 WBCS — SIGNIFICANT CHANGE UP (ref 0–0)
NRBC # BLD: 0 /100 WBCS — SIGNIFICANT CHANGE UP (ref 0–0)
PHOSPHATE SERPL-MCNC: 2.7 MG/DL — SIGNIFICANT CHANGE UP (ref 2.5–4.5)
PLATELET # BLD AUTO: 110 K/UL — LOW (ref 150–400)
PLATELET # BLD AUTO: 113 K/UL — LOW (ref 150–400)
POTASSIUM SERPL-MCNC: 4.1 MMOL/L — SIGNIFICANT CHANGE UP (ref 3.5–5.3)
POTASSIUM SERPL-SCNC: 4.1 MMOL/L — SIGNIFICANT CHANGE UP (ref 3.5–5.3)
PROT SERPL-MCNC: 5.3 G/DL — LOW (ref 6–8.3)
RBC # BLD: 2.31 M/UL — LOW (ref 3.8–5.2)
RBC # BLD: 3.51 M/UL — LOW (ref 3.8–5.2)
RBC # FLD: 14.7 % — HIGH (ref 10.3–14.5)
RBC # FLD: 15.3 % — HIGH (ref 10.3–14.5)
SODIUM SERPL-SCNC: 139 MMOL/L — SIGNIFICANT CHANGE UP (ref 135–145)
WBC # BLD: 13.34 K/UL — HIGH (ref 3.8–10.5)
WBC # BLD: 16.83 K/UL — HIGH (ref 3.8–10.5)
WBC # FLD AUTO: 13.34 K/UL — HIGH (ref 3.8–10.5)
WBC # FLD AUTO: 16.83 K/UL — HIGH (ref 3.8–10.5)

## 2021-07-16 PROCEDURE — 71045 X-RAY EXAM CHEST 1 VIEW: CPT | Mod: 26

## 2021-07-16 PROCEDURE — 99291 CRITICAL CARE FIRST HOUR: CPT | Mod: 24

## 2021-07-16 PROCEDURE — 71045 X-RAY EXAM CHEST 1 VIEW: CPT | Mod: 26,77

## 2021-07-16 PROCEDURE — 93010 ELECTROCARDIOGRAM REPORT: CPT

## 2021-07-16 PROCEDURE — 99232 SBSQ HOSP IP/OBS MODERATE 35: CPT

## 2021-07-16 RX ORDER — DILTIAZEM HCL 120 MG
30 CAPSULE, EXT RELEASE 24 HR ORAL EVERY 6 HOURS
Refills: 0 | Status: DISCONTINUED | OUTPATIENT
Start: 2021-07-16 | End: 2021-07-22

## 2021-07-16 RX ORDER — OXYCODONE AND ACETAMINOPHEN 5; 325 MG/1; MG/1
2 TABLET ORAL EVERY 6 HOURS
Refills: 0 | Status: DISCONTINUED | OUTPATIENT
Start: 2021-07-16 | End: 2021-07-22

## 2021-07-16 RX ORDER — ONDANSETRON 8 MG/1
4 TABLET, FILM COATED ORAL ONCE
Refills: 0 | Status: COMPLETED | OUTPATIENT
Start: 2021-07-16 | End: 2021-07-16

## 2021-07-16 RX ORDER — ACETAMINOPHEN 500 MG
1000 TABLET ORAL ONCE
Refills: 0 | Status: COMPLETED | OUTPATIENT
Start: 2021-07-16 | End: 2021-07-16

## 2021-07-16 RX ORDER — NICOTINE POLACRILEX 2 MG
1 GUM BUCCAL DAILY
Refills: 0 | Status: DISCONTINUED | OUTPATIENT
Start: 2021-07-16 | End: 2021-07-22

## 2021-07-16 RX ORDER — GABAPENTIN 400 MG/1
300 CAPSULE ORAL EVERY 12 HOURS
Refills: 0 | Status: DISCONTINUED | OUTPATIENT
Start: 2021-07-16 | End: 2021-07-22

## 2021-07-16 RX ORDER — TAMSULOSIN HYDROCHLORIDE 0.4 MG/1
0.4 CAPSULE ORAL AT BEDTIME
Refills: 0 | Status: DISCONTINUED | OUTPATIENT
Start: 2021-07-16 | End: 2021-07-22

## 2021-07-16 RX ORDER — LANOLIN ALCOHOL/MO/W.PET/CERES
5 CREAM (GRAM) TOPICAL ONCE
Refills: 0 | Status: COMPLETED | OUTPATIENT
Start: 2021-07-16 | End: 2021-07-16

## 2021-07-16 RX ORDER — INSULIN LISPRO 100/ML
VIAL (ML) SUBCUTANEOUS
Refills: 0 | Status: DISCONTINUED | OUTPATIENT
Start: 2021-07-16 | End: 2021-07-19

## 2021-07-16 RX ORDER — PANTOPRAZOLE SODIUM 20 MG/1
40 TABLET, DELAYED RELEASE ORAL
Refills: 0 | Status: DISCONTINUED | OUTPATIENT
Start: 2021-07-16 | End: 2021-07-16

## 2021-07-16 RX ORDER — POTASSIUM CHLORIDE 20 MEQ
10 PACKET (EA) ORAL ONCE
Refills: 0 | Status: COMPLETED | OUTPATIENT
Start: 2021-07-16 | End: 2021-07-16

## 2021-07-16 RX ORDER — HYDROMORPHONE HYDROCHLORIDE 2 MG/ML
0.5 INJECTION INTRAMUSCULAR; INTRAVENOUS; SUBCUTANEOUS ONCE
Refills: 0 | Status: DISCONTINUED | OUTPATIENT
Start: 2021-07-16 | End: 2021-07-16

## 2021-07-16 RX ORDER — FLUTICASONE FUROATE, UMECLIDINIUM BROMIDE AND VILANTEROL TRIFENATATE 200; 62.5; 25 UG/1; UG/1; UG/1
1 POWDER RESPIRATORY (INHALATION) DAILY
Refills: 0 | Status: DISCONTINUED | OUTPATIENT
Start: 2021-07-16 | End: 2021-07-22

## 2021-07-16 RX ORDER — OXYCODONE AND ACETAMINOPHEN 5; 325 MG/1; MG/1
1 TABLET ORAL EVERY 6 HOURS
Refills: 0 | Status: DISCONTINUED | OUTPATIENT
Start: 2021-07-16 | End: 2021-07-22

## 2021-07-16 RX ORDER — FAMOTIDINE 10 MG/ML
20 INJECTION INTRAVENOUS
Refills: 0 | Status: DISCONTINUED | OUTPATIENT
Start: 2021-07-16 | End: 2021-07-21

## 2021-07-16 RX ORDER — INSULIN LISPRO 100/ML
VIAL (ML) SUBCUTANEOUS AT BEDTIME
Refills: 0 | Status: DISCONTINUED | OUTPATIENT
Start: 2021-07-16 | End: 2021-07-19

## 2021-07-16 RX ORDER — ENOXAPARIN SODIUM 100 MG/ML
40 INJECTION SUBCUTANEOUS DAILY
Refills: 0 | Status: DISCONTINUED | OUTPATIENT
Start: 2021-07-16 | End: 2021-07-22

## 2021-07-16 RX ADMIN — Medication 5 MILLIGRAM(S): at 21:56

## 2021-07-16 RX ADMIN — POLYETHYLENE GLYCOL 3350 17 GRAM(S): 17 POWDER, FOR SOLUTION ORAL at 11:20

## 2021-07-16 RX ADMIN — HYDROMORPHONE HYDROCHLORIDE 0.5 MILLIGRAM(S): 2 INJECTION INTRAMUSCULAR; INTRAVENOUS; SUBCUTANEOUS at 06:10

## 2021-07-16 RX ADMIN — ONDANSETRON 4 MILLIGRAM(S): 8 TABLET, FILM COATED ORAL at 08:45

## 2021-07-16 RX ADMIN — Medication 1 PATCH: at 11:19

## 2021-07-16 RX ADMIN — Medication 100 MILLIGRAM(S): at 07:33

## 2021-07-16 RX ADMIN — Medication 30 MILLIGRAM(S): at 12:56

## 2021-07-16 RX ADMIN — Medication 400 MILLIGRAM(S): at 02:30

## 2021-07-16 RX ADMIN — Medication 3 MILLILITER(S): at 15:37

## 2021-07-16 RX ADMIN — OXYCODONE AND ACETAMINOPHEN 2 TABLET(S): 5; 325 TABLET ORAL at 20:34

## 2021-07-16 RX ADMIN — Medication 0.5 MILLIGRAM(S): at 05:12

## 2021-07-16 RX ADMIN — FLUTICASONE FUROATE, UMECLIDINIUM BROMIDE AND VILANTEROL TRIFENATATE 1 PUFF(S): 200; 62.5; 25 POWDER RESPIRATORY (INHALATION) at 15:42

## 2021-07-16 RX ADMIN — Medication 50 MILLIEQUIVALENT(S): at 02:06

## 2021-07-16 RX ADMIN — Medication 30 MILLIGRAM(S): at 08:29

## 2021-07-16 RX ADMIN — CHLORHEXIDINE GLUCONATE 1 APPLICATION(S): 213 SOLUTION TOPICAL at 06:11

## 2021-07-16 RX ADMIN — ONDANSETRON 4 MILLIGRAM(S): 8 TABLET, FILM COATED ORAL at 06:10

## 2021-07-16 RX ADMIN — GABAPENTIN 300 MILLIGRAM(S): 400 CAPSULE ORAL at 08:29

## 2021-07-16 RX ADMIN — TAMSULOSIN HYDROCHLORIDE 0.4 MILLIGRAM(S): 0.4 CAPSULE ORAL at 21:56

## 2021-07-16 RX ADMIN — ENOXAPARIN SODIUM 40 MILLIGRAM(S): 100 INJECTION SUBCUTANEOUS at 11:20

## 2021-07-16 RX ADMIN — HYDROMORPHONE HYDROCHLORIDE 0.5 MILLIGRAM(S): 2 INJECTION INTRAMUSCULAR; INTRAVENOUS; SUBCUTANEOUS at 06:25

## 2021-07-16 RX ADMIN — Medication 1000 MILLIGRAM(S): at 03:00

## 2021-07-16 RX ADMIN — ATORVASTATIN CALCIUM 40 MILLIGRAM(S): 80 TABLET, FILM COATED ORAL at 21:57

## 2021-07-16 RX ADMIN — Medication 3 MILLILITER(S): at 05:12

## 2021-07-16 RX ADMIN — Medication 81 MILLIGRAM(S): at 11:20

## 2021-07-16 RX ADMIN — Medication 400 MILLIGRAM(S): at 12:55

## 2021-07-16 RX ADMIN — OXYCODONE AND ACETAMINOPHEN 2 TABLET(S): 5; 325 TABLET ORAL at 21:30

## 2021-07-16 RX ADMIN — Medication 30 MILLIGRAM(S): at 20:34

## 2021-07-16 RX ADMIN — Medication 1000 MILLIGRAM(S): at 13:00

## 2021-07-16 RX ADMIN — PANTOPRAZOLE SODIUM 40 MILLIGRAM(S): 20 TABLET, DELAYED RELEASE ORAL at 07:33

## 2021-07-16 RX ADMIN — GABAPENTIN 300 MILLIGRAM(S): 400 CAPSULE ORAL at 17:05

## 2021-07-16 RX ADMIN — Medication 1 PATCH: at 18:03

## 2021-07-16 RX ADMIN — Medication 100 MILLIGRAM(S): at 15:37

## 2021-07-16 RX ADMIN — Medication 3 MILLILITER(S): at 21:56

## 2021-07-16 RX ADMIN — FAMOTIDINE 20 MILLIGRAM(S): 10 INJECTION INTRAVENOUS at 17:05

## 2021-07-16 NOTE — PROGRESS NOTE ADULT - ASSESSMENT
pt  is a 75y/o  female with PMHX of Current tobacco smoker on Nicotine patch , COPD , HTN and PVD.   Pt had recent stress test that was abnormal on 6/22/21    s/p cabg X2  Postop care  hyponatremia likely sec to hctz  improved  renal f/u  copd  pulm f/u noted  c/w outpt meds  dvt proph   gi proph     Jovani Boyce MD pager [phone]

## 2021-07-16 NOTE — DIETITIAN INITIAL EVALUATION ADULT. - OTHER INFO
GASTROINTESTINAL:  Last BM: none noted  Bowel Regimen: none  Nausea 7/16 (POD1)    NUTRITION STATUS:  - BG well controlled with SSI (pre-meal/bedtime)  - IVF: NS @ 10 ml/hr    WEIGHT HISTORY: Unavailable    EDUCATION: deferred in setting of nausea; RD will reattempt

## 2021-07-16 NOTE — DIETITIAN INITIAL EVALUATION ADULT. - ORAL INTAKE PTA/DIET HISTORY
Diet History: Unable to assess  No chewing/swallowing difficulty reported.   Allergies: NKFA  Home Nutrition Supplements: none noted Diet History: Unable to assess; RD will follow up as able  No chewing/swallowing difficulty reported.   Allergies: NKFA  Home Nutrition Supplements: none noted

## 2021-07-16 NOTE — DIETITIAN INITIAL EVALUATION ADULT. - PERTINENT LABORATORY DATA
07-16 @ 00:22: Sodium 139, Potassium 4.1, Calcium 9.2, Magnesium 2.7<H>, Phosphorus 2.7, BUN 16, Creatinine 1.10, Glucose 92  07-15 @ 13:14: Sodium 141, Potassium 4.4, Calcium 8.9, Magnesium 2.9<H>, Phosphorus 8.1<H>, BUN 11, Creatinine 0.78, Glucose 130<H>  Hemoglobin : 10.7 g/dL  Hematocrit : 32.1 %    A1C with Estimated Average Glucose Result: 5.4 % (07-14-21 @ 19:13)    POCT Blood Glucose.: 120 mg/dL (07-16-21 @ 07:32)  POCT Blood Glucose.: 112 mg/dL (07-16-21 @ 06:44)  POCT Blood Glucose.: 83 mg/dL (07-16-21 @ 05:09)  POCT Blood Glucose.: 108 mg/dL (07-16-21 @ 04:10)  POCT Blood Glucose.: 122 mg/dL (07-16-21 @ 02:57)  POCT Blood Glucose.: 111 mg/dL (07-16-21 @ 01:55)  POCT Blood Glucose.: 104 mg/dL (07-16-21 @ 01:05)  POCT Blood Glucose.: 103 mg/dL (07-16-21 @ 00:05)  POCT Blood Glucose.: 108 mg/dL (07-15-21 @ 22:59)  POCT Blood Glucose.: 132 mg/dL (07-15-21 @ 21:54)  POCT Blood Glucose.: 144 mg/dL (07-15-21 @ 20:58)  POCT Blood Glucose.: 137 mg/dL (07-15-21 @ 20:12)  POCT Blood Glucose.: 94 mg/dL (07-15-21 @ 19:01)  POCT Blood Glucose.: 69 mg/dL (07-15-21 @ 18:59)  POCT Blood Glucose.: 106 mg/dL (07-15-21 @ 17:58)  POCT Blood Glucose.: 137 mg/dL (07-15-21 @ 17:05)  POCT Blood Glucose.: 163 mg/dL (07-15-21 @ 15:43)  POCT Blood Glucose.: 157 mg/dL (07-15-21 @ 15:07)  POCT Blood Glucose.: 183 mg/dL (07-15-21 @ 14:35)     LIVER FUNCTIONS - ( 16 Jul 2021 00:22 )  Alb: 4.1 g/dL / Pro: 5.3 g/dL / ALK PHOS: 36 U/L / ALT: 14 U/L / AST: 20 U/L / GGT: x           Triglycerides, Serum: 36 mg/dL (07-10-21 @ 09:05)

## 2021-07-16 NOTE — PROGRESS NOTE ADULT - PROBLEM SELECTOR PLAN 1
Asa, Statin, Chest PT,  Incentive spirometry, wound care and assessment.  Ambulate   cardizem-radial graft

## 2021-07-16 NOTE — PROGRESS NOTE ADULT - ASSESSMENT
76 year old female current everyday 3ppd smoker on nicotine patch Hx COPD, PVD, CAD presents [facility] for LHC, as preop testing prior to PAD procedure. S/p LHC revealing TVD, CT surgery consulted for CABG eval.     7/9/21 CT Surgery evaluation in progress. Preoperative work up in progress. Dr. Cole to review angiogram findings.   7/15/21  S/P  C2L w/ L radial harvest,ef 45  Acute blood loss anemia- p0rbc- basleline hct elevated from smoking hx- 2u intraop and post op  SB- pacing- now nsr  Hypoxia- copd  Cardizem for radial artery graft  Transfered to sdu -6lnco2, no distress  [id number] o2 sat

## 2021-07-16 NOTE — PROGRESS NOTE ADULT - SUBJECTIVE AND OBJECTIVE BOX
s/p CABG  off  pressors  On chair, alert,       VITAL:  T(C): , Max: 36.6 (07-16-21 @ 08:00)  T(F): , Max: 97.9 (07-16-21 @ 08:00)  HR: 84 (07-16-21 @ 09:00)  BP: 137/69 (07-16-21 @ 09:00)  BP(mean): 96 (07-16-21 @ 09:00)  RR: 18 (07-16-21 @ 09:00)  SpO2: 92% (07-16-21 @ 09:00)  Wt(kg): --      PHYSICAL EXAM:  General: Alert  HEENT: MMM  Lungs:Chest tube   Heart: RRR S1S2  Abdomen: Soft, NTND  Ext: no pedal edema b/l  : moore      LABS:                        10.7   16.83 )-----------( 110      ( 16 Jul 2021 06:27 )             32.1     Na(139)/K(4.1)/Cl(106)/HCO3(20)/BUN(16)/Cr(1.10)Glu(92)/Ca(9.2)/Mg(2.7)/PO4(2.7)    07-16 @ 00:22  Na(141)/K(4.4)/Cl(107)/HCO3(18)/BUN(11)/Cr(0.78)Glu(130)/Ca(8.9)/Mg(2.9)/PO4(8.1)    07-15 @ 13:14  Na(137)/K(4.7)/Cl(107)/HCO3(19)/BUN(13)/Cr(1.21)Glu(91)/Ca(9.3)/Mg(--)/PO4(--)    07-15 @ 04:28  Na(135)/K(4.4)/Cl(104)/HCO3(18)/BUN(14)/Cr(1.25)Glu(108)/Ca(9.8)/Mg(2.2)/PO4(3.7)    07-14 @ 20:48          77y/o  female with COPD , HTN and PVD recent stress test that was abnormal on 6/22/21 now s/p CABG on 7/15     Hyponatremia(mild now)- secondary to telmisartan/HCTZ use - now discontinued,  , TSH normal, cortisol low   Non oliguric ZAC -  renal fxn resolving  s/p CABG on 7/15    RECOMMEND:  No indication  for salt tablets for now    Monitor Cr/NA  for now   Chest tube care per CT ICU         Lyric Michel MD  [facility]  Office: [phone]         s/p CABG  off  pressors  On chair, alert,       VITAL:  T(C): , Max: 36.6 (07-16-21 @ 08:00)  T(F): , Max: 97.9 (07-16-21 @ 08:00)  HR: 84 (07-16-21 @ 09:00)  BP: 137/69 (07-16-21 @ 09:00)  BP(mean): 96 (07-16-21 @ 09:00)  RR: 18 (07-16-21 @ 09:00)  SpO2: 92% (07-16-21 @ 09:00)  Wt(kg): --      PHYSICAL EXAM:  General: Alert  HEENT: MMM  Lungs:Chest tube   Heart: RRR S1S2  Abdomen: Soft, NTND  Ext: no pedal edema b/l  : moore      LABS:                        10.7   16.83 )-----------( 110      ( 16 Jul 2021 06:27 )             32.1     Na(139)/K(4.1)/Cl(106)/HCO3(20)/BUN(16)/Cr(1.10)Glu(92)/Ca(9.2)/Mg(2.7)/PO4(2.7)    07-16 @ 00:22  Na(141)/K(4.4)/Cl(107)/HCO3(18)/BUN(11)/Cr(0.78)Glu(130)/Ca(8.9)/Mg(2.9)/PO4(8.1)    07-15 @ 13:14  Na(137)/K(4.7)/Cl(107)/HCO3(19)/BUN(13)/Cr(1.21)Glu(91)/Ca(9.3)/Mg(--)/PO4(--)    07-15 @ 04:28  Na(135)/K(4.4)/Cl(104)/HCO3(18)/BUN(14)/Cr(1.25)Glu(108)/Ca(9.8)/Mg(2.2)/PO4(3.7)    07-14 @ 20:48          75y/o  female with COPD , HTN and PVD recent stress test that was abnormal on 6/22/21 now s/p CABG on 7/15     Hyponatremia(mild now)- secondary to telmisartan/HCTZ use - now discontinued,  , TSH normal, cortisol low   Non oliguric ZAC -  renal fxn resolving  s/p CABG on 7/15    RECOMMEND:  No indication  for salt tablets for now    Monitor Cr/NA  for now   Chest tube care per CT ICU   Renal will sign off. Please reconsult with questions         Lyric Michel MD  [facility]  Office: [phone]

## 2021-07-16 NOTE — PROGRESS NOTE ADULT - SUBJECTIVE AND OBJECTIVE BOX
VITAL SIGNS    Telemetry:      Vital Signs Last 24 Hrs  T(C): 36.4 (21 @ 15:08), Max: 36.6 (21 @ 08:00)  T(F): 97.5 (21 @ 15:08), Max: 97.9 (21 @ 08:00)  HR: 77 (21 @ 15:08) (59 - 96)  BP: 143/70 (21 @ 15:08) (120/62 - 155/70)  RR: 18 (21 @ 15:08) (0 - 30)  SpO2: 92% (21 @ 15:08) (88% - 100%)                   07-15 @ 07:01  -   @ 07:00  --------------------------------------------------------  IN: 2218.3 mL / OUT: 1505 mL / NET: 713.3 mL     @ 07:01  -   @ 17:33  --------------------------------------------------------  IN: 510 mL / OUT: 170 mL / NET: 340 mL          Daily     Daily Weight in k.7 (2021 00:00)            CAPILLARY BLOOD GLUCOSE  112 (2021 07:00)  86 (2021 05:00)  108 (2021 04:00)  122 (2021 03:00)  111 (2021 02:00)  104 (2021 01:00)  103 (2021 00:00)  108 (15 Jul 2021 23:00)  132 (15 Jul 2021 22:00)  144 (15 Jul 2021 21:00)  137 (15 Jul 2021 20:00)      POCT Blood Glucose.: 121 mg/dL (2021 16:53)  POCT Blood Glucose.: 122 mg/dL (2021 11:16)  POCT Blood Glucose.: 120 mg/dL (2021 07:32)  POCT Blood Glucose.: 112 mg/dL (2021 06:44)  POCT Blood Glucose.: 83 mg/dL (2021 05:09)  POCT Blood Glucose.: 108 mg/dL (2021 04:10)  POCT Blood Glucose.: 122 mg/dL (2021 02:57)  POCT Blood Glucose.: 111 mg/dL (2021 01:55)  POCT Blood Glucose.: 104 mg/dL (2021 01:05)  POCT Blood Glucose.: 103 mg/dL (2021 00:05)  POCT Blood Glucose.: 108 mg/dL (15 Jul 2021 22:59)  POCT Blood Glucose.: 132 mg/dL (15 Jul 2021 21:54)  POCT Blood Glucose.: 144 mg/dL (15 Jul 2021 20:58)  POCT Blood Glucose.: 137 mg/dL (15 Jul 2021 20:12)  POCT Blood Glucose.: 94 mg/dL (15 Jul 2021 19:01)  POCT Blood Glucose.: 69 mg/dL (15 Jul 2021 18:59)  POCT Blood Glucose.: 106 mg/dL (15 Jul 2021 17:58)            Drains:     MS         [  ] Drainage:                 L Pleural  [  ]  Drainage:                R Pleural  [  ]  Drainage:    Pacing Wires        [  ]   Settings:                                  Isolated  [  ]    Coumadin    [ ] YES          [  ]      NO                                   PHYSICAL EXAM        Neurology: alert and oriented x 3, nonfocal, no gross deficits  CV : s1 s2 RRR  Sternal Wound :  CDI , Stable  Lungs: cta  Abdomen: soft, nontender, nondistended, positive bowel sounds, last bowel movement                       chest tubes  :    voiding / moore - sbd         Extremities:      edema   /  -   calve tenderness ,    L leg  /  R leg  incisions cdi          albuterol/ipratropium for Nebulization 3 milliLiter(s) Nebulizer every 8 hours  aspirin enteric coated 81 milliGRAM(s) Oral daily  atorvastatin 40 milliGRAM(s) Oral at bedtime  cefuroxime  IVPB 1500 milliGRAM(s) IV Intermittent every 8 hours  chlorhexidine 2% Cloths 1 Application(s) Topical <User Schedule>  diltiazem    Tablet 30 milliGRAM(s) Oral every 6 hours  enoxaparin Injectable 40 milliGRAM(s) SubCutaneous daily  famotidine    Tablet 20 milliGRAM(s) Oral two times a day  fluticasone furoate/umeclidinium/vilanterol 100-62.5-25 MICROgram(s) Inhaler 1 Puff(s) Inhalation daily  gabapentin 300 milliGRAM(s) Oral every 12 hours  insulin lispro (ADMELOG) corrective regimen sliding scale   SubCutaneous three times a day before meals  insulin lispro (ADMELOG) corrective regimen sliding scale   SubCutaneous at bedtime  nicotine - 21 mG/24Hr(s) Patch 1 patch Transdermal daily  polyethylene glycol 3350 17 Gram(s) Oral daily  sodium chloride 0.9%. 1000 milliLiter(s) IV Continuous <Continuous>  tamsulosin 0.4 milliGRAM(s) Oral at bedtime                    Physical Therapy Rec:   Home  [  ]   Home w/ PT  [  ]  Rehab  [  ]  Discussed with Cardiothoracic Team at AM rounds.

## 2021-07-16 NOTE — PROGRESS NOTE ADULT - ASSESSMENT
CAD  Triple vessel disease   asa  statin   s/p CABG  off pressers  plan as per CTiCU    PVD  surgery on hold for now given above     tobacco   counseling given   nicotine patch     COPD  plan as per pulm   nebs

## 2021-07-16 NOTE — PROGRESS NOTE ADULT - SUBJECTIVE AND OBJECTIVE BOX
JITENDRA CAAL  MRN-[id number]  Patient is a 76y old  Female who presents with a chief complaint of sob    Per chart: Pt s/p CABG, with EMILY 15-Jul-2021 (16 Jul 2021 08:55)    HPI:  This is a 75y/o  female with no known implantable devices noted COVID 19 negative with Pfizer Vaccine last dose 2/4/21 with PMHX of Current tobacco smoker on Nicotine patch , COPD , HTN and PVD. Pt had recent stress test that was abnormal on 6/22/21 see below  . Presents today for [facility] with Dr Quinton Whelan. Pt planned for Peripheral bypass surgery . No complaints of CP no sob no palpitations noted.     Dr. Guzman ,[facility] Cardiologist   < from: Nuclear Stress Test-Pharmacologic (06.22.21 @ 10:25) >  NUCLEAR FINDINGS:  The left ventricle was small in size.  There are medium-sized, mild defects in the distal  anterior, anteroapical, distal anterolateral, and apical  lateral walls that are reversible suggestive of ischemia.  There are medium-sized, mild to moderate defects in the  anteroseptal andapical septal walls that are mostly fixed  suggestive of infarct with mild crystal-infarct ischemia.  There are medium-sized, mild defects in the inferior and  basal to mid inferoseptal walls that are mostly fixed  suggestive of infarct with minimal crystal-infarct ischemia.  ------------------------------------------------------------------------  GATED ANALYSIS:  Post-stress gated wall motion analysis was performed (LVEF  > 70%;LVEDV = 37 ml.) revealing hypokinesis of the basal  inferior and basal septal walls and reduced systolic  thickening of the mid to distal anteroseptal, mid to  distal inferior, distal anterior, and distal anterolateral  walls.  ------------------------------------------------------------------------  IMPRESSIONS:Abnormal Study  * Chest Pain: No chest pain with administration of  Regadenoson.  * Symptom: Shortness of breath, nausea, cramping.  * HR Response: Appropriate.  * BP Response: Appropriate.  * Heart Rhythm: Sinus Bradycardia - 57 BPM.    < end of copied text >  < from: Nuclear Stress Test-Pharmacologic (06.22.21 @ 10:25) >   Baseline ECG: Nonspecific ST-T wave abnormality.  * ECG Changes: No significant ischemic ST segment changes  beyond baseline abnormalities.  * Arrhythmia: Rare VPDs occurred during stress.  Frequent  VPDs occurred during recovery.  * The left ventricle was small in size.  *There are medium-sized, mild defects in the distal  anterior, anteroapical, distal anterolateral, and apical  lateral walls that are reversible suggestive of ischemia.  * There are medium-sized, mild to moderate defects in the  anteroseptal and apical septal walls that are mostly fixed  suggestive of infarct with mild crystal-infarct ischemia.  * There are medium-sized, mild defects in the inferior and  basal to mid inferoseptal walls that are mostly fixed  suggestive of infarct with minimal crystal-infarct ischemia.  * Post-stress gated wall motion analysis was performed  (LVEF > 70%;LVEDV = 37 ml.) revealing hypokinesis of the  basal inferior and basal septal walls and reduced systolic  thickening of the mid to distal anteroseptal, mid to  distalinferior, distal anterior, and distal anterolateral  walls.  *** No previous Nuclear/Stress exam.  ------------------------------------------------------------------------  Confirmed on  6/22/2021 - 14:16:08 by Bridger Garcia M.D.  ------------------------------------------------------------------------    < end of copied text >   (09 Jul 2021 12:18)      Surgery/Hospital Course:  7/15: C2L w L radial [place] with 2 U pRBCs intra-op    Today:    REVIEW OF SYSTEMS:  Gen: No fever  EYES/ENT: No visual changes;  No vertigo or throat pain   NECK: No pain   RES:  No shortness of breath or Cough  CARD: No chest pain   GI: No abdominal pain  : No dysuria  NEURO: No weakness  SKIN: No itching, rashes     ICU Vital Signs Last 24 Hrs  T(C): 36.6 (16 Jul 2021 08:00), Max: 36.6 (16 Jul 2021 08:00)  T(F): 97.9 (16 Jul 2021 08:00), Max: 97.9 (16 Jul 2021 08:00)  HR: 84 (16 Jul 2021 09:00) (59 - 96)  BP: 137/69 (16 Jul 2021 09:00) (120/62 - 138/66)  BP(mean): 96 (16 Jul 2021 09:00) (85 - 98)  ABP: 113/97 (16 Jul 2021 04:00) (93/46 - 146/58)  ABP(mean): 105 (16 Jul 2021 04:00) (63 - 120)  RR: 18 (16 Jul 2021 09:00) (0 - 37)  SpO2: 92% (16 Jul 2021 09:00) (88% - 100%)      Physical Exam:  Gen:  Awake, alert   CNS: non focal 	  Neck: no JVD  RES : clear , no wheezing  Chest: +Chest tubes              CVS: Regular  rhythm. Normal S1/S2  Abd: Soft, non-distended. Bowel sounds present.  Skin: No rash.  Ext:  no edema    ============================I/O===========================   I&O's Detail    15 Jul 2021 07:01  -  16 Jul 2021 07:00  --------------------------------------------------------  IN:    Albumin 5%  - 250 mL: 1000 mL    Insulin: 33 mL    IV PiggyBack: 350 mL    Norepinephrine: 35.3 mL    PRBCs (Packed Red Blood Cells): 600 mL    sodium chloride 0.9%: 190 mL    Vasopressin: 10 mL  Total IN: 2218.3 mL    OUT:    Chest Tube (mL): 300 mL    Chest Tube (mL): 160 mL    Indwelling Catheter - Urethral (mL): 1045 mL  Total OUT: 1505 mL    Total NET: 713.3 mL      16 Jul 2021 07:01  -  16 Jul 2021 11:36  --------------------------------------------------------  IN:    IV PiggyBack: 100 mL    Oral Fluid: 240 mL    sodium chloride 0.9%: 20 mL  Total IN: 360 mL    OUT:    Chest Tube (mL): 40 mL    Chest Tube (mL): 10 mL    Indwelling Catheter - Urethral (mL): 60 mL  Total OUT: 110 mL    Total NET: 250 mL        ============================ LABS =========================                        10.7   16.83 )-----------( 110      ( 16 Jul 2021 06:27 )             32.1     07-16    139  |  106  |  16  ----------------------------<  92  4.1   |  20<L>  |  1.10    Ca    9.2      16 Jul 2021 00:22  Phos  2.7     07-16  Mg     2.7     07-16    TPro  5.3<L>  /  Alb  4.1  /  TBili  2.0<H>  /  DBili  x   /  AST  20  /  ALT  14  /  AlkPhos  36<L>  07-16    LIVER FUNCTIONS - ( 16 Jul 2021 00:22 )  Alb: 4.1 g/dL / Pro: 5.3 g/dL / ALK PHOS: 36 U/L / ALT: 14 U/L / AST: 20 U/L / GGT: x           PT/INR - ( 15 Jul 2021 13:14 )   PT: 13.4 sec;   INR: 1.12 ratio         PTT - ( 15 Jul 2021 13:14 )  PTT:63.1 sec  ABG - ( 16 Jul 2021 00:08 )  pH, Arterial: 7.39  pH, Blood: x     /  pCO2: 39    /  pO2: 118   / HCO3: 23    / Base Excess: -1.3  /  SaO2: 98                  ======================Micro/Rad/Cardio=================  CXR: Reviewed  Echo:Reviewed  ======================================================  PAST MEDICAL & SURGICAL HISTORY:  HTN (hypertension)    Tobacco use    COPD (chronic obstructive pulmonary disease)    PVD (peripheral vascular disease)    No significant past surgical history      ====================ASSESSMENT ==============  CAD s/p C2L with L radial harvest POD #1  Acute blood loss Anemia s/p 2 U pRBCs  PVD  Leukocytosis   Thrombocytopenia   COPD  HTN      Plan:  ====================== NEUROLOGY=====================  Nonfocal, continue to monitor neuro status per ICU protocol.   C/w gabapentin  gabapentin 300 milliGRAM(s) Oral every 12 hours    ==================== RESPIRATORY======================  Receiving supplemental O2 therapy with CPAP  Continue to monitor RR, breathing pattern, pulse ox, and ABG's.  Encourage incentive spirometry.   C/w bronchodilators for COPD    Mechanical Ventilation:  Mode: CPAP with PS  FiO2: 50  PEEP: 5  PS: 15  MAP: 11  PIP: 20    albuterol/ipratropium for Nebulization 3 milliLiter(s) Nebulizer every 8 hours  buDESOnide    Inhalation Suspension 0.5 milliGRAM(s) Inhalation every 12 hours    ====================CARDIOVASCULAR==================  CAD s/p C2L with L radial harvest POD #1  Pressor support with diltiazem  ASA and Lipitor for graft patency   VVI back up pacing wires     diltiazem    Tablet 30 milliGRAM(s) Oral every 6 hours  atorvastatin 40 milliGRAM(s) Oral at bedtime  aspirin enteric coated 81 milliGRAM(s) Oral daily  ===================HEMATOLOGIC/ONC ===================  Acute blood loss anemia s/p 2 U pRBCs intra-op  Thrombocytopenia ~110  Monitor H&H/Plts  Monitor chest tube outputs   Continue Lovenox for venous thromboembolism prophylaxis.     enoxaparin Injectable 40 milliGRAM(s) SubCutaneous daily  ===================== RENAL =========================  Continue to monitor I/Os, BUN/Creatinine, and urine output.   Goal net negative fluid balance. Replete lytes PRN. Keep K> 4 and Mg >2.   C/w tamsulosin    tamsulosin 0.4 milliGRAM(s) Oral at bedtime  ==================== GASTROINTESTINAL===================  Tolerating PO consistent carb diet.   Continue Pepcid for stress ulcer prophylaxis.   Bowel regimen with Miralax.     famotidine    Tablet 20 milliGRAM(s) Oral two times a day  polyethylene glycol 3350 17 Gram(s) Oral daily  sodium chloride 0.9%. 1000 milliLiter(s) (10 mL/Hr) IV Continuous <Continuous>  =======================    ENDOCRINE  =====================  Stress hyperglycemia requiring Admelog sliding scale  Monitor blood glucose levels.   insulin lispro (ADMELOG) corrective regimen sliding scale   SubCutaneous three times a day before meals  insulin lispro (ADMELOG) corrective regimen sliding scale   SubCutaneous at bedtime    ========================INFECTIOUS DISEASE================  Afebrile, Leukocytosis with WBC at 16.83  Monitor temperature and trend WBC.   Continue Cefuroxime for perioperative antibiotic coverage,   cefuroxime  IVPB 1500 milliGRAM(s) IV Intermittent every 8 hours      Patient requires continuous monitoring with bedside rhythm monitoring, pulse ox monitoring, and intermittent blood gas analysis. Care plan discussed with ICU care team. Patient remained critical and at risk for life threatening decompensation.     By signing my name below, I, Lucila Crawley, attest that this documentation has been prepared under the direction and in the presence of SERGIO Medeiros  Electronically signed: Lucila Crawley Scribe, 07-16-21 @ 11:36    IMarry PA , personally performed the services described in this documentation. all medical record entries made by the scribe were at my direction and in my presence. I have reviewed the chart and agree that the record reflects my personal performance and is accurate and complete  Electronically signed: SERGIO Medeiros        JITENDRA CAAL  MRN-[id number]  Patient is a 76y old  Female who presents with a chief complaint of sob    Per chart: Pt s/p CABG, with EMILY 15-Jul-2021 (16 Jul 2021 08:55)    HPI:  This is a 75y/o  female with no known implantable devices noted COVID 19 negative with Pfizer Vaccine last dose 2/4/21 with PMHX of Current tobacco smoker on Nicotine patch , COPD , HTN and PVD. Pt had recent stress test that was abnormal on 6/22/21 see below  . Presents today for [facility] with Dr Quinton Whelan. Pt planned for Peripheral bypass surgery . No complaints of CP no sob no palpitations noted.     Hospital course:  7/15 C2L w/ L radial harvest   7/16 started on cardizem, stepdown. Left pleural chest tube removed.           Dr. Guzman ,[facility] Cardiologist   < from: Nuclear Stress Test-Pharmacologic (06.22.21 @ 10:25) >  NUCLEAR FINDINGS:  The left ventricle was small in size.  There are medium-sized, mild defects in the distal  anterior, anteroapical, distal anterolateral, and apical  lateral walls that are reversible suggestive of ischemia.  There are medium-sized, mild to moderate defects in the  anteroseptal andapical septal walls that are mostly fixed  suggestive of infarct with mild crystal-infarct ischemia.  There are medium-sized, mild defects in the inferior and  basal to mid inferoseptal walls that are mostly fixed  suggestive of infarct with minimal crystal-infarct ischemia.  ------------------------------------------------------------------------  GATED ANALYSIS:  Post-stress gated wall motion analysis was performed (LVEF  > 70%;LVEDV = 37 ml.) revealing hypokinesis of the basal  inferior and basal septal walls and reduced systolic  thickening of the mid to distal anteroseptal, mid to  distal inferior, distal anterior, and distal anterolateral  walls.  ------------------------------------------------------------------------  IMPRESSIONS:Abnormal Study  * Chest Pain: No chest pain with administration of  Regadenoson.  * Symptom: Shortness of breath, nausea, cramping.  * HR Response: Appropriate.  * BP Response: Appropriate.  * Heart Rhythm: Sinus Bradycardia - 57 BPM.    < end of copied text >  < from: Nuclear Stress Test-Pharmacologic (06.22.21 @ 10:25) >   Baseline ECG: Nonspecific ST-T wave abnormality.  * ECG Changes: No significant ischemic ST segment changes  beyond baseline abnormalities.  * Arrhythmia: Rare VPDs occurred during stress.  Frequent  VPDs occurred during recovery.  * The left ventricle was small in size.  *There are medium-sized, mild defects in the distal  anterior, anteroapical, distal anterolateral, and apical  lateral walls that are reversible suggestive of ischemia.  * There are medium-sized, mild to moderate defects in the  anteroseptal and apical septal walls that are mostly fixed  suggestive of infarct with mild crystal-infarct ischemia.  * There are medium-sized, mild defects in the inferior and  basal to mid inferoseptal walls that are mostly fixed  suggestive of infarct with minimal crystal-infarct ischemia.  * Post-stress gated wall motion analysis was performed  (LVEF > 70%;LVEDV = 37 ml.) revealing hypokinesis of the  basal inferior and basal septal walls and reduced systolic  thickening of the mid to distal anteroseptal, mid to  distalinferior, distal anterior, and distal anterolateral  walls.  *** No previous Nuclear/Stress exam.  ------------------------------------------------------------------------  Confirmed on  6/22/2021 - 14:16:08 by Bridger Garcia M.D.  ------------------------------------------------------------------------    < end of copied text >   (09 Jul 2021 12:18)      Surgery/Hospital Course:  7/15: C2L w L radial [place] with 2 U pRBCs intra-op    Today:    REVIEW OF SYSTEMS:  Gen: No fever  EYES/ENT: No visual changes;  No vertigo or throat pain   NECK: No pain   RES:  No shortness of breath or Cough  CARD: No chest pain   GI: No abdominal pain  : No dysuria  NEURO: No weakness  SKIN: No itching, rashes     ICU Vital Signs Last 24 Hrs  T(C): 36.6 (16 Jul 2021 08:00), Max: 36.6 (16 Jul 2021 08:00)  T(F): 97.9 (16 Jul 2021 08:00), Max: 97.9 (16 Jul 2021 08:00)  HR: 84 (16 Jul 2021 09:00) (59 - 96)  BP: 137/69 (16 Jul 2021 09:00) (120/62 - 138/66)  BP(mean): 96 (16 Jul 2021 09:00) (85 - 98)  ABP: 113/97 (16 Jul 2021 04:00) (93/46 - 146/58)  ABP(mean): 105 (16 Jul 2021 04:00) (63 - 120)  RR: 18 (16 Jul 2021 09:00) (0 - 37)  SpO2: 92% (16 Jul 2021 09:00) (88% - 100%)      Physical Exam:  Gen:  Awake, alert   CNS: non focal 	  Neck: no JVD  RES : clear , no wheezing  Chest: +Chest tubes              CVS: Regular  rhythm. Normal S1/S2  Abd: Soft, non-distended. Bowel sounds present.  Skin: No rash.  Ext:  no edema    ============================I/O===========================   I&O's Detail    15 Jul 2021 07:01  -  16 Jul 2021 07:00  --------------------------------------------------------  IN:    Albumin 5%  - 250 mL: 1000 mL    Insulin: 33 mL    IV PiggyBack: 350 mL    Norepinephrine: 35.3 mL    PRBCs (Packed Red Blood Cells): 600 mL    sodium chloride 0.9%: 190 mL    Vasopressin: 10 mL  Total IN: 2218.3 mL    OUT:    Chest Tube (mL): 300 mL    Chest Tube (mL): 160 mL    Indwelling Catheter - Urethral (mL): 1045 mL  Total OUT: 1505 mL    Total NET: 713.3 mL      16 Jul 2021 07:01  -  16 Jul 2021 11:36  --------------------------------------------------------  IN:    IV PiggyBack: 100 mL    Oral Fluid: 240 mL    sodium chloride 0.9%: 20 mL  Total IN: 360 mL    OUT:    Chest Tube (mL): 40 mL    Chest Tube (mL): 10 mL    Indwelling Catheter - Urethral (mL): 60 mL  Total OUT: 110 mL    Total NET: 250 mL        ============================ LABS =========================                        10.7   16.83 )-----------( 110      ( 16 Jul 2021 06:27 )             32.1     07-16    139  |  106  |  16  ----------------------------<  92  4.1   |  20<L>  |  1.10    Ca    9.2      16 Jul 2021 00:22  Phos  2.7     07-16  Mg     2.7     07-16    TPro  5.3<L>  /  Alb  4.1  /  TBili  2.0<H>  /  DBili  x   /  AST  20  /  ALT  14  /  AlkPhos  36<L>  07-16    LIVER FUNCTIONS - ( 16 Jul 2021 00:22 )  Alb: 4.1 g/dL / Pro: 5.3 g/dL / ALK PHOS: 36 U/L / ALT: 14 U/L / AST: 20 U/L / GGT: x           PT/INR - ( 15 Jul 2021 13:14 )   PT: 13.4 sec;   INR: 1.12 ratio         PTT - ( 15 Jul 2021 13:14 )  PTT:63.1 sec  ABG - ( 16 Jul 2021 00:08 )  pH, Arterial: 7.39  pH, Blood: x     /  pCO2: 39    /  pO2: 118   / HCO3: 23    / Base Excess: -1.3  /  SaO2: 98                  ======================Micro/Rad/Cardio=================  CXR: Reviewed  Echo:Reviewed  ======================================================  PAST MEDICAL & SURGICAL HISTORY:  HTN (hypertension)    Tobacco use    COPD (chronic obstructive pulmonary disease)    PVD (peripheral vascular disease)    No significant past surgical history      ====================ASSESSMENT ==============  CAD s/p C2L with L radial harvest POD #1  Acute blood loss Anemia s/p 2 U pRBCs  PVD  Leukocytosis   Thrombocytopenia   COPD  HTN      Plan:  ====================== NEUROLOGY=====================  Nonfocal, continue to monitor neuro status per ICU protocol.   C/w gabapentin  gabapentin 300 milliGRAM(s) Oral every 12 hours    ==================== RESPIRATORY======================  Receiving supplemental O2 with 6lpm NC  Continue to monitor RR, breathing pattern, pulse ox, and ABG's.  Encourage incentive spirometry.   Pt brought home jewel tovar for her hx of COPD        albuterol/ipratropium for Nebulization 3 milliLiter(s) Nebulizer every 8 hours      ====================CARDIOVASCULAR==================  CAD s/p C2L with L radial harvest POD #1  Diltiazem started for L RADIAL HARVEST SITE   ASA and Lipitor for graft patency   VVI back up pacing wires   LP chest tube removed     diltiazem    Tablet 30 milliGRAM(s) Oral every 6 hours  atorvastatin 40 milliGRAM(s) Oral at bedtime  aspirin enteric coated 81 milliGRAM(s) Oral daily  ===================HEMATOLOGIC/ONC ===================  Acute blood loss anemia s/p 2 U pRBCs intra-op and post op in ctu got additional 2prbcs   Thrombocytopenia ~110  Monitor H&H/Plts  Monitor chest tube outputs   Continue Lovenox for venous thromboembolism prophylaxis.     enoxaparin Injectable 40 milliGRAM(s) SubCutaneous daily  ===================== RENAL =========================  Continue to monitor I/Os, BUN/Creatinine, and urine output.   Goal net negative fluid balance. Replete lytes PRN. Keep K> 4 and Mg >2.   C/w tamsulosin    tamsulosin 0.4 milliGRAM(s) Oral at bedtime  ==================== GASTROINTESTINAL===================  Tolerating PO consistent carb diet.   Continue Pepcid for stress ulcer prophylaxis.   Bowel regimen with Miralax.     famotidine    Tablet 20 milliGRAM(s) Oral two times a day  polyethylene glycol 3350 17 Gram(s) Oral daily  sodium chloride 0.9%. 1000 milliLiter(s) (10 mL/Hr) IV Continuous <Continuous>  =======================    ENDOCRINE  =====================  Stress hyperglycemia requiring Admelog sliding scale  Monitor blood glucose levels.   insulin lispro (ADMELOG) corrective regimen sliding scale   SubCutaneous three times a day before meals  insulin lispro (ADMELOG) corrective regimen sliding scale   SubCutaneous at bedtime    ========================INFECTIOUS DISEASE================  Afebrile, Leukocytosis with WBC at 16.83  Monitor temperature and trend WBC.   Continue Cefuroxime for perioperative antibiotic coverage,   cefuroxime  IVPB 1500 milliGRAM(s) IV Intermittent every 8 hours      Patient requires continuous monitoring with bedside rhythm monitoring, pulse ox monitoring, and intermittent blood gas analysis. Care plan discussed with ICU care team. Patient remained critical and at risk for life threatening decompensation.     By signing my name below, I, Lucila Crawley, attest that this documentation has been prepared under the direction and in the presence of SERGIO Medeiros  Electronically signed: Lucila Crawley Scribe, 07-16-21 @ 11:36    IMarry PA , personally performed the services described in this documentation. all medical record entries made by the scribe were at my direction and in my presence. I have reviewed the chart and agree that the record reflects my personal performance and is accurate and complete  Electronically signed: SERGIO Medeiros

## 2021-07-16 NOTE — PROGRESS NOTE ADULT - SUBJECTIVE AND OBJECTIVE BOX
CHIEF COMPLAINT: f/up sob, preop resp, copd--"good-ready for surgery", minimal sob/cough    Interval Events: nephro/cards f/up; s/p OHS 7/15    REVIEW OF SYSTEMS:  Constitutional: No fevers or chills. No weight loss. No fatigue or generalized malaise.  Eyes: No itching or discharge from the eyes  ENT: No ear pain. No ear discharge. No nasal congestion. No post nasal drip. No epistaxis. No throat pain. No sore throat. No difficulty swallowing.   CV: No chest pain. No palpitations. No lightheadedness or dizziness.   Resp: No dyspnea at rest. No dyspnea on exertion. No orthopnea. No wheezing. No cough. No stridor. No sputum production. No chest pain with respiration.  GI: No nausea. No vomiting. No diarrhea.  MSK: No joint pain or pain in any extremities  Integumentary: No skin lesions. No pedal edema.  Neurological: No gross motor weakness. No sensory changes.  [ ] All other systems negative  [ ] Unable to assess ROS because ________    OBJECTIVE:  ICU Vital Signs Last 24 Hrs  T(C): 35.9 (16 Jul 2021 04:00), Max: 36.5 (16 Jul 2021 00:00)  T(F): 96.6 (16 Jul 2021 04:00), Max: 97.7 (16 Jul 2021 00:00)  HR: 76 (16 Jul 2021 05:13) (59 - 96)  BP: 136/73 (16 Jul 2021 04:00) (136/73 - 136/73)  BP(mean): 98 (16 Jul 2021 04:00) (98 - 98)  ABP: 113/97 (16 Jul 2021 04:00) (93/46 - 146/58)  ABP(mean): 105 (16 Jul 2021 04:00) (63 - 120)  RR: 14 (16 Jul 2021 04:00) (0 - 37)  SpO2: 99% (16 Jul 2021 05:13) (88% - 100%)    Mode: CPAP with PS, FiO2: 50, PEEP: 5, PS: 15, MAP: 11, PIP: 20    07-14 @ 07:01  -  07-15 @ 07:00  --------------------------------------------------------  IN: 597 mL / OUT: 800 mL / NET: -203 mL    07-15 @ 07:01  - 07-16 @ 05:27  --------------------------------------------------------  IN: 2188.3 mL / OUT: 1325 mL / NET: 863.3 mL      CAPILLARY BLOOD GLUCOSE  108 (16 Jul 2021 04:00)      POCT Blood Glucose.: 83 mg/dL (16 Jul 2021 05:09)      PHYSICAL EXAM:  General: Awake, alert, oriented X 3.   HEENT: Atraumatic, normocephalic.                 Mallampatti Grade                 No nasal congestion.                No tonsillar or pharyngeal exudates.  Lymph Nodes: No palpable lymphadenopathy  Neck: No JVD. No carotid bruit.   Respiratory: Normal chest expansion                         Normal percussion                         Normal and equal air entry                         No wheeze, rhonchi or rales.  Cardiovascular: S1 S2 normal. No murmurs, rubs or gallops.   Abdomen: Soft, non-tender, non-distended. No organomegaly. Normoactive bowel sounds.  Extremities: Warm to touch. Peripheral pulse palpable. No pedal edema.   Skin: No rashes or skin lesions  Neurological: Motor and sensory examination equal and normal in all four extremities.  Psychiatry: Appropriate mood and affect.    HOSPITAL MEDICATIONS:  MEDICATIONS  (STANDING):  albuterol/ipratropium for Nebulization 3 milliLiter(s) Nebulizer every 8 hours  aspirin enteric coated 81 milliGRAM(s) Oral daily  atorvastatin 40 milliGRAM(s) Oral at bedtime  buDESOnide    Inhalation Suspension 0.5 milliGRAM(s) Inhalation every 12 hours  cefuroxime  IVPB 1500 milliGRAM(s) IV Intermittent every 8 hours  chlorhexidine 2% Cloths 1 Application(s) Topical <User Schedule>  insulin regular Infusion 3 Unit(s)/Hr (3 mL/Hr) IV Continuous <Continuous>  norepinephrine Infusion 0.05 MICROgram(s)/kG/Min (4.85 mL/Hr) IV Continuous <Continuous>  pantoprazole  Injectable 40 milliGRAM(s) IV Push daily  polyethylene glycol 3350 17 Gram(s) Oral daily  sodium chloride 0.9%. 1000 milliLiter(s) (10 mL/Hr) IV Continuous <Continuous>    MEDICATIONS  (PRN):      LABS:                        7.2    13.34 )-----------( 113      ( 16 Jul 2021 00:22 )             21.7     07-16    139  |  106  |  16  ----------------------------<  92  4.1   |  20<L>  |  1.10    Ca    9.2      16 Jul 2021 00:22  Phos  2.7     07-16  Mg     2.7     07-16    TPro  5.3<L>  /  Alb  4.1  /  TBili  2.0<H>  /  DBili  x   /  AST  20  /  ALT  14  /  AlkPhos  36<L>  07-16    PT/INR - ( 15 Jul 2021 13:14 )   PT: 13.4 sec;   INR: 1.12 ratio         PTT - ( 15 Jul 2021 13:14 )  PTT:63.1 sec    Arterial Blood Gas:  07-16 @ 00:08  7.39/39/118/23/98/-1.3  ABG lactate: --  Arterial Blood Gas:  07-15 @ 21:37  7.32/40/80/20/95/-4.9  ABG lactate: --  Arterial Blood Gas:  07-15 @ 20:43  7.34/39/157/20/99/-4.4  ABG lactate: --  Arterial Blood Gas:  07-15 @ 15:45  7.32/43/109/22/98/-3.5  ABG lactate: --  Arterial Blood Gas:  07-15 @ 13:54  7.27/48/107/22/97/-4.5  ABG lactate: --  Arterial Blood Gas:  07-15 @ 13:09  7.24/53/145/22/98/-5.2  ABG lactate: --  Arterial Blood Gas:  07-15 @ 11:16  7.34/45/341/24/100/-1.1  ABG lactate: --  Arterial Blood Gas:  07-15 @ 10:43  7.37/41/449/23/100/-1.1  ABG lactate: --  Arterial Blood Gas:  07-15 @ 10:22  7.39/39/585/23/100/-1.3  ABG lactate: --  Arterial Blood Gas:  07-15 @ 09:58  7.44/36/376/24/100/.1  ABG lactate: --  Arterial Blood Gas:  07-15 @ 09:26  7.32/43/406/22/100/-3.6  ABG lactate: --  Arterial Blood Gas:  07-15 @ 08:16  7.34/44/396/23/100/-2.4  ABG lactate: --    Venous Blood Gas:  07-15 @ 10:44  7.32/50/67/25/91  VBG Lactate: 1.3  Venous Blood Gas:  07-15 @ 10:22  7.34/45/67/24/93  VBG Lactate: 1.3  Venous Blood Gas:  07-15 @ 09:59  7.37/43/73/24/95  VBG Lactate: 0.8      MICROBIOLOGY:     RADIOLOGY:  [ ] Reviewed and interpreted by me    Point of Care Ultrasound Findings:    PFT:    EKG: CHIEF COMPLAINT: f/up sob, preop resp, copd--pain but over all good,  minimal sob/cough    Interval Events: nephro/cards f/up; s/p OHS 7/15    REVIEW OF SYSTEMS:  Constitutional: No fevers or chills. No weight loss. + fatigue or generalized malaise.  Eyes: No itching or discharge from the eyes  ENT: No ear pain. No ear discharge. No nasal congestion. No post nasal drip. No epistaxis. No throat pain. No sore throat. No difficulty swallowing.   CV: No chest pain. No palpitations. No lightheadedness or dizziness.   Resp: No dyspnea at rest. + dyspnea on exertion. No orthopnea. No wheezing. No cough. No stridor. No sputum production. + chest pain with respiration.  GI: No nausea. No vomiting. No diarrhea.  MSK: No joint pain or pain in any extremities  Integumentary: No skin lesions. No pedal edema.  Neurological: No gross motor weakness. No sensory changes.  [ +] All other systems negative  [ ] Unable to assess ROS because ________    OBJECTIVE:  ICU Vital Signs Last 24 Hrs  T(C): 35.9 (16 Jul 2021 04:00), Max: 36.5 (16 Jul 2021 00:00)  T(F): 96.6 (16 Jul 2021 04:00), Max: 97.7 (16 Jul 2021 00:00)  HR: 76 (16 Jul 2021 05:13) (59 - 96)  BP: 136/73 (16 Jul 2021 04:00) (136/73 - 136/73)  BP(mean): 98 (16 Jul 2021 04:00) (98 - 98)  ABP: 113/97 (16 Jul 2021 04:00) (93/46 - 146/58)  ABP(mean): 105 (16 Jul 2021 04:00) (63 - 120)  RR: 14 (16 Jul 2021 04:00) (0 - 37)  SpO2: 99% (16 Jul 2021 05:13) (88% - 100%)    Mode: CPAP with PS, FiO2: 50, PEEP: 5, PS: 15, MAP: 11, PIP: 20    07-14 @ 07:01  -  07-15 @ 07:00  --------------------------------------------------------  IN: 597 mL / OUT: 800 mL / NET: -203 mL    07-15 @ 07:01  - 07-16 @ 05:27  --------------------------------------------------------  IN: 2188.3 mL / OUT: 1325 mL / NET: 863.3 mL      CAPILLARY BLOOD GLUCOSE  108 (16 Jul 2021 04:00)      POCT Blood Glucose.: 83 mg/dL (16 Jul 2021 05:09)      PHYSICAL EXAM: NAD on NC O2  General: Awake, alert, oriented X 3.   HEENT: Atraumatic, normocephalic.                 Mallampatti Grade 2                No nasal congestion.                No tonsillar or pharyngeal exudates.  Lymph Nodes: No palpable lymphadenopathy  Neck: No JVD. No carotid bruit.   Respiratory: abnormal chest expansion 2 CT in place                         Normal percussion                         Normal and equal air entry                         No wheeze, rhonchi or rales.  Cardiovascular: S1 S2 normal. No murmurs, rubs or gallops.   Abdomen: Soft, non-tender, non-distended. No organomegaly. Normoactive bowel sounds.  Extremities: Warm to touch. Peripheral pulse palpable. No pedal edema.   Skin: No rashes or skin lesions  Neurological: Motor and sensory examination equal and normal in all four extremities.  Psychiatry: Appropriate mood and affect.    HOSPITAL MEDICATIONS:  MEDICATIONS  (STANDING):  albuterol/ipratropium for Nebulization 3 milliLiter(s) Nebulizer every 8 hours  aspirin enteric coated 81 milliGRAM(s) Oral daily  atorvastatin 40 milliGRAM(s) Oral at bedtime  buDESOnide    Inhalation Suspension 0.5 milliGRAM(s) Inhalation every 12 hours  cefuroxime  IVPB 1500 milliGRAM(s) IV Intermittent every 8 hours  chlorhexidine 2% Cloths 1 Application(s) Topical <User Schedule>  insulin regular Infusion 3 Unit(s)/Hr (3 mL/Hr) IV Continuous <Continuous>  norepinephrine Infusion 0.05 MICROgram(s)/kG/Min (4.85 mL/Hr) IV Continuous <Continuous>  pantoprazole  Injectable 40 milliGRAM(s) IV Push daily  polyethylene glycol 3350 17 Gram(s) Oral daily  sodium chloride 0.9%. 1000 milliLiter(s) (10 mL/Hr) IV Continuous <Continuous>    MEDICATIONS  (PRN):      LABS:                        7.2    13.34 )-----------( 113      ( 16 Jul 2021 00:22 )             21.7     07-16    139  |  106  |  16  ----------------------------<  92  4.1   |  20<L>  |  1.10    Ca    9.2      16 Jul 2021 00:22  Phos  2.7     07-16  Mg     2.7     07-16    TPro  5.3<L>  /  Alb  4.1  /  TBili  2.0<H>  /  DBili  x   /  AST  20  /  ALT  14  /  AlkPhos  36<L>  07-16    PT/INR - ( 15 Jul 2021 13:14 )   PT: 13.4 sec;   INR: 1.12 ratio         PTT - ( 15 Jul 2021 13:14 )  PTT:63.1 sec    Arterial Blood Gas:  07-16 @ 00:08  7.39/39/118/23/98/-1.3  ABG lactate: --  Arterial Blood Gas:  07-15 @ 21:37  7.32/40/80/20/95/-4.9  ABG lactate: --  Arterial Blood Gas:  07-15 @ 20:43  7.34/39/157/20/99/-4.4  ABG lactate: --  Arterial Blood Gas:  07-15 @ 15:45  7.32/43/109/22/98/-3.5  ABG lactate: --  Arterial Blood Gas:  07-15 @ 13:54  7.27/48/107/22/97/-4.5  ABG lactate: --  Arterial Blood Gas:  07-15 @ 13:09  7.24/53/145/22/98/-5.2  ABG lactate: --  Arterial Blood Gas:  07-15 @ 11:16  7.34/45/341/24/100/-1.1  ABG lactate: --  Arterial Blood Gas:  07-15 @ 10:43  7.37/41/449/23/100/-1.1  ABG lactate: --  Arterial Blood Gas:  07-15 @ 10:22  7.39/39/585/23/100/-1.3  ABG lactate: --  Arterial Blood Gas:  07-15 @ 09:58  7.44/36/376/24/100/.1  ABG lactate: --  Arterial Blood Gas:  07-15 @ 09:26  7.32/43/406/22/100/-3.6  ABG lactate: --  Arterial Blood Gas:  07-15 @ 08:16  7.34/44/396/23/100/-2.4  ABG lactate: --    Venous Blood Gas:  07-15 @ 10:44  7.32/50/67/25/91  VBG Lactate: 1.3  Venous Blood Gas:  07-15 @ 10:22  7.34/45/67/24/93  VBG Lactate: 1.3  Venous Blood Gas:  07-15 @ 09:59  7.37/43/73/24/95  VBG Lactate: 0.8      MICROBIOLOGY:     RADIOLOGY:  [ ] Reviewed and interpreted by me    Point of Care Ultrasound Findings:    PFT:    EKG:

## 2021-07-16 NOTE — PROGRESS NOTE ADULT - ASSESSMENT
Physical Therapy Daily Progress Note      Patient: Rashim Diana   : 1976  Diagnosis/ICD-10 Code:  Traumatic tear of supraspinatus tendon of right shoulder, subsequent encounter [S46.811D]   Problems Addressed this Visit        Musculoskeletal and Integument    Traumatic tear of supraspinatus tendon of right shoulder - Primary      Other Visit Diagnoses     Traumatic tear of right rotator cuff, subsequent encounter        Acute pain of right shoulder          Diagnoses       Codes Comments    Traumatic tear of supraspinatus tendon of right shoulder, subsequent encounter    -  Primary ICD-10-CM: S46.811D  ICD-9-CM: V58.89, 840.6     Traumatic tear of right rotator cuff, subsequent encounter     ICD-10-CM: S46.011D  ICD-9-CM: V58.89, 840.4     Acute pain of right shoulder     ICD-10-CM: M25.511  ICD-9-CM: 719.41         Referring practitioner: Dylan Valencia, *  Date of Initial Visit: Type: THERAPY  Noted: 2020  Today's Date: 2020    VISIT#: 32    Subjective : pt reports that her shoulder is feeling pretty good, nothing new to report.      Objective : added contract/relax stretch at end range flex with good response. Flex PROM supine = ~160-165 deg.  Added prone horiz abd (no wt) and prone thor extension with scap retraction.    See Exercise, Manual, and Modality Logs for complete treatment.     Assessment/Plan : Improved flex ROM in supine with no resistance until near end range. Continued tightness R ER. IR improving. Pt sustaining ROM gains between PT visits now. Pt will benefit from continued PT to further progress scap base strengthening and R shoulder strength for improved functional use of R UE.    Progress per Plan of Care         Timed:         Manual Therapy:    20     mins  50753;     Therapeutic Exercise:    25     mins  33269;     Neuromuscular Mallorie:        mins  90046;    Therapeutic Activity:          mins  87504;     Gait Training:           mins  06493;     Ultrasound:           mins  55022;    Ionto                                   mins   58082  Self Care                            mins   56815  Canalith Repos                   mins  74946    Un-Timed:  Electrical Stimulation:    15     mins  17254 ( );  Dry Needling          mins self-pay  Traction          mins 12470  Low Eval          Mins  01042  Mod Eval          Mins  05931  High Eval                            Mins  21144  Re-Eval                               mins  09594    Timed Treatment:   60   mins   Total Treatment:     70   mins    Minnie Kathleen, PT  Physical Therapist     F current smoker (>50 py history), treated as COPD but PFT showing air trapping and no obstruction present, HTN, PVD presented for abnormal stress test and 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 Pulmonology consulted for possible COPD. Pt now on 2L O2 but may not be needed, not currently in exacerbation.     Problems:  COPD        Recommendation  - Continue trelegy 1 puff daily which pt has at bedside, please have RN bring down to pharmacy so can document in EMR that pt is actually taking  - Albuterol nebulizer q6h PRN  - Maintain O2 saturation > 88%, if she does not desaturate with ambulation then no need for O2/ acapella/incentive spirometry  *****************  7/13-mucusy in am--surgery 7/15                           (see below)  7/14-better today  7/15-ready for surgery-no new resp sx. 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 Pulmonology consulted for possible COPD. Pt now on 2L O2 but may not be needed, not currently in exacerbation.     Problems:  COPD        Recommendation  - Continue trelegy 1 puff daily which pt has at bedside, please have RN bring down to pharmacy so can document in EMR that pt is actually taking  - Albuterol nebulizer q6h PRN  - Maintain O2 saturation > 88%, if she does not desaturate with ambulation then no need for O2/ acapella/incentive spirometry  *****************  7/13-mucusy in am--surgery 7/15                           (see below)  7/14-better today  7/15-ready for surgery-no new resp sx.  7/16-s/p OHS-extubated to NC

## 2021-07-16 NOTE — DIETITIAN INITIAL EVALUATION ADULT. - PERTINENT MEDS FT
MEDICATIONS  (STANDING):  albuterol/ipratropium for Nebulization 3 milliLiter(s) Nebulizer every 8 hours  aspirin enteric coated 81 milliGRAM(s) Oral daily  atorvastatin 40 milliGRAM(s) Oral at bedtime  buDESOnide    Inhalation Suspension 0.5 milliGRAM(s) Inhalation every 12 hours  cefuroxime  IVPB 1500 milliGRAM(s) IV Intermittent every 8 hours  chlorhexidine 2% Cloths 1 Application(s) Topical <User Schedule>  diltiazem    Tablet 30 milliGRAM(s) Oral every 6 hours  enoxaparin Injectable 40 milliGRAM(s) SubCutaneous daily  famotidine    Tablet 20 milliGRAM(s) Oral two times a day  gabapentin 300 milliGRAM(s) Oral every 12 hours  insulin lispro (ADMELOG) corrective regimen sliding scale   SubCutaneous three times a day before meals  insulin lispro (ADMELOG) corrective regimen sliding scale   SubCutaneous at bedtime  nicotine - 21 mG/24Hr(s) Patch 1 patch Transdermal daily  polyethylene glycol 3350 17 Gram(s) Oral daily  sodium chloride 0.9%. 1000 milliLiter(s) (10 mL/Hr) IV Continuous <Continuous>  tamsulosin 0.4 milliGRAM(s) Oral at bedtime

## 2021-07-16 NOTE — PHYSICAL THERAPY INITIAL EVALUATION ADULT - ADDITIONAL COMMENTS
Pt lives w/ cousin in a pvt house w/ no steps to enter. PTA indep for short household distances w/o a device and uses scooter for longer distance 2/2 PVD. Pt plans to goto son's home w/ + steps (sons bump pt up via WC)

## 2021-07-16 NOTE — PROGRESS NOTE ADULT - SUBJECTIVE AND OBJECTIVE BOX
Patient is a 76y old  Female who presents with a chief complaint of C2L (16 Jul 2021 11:35)    Coverage for Dr. Michel Strickland   SUBJECTIVE / OVERNIGHT EVENTS: Comfortable Daughters at bedside.   Review of Systems  chest pain no  palpitations no  sob no  nausea no  headache no    MEDICATIONS  (STANDING):  albuterol/ipratropium for Nebulization 3 milliLiter(s) Nebulizer every 8 hours  aspirin enteric coated 81 milliGRAM(s) Oral daily  atorvastatin 40 milliGRAM(s) Oral at bedtime  cefuroxime  IVPB 1500 milliGRAM(s) IV Intermittent every 8 hours  chlorhexidine 2% Cloths 1 Application(s) Topical <User Schedule>  diltiazem    Tablet 30 milliGRAM(s) Oral every 6 hours  enoxaparin Injectable 40 milliGRAM(s) SubCutaneous daily  famotidine    Tablet 20 milliGRAM(s) Oral two times a day  fluticasone furoate/umeclidinium/vilanterol 100-62.5-25 MICROgram(s) Inhaler 1 Puff(s) Inhalation daily  gabapentin 300 milliGRAM(s) Oral every 12 hours  insulin lispro (ADMELOG) corrective regimen sliding scale   SubCutaneous three times a day before meals  insulin lispro (ADMELOG) corrective regimen sliding scale   SubCutaneous at bedtime  nicotine - 21 mG/24Hr(s) Patch 1 patch Transdermal daily  polyethylene glycol 3350 17 Gram(s) Oral daily  sodium chloride 0.9%. 1000 milliLiter(s) (10 mL/Hr) IV Continuous <Continuous>  tamsulosin 0.4 milliGRAM(s) Oral at bedtime    MEDICATIONS  (PRN):      Vital Signs Last 24 Hrs  T(C): 36.4 (16 Jul 2021 15:08), Max: 36.6 (16 Jul 2021 08:00)  T(F): 97.5 (16 Jul 2021 15:08), Max: 97.9 (16 Jul 2021 08:00)  HR: 77 (16 Jul 2021 15:08) (63 - 96)  BP: 143/70 (16 Jul 2021 15:08) (120/62 - 155/70)  BP(mean): 100 (16 Jul 2021 15:08) (85 - 103)  RR: 18 (16 Jul 2021 15:08) (8 - 29)  SpO2: 92% (16 Jul 2021 15:08) (88% - 100%)    PHYSICAL EXAM:  GENERAL: NAD  HEAD:  Atraumatic, Normocephalic  EYES: EOMI, PERRLA, conjunctiva and sclera clear  NECK: Supple, No JVD  CHEST/LUNG: Clear to auscultation bilaterally; No wheeze Sternal wound with clean dressing. CTX1  HEART: Regular rate and rhythm; No murmurs, rubs, or gallops  ABDOMEN: Soft, Nontender, Nondistended; Bowel sounds present  EXTREMITIES:  2+ Peripheral Pulses, No clubbing, cyanosis, or edema  PSYCH: AAOx3  NEUROLOGY: non-focal  SKIN: No rashes or lesions    LABS:                        10.7   16.83 )-----------( 110      ( 16 Jul 2021 06:27 )             32.1     07-16    139  |  106  |  16  ----------------------------<  92  4.1   |  20<L>  |  1.10    Ca    9.2      16 Jul 2021 00:22  Phos  2.7     07-16  Mg     2.7     07-16    TPro  5.3<L>  /  Alb  4.1  /  TBili  2.0<H>  /  DBili  x   /  AST  20  /  ALT  14  /  AlkPhos  36<L>  07-16    PT/INR - ( 15 Jul 2021 13:14 )   PT: 13.4 sec;   INR: 1.12 ratio         PTT - ( 15 Jul 2021 13:14 )  PTT:63.1 sec  CARDIAC MARKERS ( 15 Jul 2021 13:14 )  x     / x     / 101 U/L / x     / 8.1 ng/mL            RADIOLOGY & ADDITIONAL TESTS:    Imaging Personally Reviewed:    Consultant(s) Notes Reviewed:      Care Discussed with Consultants/Other Providers:

## 2021-07-16 NOTE — DIETITIAN INITIAL EVALUATION ADULT. - REASON INDICATOR FOR ASSESSMENT
Nutrition Assessment warranted for length of stay on CTU  Information obtained from: medical record, communication with team. Pt with nausea at time of visit; interview/education deferred temporarily. Nutrition Assessment warranted for length of stay on CTU  Information obtained from: medical record, communication with team. Pt with nausea at time of visit; interview/education deferred at this time.

## 2021-07-16 NOTE — PROGRESS NOTE ADULT - ATTENDING COMMENTS
as above: stable for OHS today  Multifactorial dyspnea--COPD-CB/emphysema (pt was seen by Dr. Montelongo in the office on 7/8   PFT showed a normal FEV1, TLC, DLCO of 68), CAD, debility--keep sat above 90%  COPD/CB/emphysejma--trelegy 1 q day, incentive spirometry, acapella, albuterol q 6  nicotine addiction-- smoking cessation advised.  lung CA screening---LDCT was to be done, will need f/up ct in 6 months  CAD for OHS 7/15         preop--no absolute pulm contras to OHS at this time though pt at risk for PNA/bronchitis etc.  DVT and GI prophylaxis     renal f/up    Isrrael Klein MD-Pulmonary   [phone] as above: stable s/p OHS 7/15- on NC O2  Multifactorial dyspnea--COPD-CB/emphysema (pt was seen by Dr. Montelongo in the office on 7/8   PFT showed a normal FEV1, TLC, DLCO of 68), CAD, debility--keep sat above 90%  COPD/CB/emphysejma--trelegy 1 q day, incentive spirometry, acapella, albuterol q 6  nicotine addiction-- smoking cessation advised.  lung CA screening---LDCT was to be done, will need f/up ct in 6 months  CAD s/p OHS 7/15; CT as per CTS         DVT and GI prophylaxis     renal f/up    Isrrale Klein MD-Pulmonary   [phone]

## 2021-07-16 NOTE — PHYSICAL THERAPY INITIAL EVALUATION ADULT - PERTINENT HX OF CURRENT PROBLEM, REHAB EVAL
77y/o  female with no known implantable devices noted COVID 19 negative with Pfizer Vaccine last dose 2/4/21 with PMHX of Current tobacco smoker on Nicotine patch , COPD , HTN and PVD (pending Peripheral bypass surgery). Pt had recent stress test that was abnormal on 6/22/21

## 2021-07-17 LAB
ALBUMIN SERPL ELPH-MCNC: 3.9 G/DL — SIGNIFICANT CHANGE UP (ref 3.3–5)
ALP SERPL-CCNC: 53 U/L — SIGNIFICANT CHANGE UP (ref 40–120)
ALT FLD-CCNC: 18 U/L — SIGNIFICANT CHANGE UP (ref 10–45)
ANION GAP SERPL CALC-SCNC: 13 MMOL/L — SIGNIFICANT CHANGE UP (ref 5–17)
AST SERPL-CCNC: 24 U/L — SIGNIFICANT CHANGE UP (ref 10–40)
BILIRUB SERPL-MCNC: 0.7 MG/DL — SIGNIFICANT CHANGE UP (ref 0.2–1.2)
BUN SERPL-MCNC: 16 MG/DL — SIGNIFICANT CHANGE UP (ref 7–23)
CALCIUM SERPL-MCNC: 8.9 MG/DL — SIGNIFICANT CHANGE UP (ref 8.4–10.5)
CHLORIDE SERPL-SCNC: 100 MMOL/L — SIGNIFICANT CHANGE UP (ref 96–108)
CO2 SERPL-SCNC: 19 MMOL/L — LOW (ref 22–31)
CREAT SERPL-MCNC: 1.11 MG/DL — SIGNIFICANT CHANGE UP (ref 0.5–1.3)
GLUCOSE BLDC GLUCOMTR-MCNC: 129 MG/DL — HIGH (ref 70–99)
GLUCOSE BLDC GLUCOMTR-MCNC: 131 MG/DL — HIGH (ref 70–99)
GLUCOSE BLDC GLUCOMTR-MCNC: 155 MG/DL — HIGH (ref 70–99)
GLUCOSE BLDC GLUCOMTR-MCNC: 159 MG/DL — HIGH (ref 70–99)
GLUCOSE SERPL-MCNC: 142 MG/DL — HIGH (ref 70–99)
HCT VFR BLD CALC: 36 % — SIGNIFICANT CHANGE UP (ref 34.5–45)
HGB BLD-MCNC: 12.1 G/DL — SIGNIFICANT CHANGE UP (ref 11.5–15.5)
MAGNESIUM SERPL-MCNC: 2.3 MG/DL — SIGNIFICANT CHANGE UP (ref 1.6–2.6)
MCHC RBC-ENTMCNC: 30.4 PG — SIGNIFICANT CHANGE UP (ref 27–34)
MCHC RBC-ENTMCNC: 33.6 GM/DL — SIGNIFICANT CHANGE UP (ref 32–36)
MCV RBC AUTO: 90.5 FL — SIGNIFICANT CHANGE UP (ref 80–100)
NRBC # BLD: 0 /100 WBCS — SIGNIFICANT CHANGE UP (ref 0–0)
PHOSPHATE SERPL-MCNC: 3.1 MG/DL — SIGNIFICANT CHANGE UP (ref 2.5–4.5)
PLATELET # BLD AUTO: 124 K/UL — LOW (ref 150–400)
POTASSIUM SERPL-MCNC: 5.1 MMOL/L — SIGNIFICANT CHANGE UP (ref 3.5–5.3)
POTASSIUM SERPL-SCNC: 5.1 MMOL/L — SIGNIFICANT CHANGE UP (ref 3.5–5.3)
PROT SERPL-MCNC: 5.8 G/DL — LOW (ref 6–8.3)
RBC # BLD: 3.98 M/UL — SIGNIFICANT CHANGE UP (ref 3.8–5.2)
RBC # FLD: 15.9 % — HIGH (ref 10.3–14.5)
SODIUM SERPL-SCNC: 132 MMOL/L — LOW (ref 135–145)
WBC # BLD: 20.23 K/UL — HIGH (ref 3.8–10.5)
WBC # FLD AUTO: 20.23 K/UL — HIGH (ref 3.8–10.5)

## 2021-07-17 PROCEDURE — 71045 X-RAY EXAM CHEST 1 VIEW: CPT | Mod: 26

## 2021-07-17 RX ORDER — SORBITOL SOLUTION 70 %
30 SOLUTION, ORAL MISCELLANEOUS ONCE
Refills: 0 | Status: COMPLETED | OUTPATIENT
Start: 2021-07-17 | End: 2021-07-17

## 2021-07-17 RX ADMIN — Medication 1 PATCH: at 07:28

## 2021-07-17 RX ADMIN — Medication 30 MILLIGRAM(S): at 02:45

## 2021-07-17 RX ADMIN — TAMSULOSIN HYDROCHLORIDE 0.4 MILLIGRAM(S): 0.4 CAPSULE ORAL at 21:04

## 2021-07-17 RX ADMIN — OXYCODONE AND ACETAMINOPHEN 1 TABLET(S): 5; 325 TABLET ORAL at 08:00

## 2021-07-17 RX ADMIN — Medication 3 MILLILITER(S): at 21:04

## 2021-07-17 RX ADMIN — ENOXAPARIN SODIUM 40 MILLIGRAM(S): 100 INJECTION SUBCUTANEOUS at 11:51

## 2021-07-17 RX ADMIN — Medication 30 MILLIGRAM(S): at 19:38

## 2021-07-17 RX ADMIN — GABAPENTIN 300 MILLIGRAM(S): 400 CAPSULE ORAL at 05:03

## 2021-07-17 RX ADMIN — FAMOTIDINE 20 MILLIGRAM(S): 10 INJECTION INTRAVENOUS at 05:03

## 2021-07-17 RX ADMIN — CHLORHEXIDINE GLUCONATE 1 APPLICATION(S): 213 SOLUTION TOPICAL at 07:08

## 2021-07-17 RX ADMIN — Medication 1 PATCH: at 11:52

## 2021-07-17 RX ADMIN — Medication 81 MILLIGRAM(S): at 11:52

## 2021-07-17 RX ADMIN — Medication 30 MILLIGRAM(S): at 07:07

## 2021-07-17 RX ADMIN — FAMOTIDINE 20 MILLIGRAM(S): 10 INJECTION INTRAVENOUS at 16:38

## 2021-07-17 RX ADMIN — GABAPENTIN 300 MILLIGRAM(S): 400 CAPSULE ORAL at 16:39

## 2021-07-17 RX ADMIN — Medication 3 MILLILITER(S): at 13:18

## 2021-07-17 RX ADMIN — FLUTICASONE FUROATE, UMECLIDINIUM BROMIDE AND VILANTEROL TRIFENATATE 1 PUFF(S): 200; 62.5; 25 POWDER RESPIRATORY (INHALATION) at 13:36

## 2021-07-17 RX ADMIN — Medication 3 MILLILITER(S): at 05:03

## 2021-07-17 RX ADMIN — Medication 30 MILLILITER(S): at 10:33

## 2021-07-17 RX ADMIN — Medication 1 PATCH: at 11:45

## 2021-07-17 RX ADMIN — Medication 1 PATCH: at 19:10

## 2021-07-17 RX ADMIN — OXYCODONE AND ACETAMINOPHEN 1 TABLET(S): 5; 325 TABLET ORAL at 07:26

## 2021-07-17 RX ADMIN — Medication 2: at 07:14

## 2021-07-17 RX ADMIN — Medication 100 MILLIGRAM(S): at 00:10

## 2021-07-17 RX ADMIN — Medication 2: at 16:44

## 2021-07-17 RX ADMIN — ATORVASTATIN CALCIUM 40 MILLIGRAM(S): 80 TABLET, FILM COATED ORAL at 21:04

## 2021-07-17 NOTE — PROGRESS NOTE ADULT - ASSESSMENT
CAD  Triple vessel disease   asa  statin   s/p CABG  plan as per CTS    PVD  surgery on hold for now given above     tobacco   counseling given   nicotine patch     COPD  plan as per pulm   nebs

## 2021-07-17 NOTE — PROGRESS NOTE ADULT - SUBJECTIVE AND OBJECTIVE BOX
VITAL SIGNS    Subjective: "I'm feeling ok." Denies CP, palpitation, SOB, SHEIKH, HA, dizziness, N/V/D, fever or chills.  No acute event noted overnight.     Telemetry: NSR 70-80      Vital Signs Last 24 Hrs  T(C): 36.4 (07-17-21 @ 07:30), Max: 36.9 (07-17-21 @ 02:43)  T(F): 97.5 (07-17-21 @ 07:30), Max: 98.4 (07-17-21 @ 02:43)  HR: 75 (07-17-21 @ 07:30) (73 - 84)  BP: 112/59 (07-17-21 @ 07:30) (112/59 - 155/70)  RR: 22 (07-17-21 @ 07:30) (12 - 22)  SpO2: 93% (07-17-21 @ 07:30) (85% - 95%)           07-16 @ 07:01  -  07-17 @ 07:00  --------------------------------------------------------  IN: 1030 mL / OUT: 805 mL / NET: 225 mL    07-17 @ 07:01  -  07-17 @ 10:04  --------------------------------------------------------  IN: 250 mL / OUT: 40 mL / NET: 210 mL    CAPILLARY BLOOD GLUCOSE  155 (17 Jul 2021 07:30)    POCT Blood Glucose.: 155 mg/dL (17 Jul 2021 07:09)  POCT Blood Glucose.: 147 mg/dL (16 Jul 2021 21:19)  POCT Blood Glucose.: 121 mg/dL (16 Jul 2021 16:53)  POCT Blood Glucose.: 122 mg/dL (16 Jul 2021 11:16)        PHYSICAL EXAM    Neurology: alert and oriented x 3, nonfocal, no gross deficits    CV: (+) S1 and S2, No murmurs, rubs, gallops or clicks     Sternal Wound: MSI -->CDI, sternum stable; PW x 4 --> EPM VVI 50/10. MS CT --> Plerual vac --> LWS negative air leak.       Lungs: CTA B/L     Abdomen: soft, nontender, nondistended, positive bowel sounds, (+) Flatus; (+) BM     :  Voiding -- > Prima Fit External collection device in place              Extremities:  B/L LE (+) trace edema; negative calf tenderness; (+) 2 DP palpable        albuterol/ipratropium for Nebulization 3 milliLiter(s) Nebulizer every 8 hours  aspirin enteric coated 81 milliGRAM(s) Oral daily  atorvastatin 40 milliGRAM(s) Oral at bedtime  chlorhexidine 2% Cloths 1 Application(s) Topical <User Schedule>  diltiazem Tablet 30 milliGRAM(s) Oral every 6 hours  enoxaparin Injectable 40 milliGRAM(s) SubCutaneous daily  famotidine Tablet 20 milliGRAM(s) Oral two times a day  fluticasone furoate/umeclidinium/vilanterol 100-62.5-25 MICROgram(s) Inhaler 1 Puff(s) Inhalation daily  gabapentin 300 milliGRAM(s) Oral every 12 hours  insulin lispro (ADMELOG) corrective regimen sliding scale SubCutaneous three times a day before meals  insulin lispro (ADMELOG) corrective regimen sliding scale SubCutaneous at bedtime  nicotine - 21 mG/24Hr(s) Patch 1 patch Transdermal daily  oxycodone  5 mG/acetaminophen 325 mG 1 Tablet(s) Oral every 6 hours PRN  oxycodone  5 mG/acetaminophen 325 mG 2 Tablet(s) Oral every 6 hours PRN  polyethylene glycol 3350 17 Gram(s) Oral daily  sodium chloride 0.9%. 1000 milliLiter(s) IV Continuous <Continuous>  sorbitol 70% Solution 30 milliLiter(s) Oral once  tamsulosin 0.4 milliGRAM(s) Oral at bedtime    Physical Therapy Rec:   Home  [  ]   Home w/ PT  [ X ]  Rehab  [  ]    Discussed with Cardiothoracic Team at AM rounds.     VITAL SIGNS    Subjective: "I'm feeling ok." Denies CP, palpitation, SOB, SHEIKH, HA, dizziness, N/V/D, fever or chills.  No acute event noted overnight.     Telemetry: NSR 70-80      Vital Signs Last 24 Hrs  T(C): 36.4 (07-17-21 @ 07:30), Max: 36.9 (07-17-21 @ 02:43)  T(F): 97.5 (07-17-21 @ 07:30), Max: 98.4 (07-17-21 @ 02:43)  HR: 75 (07-17-21 @ 07:30) (73 - 84)  BP: 112/59 (07-17-21 @ 07:30) (112/59 - 155/70)  RR: 22 (07-17-21 @ 07:30) (12 - 22)  SpO2: 93% (07-17-21 @ 07:30) (85% - 95%)           07-16 @ 07:01  -  07-17 @ 07:00  --------------------------------------------------------  IN: 1030 mL / OUT: 805 mL / NET: 225 mL    07-17 @ 07:01  -  07-17 @ 10:04  --------------------------------------------------------  IN: 250 mL / OUT: 40 mL / NET: 210 mL    CAPILLARY BLOOD GLUCOSE  155 (17 Jul 2021 07:30)    POCT Blood Glucose.: 155 mg/dL (17 Jul 2021 07:09)  POCT Blood Glucose.: 147 mg/dL (16 Jul 2021 21:19)  POCT Blood Glucose.: 121 mg/dL (16 Jul 2021 16:53)  POCT Blood Glucose.: 122 mg/dL (16 Jul 2021 11:16)        PHYSICAL EXAM    Neurology: alert and oriented x 3, nonfocal, no gross deficits    CV: (+) S1 and S2, No murmurs, rubs, gallops or clicks     Sternal Wound: MSI -->CDI, sternum stable; PW x 4 --> EPM VVI 50/10. MS CT --> Plerual vac --> LWS negative air leak.       Lungs: CTA B/L; Diminished at base.     Abdomen: soft, nontender, nondistended, positive bowel sounds, (+) Flatus; (+) BM     :  Voiding -- > Prima Fit External collection device in place              Extremities:  B/L LE (+) trace edema; negative calf tenderness; (+) 2 DP palpable        albuterol/ipratropium for Nebulization 3 milliLiter(s) Nebulizer every 8 hours  aspirin enteric coated 81 milliGRAM(s) Oral daily  atorvastatin 40 milliGRAM(s) Oral at bedtime  chlorhexidine 2% Cloths 1 Application(s) Topical <User Schedule>  diltiazem Tablet 30 milliGRAM(s) Oral every 6 hours  enoxaparin Injectable 40 milliGRAM(s) SubCutaneous daily  famotidine Tablet 20 milliGRAM(s) Oral two times a day  fluticasone furoate/umeclidinium/vilanterol 100-62.5-25 MICROgram(s) Inhaler 1 Puff(s) Inhalation daily  gabapentin 300 milliGRAM(s) Oral every 12 hours  insulin lispro (ADMELOG) corrective regimen sliding scale SubCutaneous three times a day before meals  insulin lispro (ADMELOG) corrective regimen sliding scale SubCutaneous at bedtime  nicotine - 21 mG/24Hr(s) Patch 1 patch Transdermal daily  oxycodone  5 mG/acetaminophen 325 mG 1 Tablet(s) Oral every 6 hours PRN  oxycodone  5 mG/acetaminophen 325 mG 2 Tablet(s) Oral every 6 hours PRN  polyethylene glycol 3350 17 Gram(s) Oral daily  sodium chloride 0.9%. 1000 milliLiter(s) IV Continuous <Continuous>  sorbitol 70% Solution 30 milliLiter(s) Oral once  tamsulosin 0.4 milliGRAM(s) Oral at bedtime    Physical Therapy Rec:   Home  [  ]   Home w/ PT  [ X ]  Rehab  [  ]    Discussed with Cardiothoracic Team at AM rounds.

## 2021-07-17 NOTE — PROGRESS NOTE ADULT - ASSESSMENT
76 year old female current everyday 3ppd smoker on nicotine patch Hx COPD, PVD, CAD presents to [facility] for LHC, as preop testing prior to PAD procedure. S/p LHC revealing TVD, CT surgery consulted for CABG eval.     7/9/21 CT Surgery evaluation in progress. Preoperative work up in progress. Dr. Cole to review angiogram findings.   7/15/21  S/P  C2L w/ L radial harvest,ef 45  Acute blood loss anemia- p0rbc- basleline hct elevated from smoking hx- 2u intraop and post op  SB- pacing- now nsr  Hypoxia- copd --> Pulmonary following   Cardizem for radial artery graft  7/16 Transferred to SDU on 6 Liters NC 02  76 year old female current everyday 3ppd smoker on nicotine patch Hx COPD, PVD, CAD presents to [facility] for LHC, as preop testing prior to PAD procedure. S/p LHC revealing TVD, CT surgery consulted for CABG eval.     7/9/21 CT Surgery evaluation in progress. Preoperative work up in progress. Dr. Cole to review angiogram findings.   7/15/21  S/P  C2L w/ L radial harvest,ef 45  Acute blood loss anemia- prbc- basleline hct elevated from smoking hx- 2u intraop and post op  SB- pacing- now nsr  Hypoxia- copd --> Pulmonary following   Cardizem for radial artery graft  7/16 Transferred to SDU on 6 Liters NC 02.   7/17 VVS; PW x 4 D/C'd.  Right IJ D/C'D and MS CT removed.  Continue on 6 Liters NC 02 placed on humidified air 02 SAT 88-89%.  Pulmonary following --> continue on home inhaler Trelegy inh 1 puff daily and Douneb neb Q6. No BB 2/2 severe COPD with hypoxemia.

## 2021-07-17 NOTE — PROGRESS NOTE ADULT - PROBLEM SELECTOR PLAN 2
copd-bronchodilators  Pulm Pulmonary following   Continue Trelegy 1 puff daily which pt has at bedside RN to bring down to pharmacy so can document in EMR that pt is actually taking  Continue on Albuterol nebulizer q6h PRN  Encourage Chest PT / Pulmonary toileting and Incentive spirometry every 1 hour x 10 while awake.  Continue on 6 Liters NC 02 placed on humidified air 02 SAT 88-89% --> Wean 02 NC for a 02 sat > 88%.

## 2021-07-17 NOTE — PROGRESS NOTE ADULT - PROBLEM SELECTOR PLAN 1
Asa, Statin, Chest PT,  Incentive spirometry, wound care and assessment.  Ambulate   cardizem-radial graft Continue with ASA 81 mg PO Daily.   No BB 2/2 severe COPD w/ hypoxemia   Continue with Lipitor 40 mg PO HS    Continue on Cardizem 30 mg PO Q6 hours for radial graft  Increase activity as tolerated.   D/C PW and Right IJ   D/C MS CT  --> Follow up CXR   Encourage Chest PT / Pulmonary toileting and Incentive spirometry every 1 hour x 10 while awake.   Continue with PUD and DVT prophylaxis.   Shower on POD #5.   D/C plan home PT once medically cleared   Plan of care discussed with attending

## 2021-07-17 NOTE — PROGRESS NOTE ADULT - ASSESSMENT
pt  is a 75y/o  female with PMHX of Current tobacco smoker on Nicotine patch , COPD , HTN and PVD.   Pt had recent stress test that was abnormal on 6/22/21    s/p cabg X2  Postop care  hyponatremia likely sec to hctz  improved  renal f/u  copd  pulm f/u noted  c/w outpt meds  PT  dvt proph   gi proph     Jovani Boyce MD pager [phone]

## 2021-07-17 NOTE — PROGRESS NOTE ADULT - SUBJECTIVE AND OBJECTIVE BOX
DATE OF SERVICE: 07-17-21 @ 10:52    Subjective: Patient seen and examined. No new events except as noted.     SUBJECTIVE/ROS:  feels better  in good spirit       MEDICATIONS:  MEDICATIONS  (STANDING):  albuterol/ipratropium for Nebulization 3 milliLiter(s) Nebulizer every 8 hours  aspirin enteric coated 81 milliGRAM(s) Oral daily  atorvastatin 40 milliGRAM(s) Oral at bedtime  chlorhexidine 2% Cloths 1 Application(s) Topical <User Schedule>  diltiazem    Tablet 30 milliGRAM(s) Oral every 6 hours  enoxaparin Injectable 40 milliGRAM(s) SubCutaneous daily  famotidine    Tablet 20 milliGRAM(s) Oral two times a day  fluticasone furoate/umeclidinium/vilanterol 100-62.5-25 MICROgram(s) Inhaler 1 Puff(s) Inhalation daily  gabapentin 300 milliGRAM(s) Oral every 12 hours  insulin lispro (ADMELOG) corrective regimen sliding scale   SubCutaneous three times a day before meals  insulin lispro (ADMELOG) corrective regimen sliding scale   SubCutaneous at bedtime  nicotine - 21 mG/24Hr(s) Patch 1 patch Transdermal daily  polyethylene glycol 3350 17 Gram(s) Oral daily  sodium chloride 0.9%. 1000 milliLiter(s) (10 mL/Hr) IV Continuous <Continuous>  tamsulosin 0.4 milliGRAM(s) Oral at bedtime      PHYSICAL EXAM:  T(C): 36.4 (07-17-21 @ 07:30), Max: 36.9 (07-17-21 @ 02:43)  HR: 75 (07-17-21 @ 07:30) (73 - 84)  BP: 112/59 (07-17-21 @ 07:30) (112/59 - 155/70)  RR: 22 (07-17-21 @ 07:30) (12 - 22)  SpO2: 93% (07-17-21 @ 07:30) (85% - 95%)  Wt(kg): --  I&O's Summary    16 Jul 2021 07:01  -  17 Jul 2021 07:00  --------------------------------------------------------  IN: 1030 mL / OUT: 805 mL / NET: 225 mL    17 Jul 2021 07:01  -  17 Jul 2021 10:52  --------------------------------------------------------  IN: 250 mL / OUT: 40 mL / NET: 210 mL            JVP: Normal  Neck: supple  Lung: clear   CV: S1 S2 , Murmur:  Abd: soft  Ext: No edema  neuro: Awake / alert  Psych: flat affect  Skin: normal``    LABS/DATA:    CARDIAC MARKERS:  CARDIAC MARKERS ( 15 Jul 2021 13:14 )  x     / x     / 101 U/L / x     / 8.1 ng/mL                                12.1   20.23 )-----------( 124      ( 17 Jul 2021 04:31 )             36.0     07-17    132<L>  |  100  |  16  ----------------------------<  142<H>  5.1   |  19<L>  |  1.11    Ca    8.9      17 Jul 2021 04:31  Phos  3.1     07-17  Mg     2.3     07-17    TPro  5.8<L>  /  Alb  3.9  /  TBili  0.7  /  DBili  x   /  AST  24  /  ALT  18  /  AlkPhos  53  07-17    proBNP:   Lipid Profile:   HgA1c:   TSH:     TELE:  EKG:

## 2021-07-17 NOTE — PROGRESS NOTE ADULT - SUBJECTIVE AND OBJECTIVE BOX
Patient is a 76y old  Female who presents with a chief complaint of Cardiac Surgery (17 Jul 2021 10:03)    Coverage for Dr. Michel Strickland   SUBJECTIVE / OVERNIGHT EVENTS: Comfortable without new complaints.   Review of Systems  chest pain no  palpitations no  sob no  nausea no  headache no    MEDICATIONS  (STANDING):  albuterol/ipratropium for Nebulization 3 milliLiter(s) Nebulizer every 8 hours  aspirin enteric coated 81 milliGRAM(s) Oral daily  atorvastatin 40 milliGRAM(s) Oral at bedtime  chlorhexidine 2% Cloths 1 Application(s) Topical <User Schedule>  diltiazem    Tablet 30 milliGRAM(s) Oral every 6 hours  enoxaparin Injectable 40 milliGRAM(s) SubCutaneous daily  famotidine    Tablet 20 milliGRAM(s) Oral two times a day  fluticasone furoate/umeclidinium/vilanterol 100-62.5-25 MICROgram(s) Inhaler 1 Puff(s) Inhalation daily  gabapentin 300 milliGRAM(s) Oral every 12 hours  insulin lispro (ADMELOG) corrective regimen sliding scale   SubCutaneous three times a day before meals  insulin lispro (ADMELOG) corrective regimen sliding scale   SubCutaneous at bedtime  nicotine - 21 mG/24Hr(s) Patch 1 patch Transdermal daily  polyethylene glycol 3350 17 Gram(s) Oral daily  sodium chloride 0.9%. 1000 milliLiter(s) (10 mL/Hr) IV Continuous <Continuous>  tamsulosin 0.4 milliGRAM(s) Oral at bedtime    MEDICATIONS  (PRN):  oxycodone    5 mG/acetaminophen 325 mG 1 Tablet(s) Oral every 6 hours PRN Moderate Pain (4 - 6)  oxycodone    5 mG/acetaminophen 325 mG 2 Tablet(s) Oral every 6 hours PRN Severe Pain (7 - 10)      Vital Signs Last 24 Hrs  T(C): 36.3 (17 Jul 2021 11:30), Max: 36.9 (17 Jul 2021 02:43)  T(F): 97.4 (17 Jul 2021 11:30), Max: 98.4 (17 Jul 2021 02:43)  HR: 72 (17 Jul 2021 11:30) (72 - 83)  BP: 100/54 (17 Jul 2021 11:30) (100/54 - 148/67)  BP(mean): 71 (17 Jul 2021 11:30) (71 - 102)  RR: 22 (17 Jul 2021 07:30) (18 - 22)  SpO2: 93% (17 Jul 2021 11:30) (85% - 93%)    PHYSICAL EXAM:  GENERAL: NAD, well-developed  HEAD:  Atraumatic, Normocephalic  EYES: EOMI, PERRLA, conjunctiva and sclera clear  NECK: Supple, No JVD  CHEST/LUNG: Clear to auscultation bilaterally; No wheeze Sternal wound with clean dressing  HEART: Regular rate and rhythm; No murmurs, rubs, or gallops  ABDOMEN: Soft, Nontender, Nondistended; Bowel sounds present  EXTREMITIES:  2+ Peripheral Pulses, No clubbing, cyanosis, or edema  PSYCH: AAOx3  NEUROLOGY: non-focal  SKIN: No rashes or lesions    LABS:                        12.1   20.23 )-----------( 124      ( 17 Jul 2021 04:31 )             36.0     07-17    132<L>  |  100  |  16  ----------------------------<  142<H>  5.1   |  19<L>  |  1.11    Ca    8.9      17 Jul 2021 04:31  Phos  3.1     07-17  Mg     2.3     07-17    TPro  5.8<L>  /  Alb  3.9  /  TBili  0.7  /  DBili  x   /  AST  24  /  ALT  18  /  AlkPhos  53  07-17                RADIOLOGY & ADDITIONAL TESTS:    Imaging Personally Reviewed:    Consultant(s) Notes Reviewed:      Care Discussed with Consultants/Other Providers:

## 2021-07-18 LAB
ANION GAP SERPL CALC-SCNC: 12 MMOL/L — SIGNIFICANT CHANGE UP (ref 5–17)
BUN SERPL-MCNC: 19 MG/DL — SIGNIFICANT CHANGE UP (ref 7–23)
CALCIUM SERPL-MCNC: 9.1 MG/DL — SIGNIFICANT CHANGE UP (ref 8.4–10.5)
CHLORIDE SERPL-SCNC: 101 MMOL/L — SIGNIFICANT CHANGE UP (ref 96–108)
CO2 SERPL-SCNC: 19 MMOL/L — LOW (ref 22–31)
CREAT SERPL-MCNC: 1.04 MG/DL — SIGNIFICANT CHANGE UP (ref 0.5–1.3)
GLUCOSE BLDC GLUCOMTR-MCNC: 123 MG/DL — HIGH (ref 70–99)
GLUCOSE BLDC GLUCOMTR-MCNC: 128 MG/DL — HIGH (ref 70–99)
GLUCOSE BLDC GLUCOMTR-MCNC: 130 MG/DL — HIGH (ref 70–99)
GLUCOSE BLDC GLUCOMTR-MCNC: 159 MG/DL — HIGH (ref 70–99)
GLUCOSE SERPL-MCNC: 124 MG/DL — HIGH (ref 70–99)
HCT VFR BLD CALC: 35.5 % — SIGNIFICANT CHANGE UP (ref 34.5–45)
HGB BLD-MCNC: 11.9 G/DL — SIGNIFICANT CHANGE UP (ref 11.5–15.5)
MAGNESIUM SERPL-MCNC: 2.3 MG/DL — SIGNIFICANT CHANGE UP (ref 1.6–2.6)
MCHC RBC-ENTMCNC: 30.2 PG — SIGNIFICANT CHANGE UP (ref 27–34)
MCHC RBC-ENTMCNC: 33.5 GM/DL — SIGNIFICANT CHANGE UP (ref 32–36)
MCV RBC AUTO: 90.1 FL — SIGNIFICANT CHANGE UP (ref 80–100)
NRBC # BLD: 0 /100 WBCS — SIGNIFICANT CHANGE UP (ref 0–0)
PHOSPHATE SERPL-MCNC: 2.3 MG/DL — LOW (ref 2.5–4.5)
PLATELET # BLD AUTO: 122 K/UL — LOW (ref 150–400)
POTASSIUM SERPL-MCNC: 5.1 MMOL/L — SIGNIFICANT CHANGE UP (ref 3.5–5.3)
POTASSIUM SERPL-SCNC: 5.1 MMOL/L — SIGNIFICANT CHANGE UP (ref 3.5–5.3)
RBC # BLD: 3.94 M/UL — SIGNIFICANT CHANGE UP (ref 3.8–5.2)
RBC # FLD: 15.6 % — HIGH (ref 10.3–14.5)
SODIUM SERPL-SCNC: 132 MMOL/L — LOW (ref 135–145)
WBC # BLD: 18 K/UL — HIGH (ref 3.8–10.5)
WBC # FLD AUTO: 18 K/UL — HIGH (ref 3.8–10.5)

## 2021-07-18 PROCEDURE — 99231 SBSQ HOSP IP/OBS SF/LOW 25: CPT

## 2021-07-18 PROCEDURE — 71045 X-RAY EXAM CHEST 1 VIEW: CPT | Mod: 26

## 2021-07-18 RX ADMIN — ATORVASTATIN CALCIUM 40 MILLIGRAM(S): 80 TABLET, FILM COATED ORAL at 21:05

## 2021-07-18 RX ADMIN — Medication 3 MILLILITER(S): at 21:06

## 2021-07-18 RX ADMIN — CHLORHEXIDINE GLUCONATE 1 APPLICATION(S): 213 SOLUTION TOPICAL at 08:43

## 2021-07-18 RX ADMIN — Medication 2: at 12:11

## 2021-07-18 RX ADMIN — Medication 1 PATCH: at 08:05

## 2021-07-18 RX ADMIN — POLYETHYLENE GLYCOL 3350 17 GRAM(S): 17 POWDER, FOR SOLUTION ORAL at 12:07

## 2021-07-18 RX ADMIN — OXYCODONE AND ACETAMINOPHEN 1 TABLET(S): 5; 325 TABLET ORAL at 17:38

## 2021-07-18 RX ADMIN — Medication 30 MILLIGRAM(S): at 13:02

## 2021-07-18 RX ADMIN — FAMOTIDINE 20 MILLIGRAM(S): 10 INJECTION INTRAVENOUS at 06:05

## 2021-07-18 RX ADMIN — Medication 30 MILLIGRAM(S): at 07:47

## 2021-07-18 RX ADMIN — Medication 1 PATCH: at 11:45

## 2021-07-18 RX ADMIN — OXYCODONE AND ACETAMINOPHEN 1 TABLET(S): 5; 325 TABLET ORAL at 18:30

## 2021-07-18 RX ADMIN — Medication 3 MILLILITER(S): at 06:05

## 2021-07-18 RX ADMIN — Medication 81 MILLIGRAM(S): at 12:07

## 2021-07-18 RX ADMIN — Medication 30 MILLIGRAM(S): at 01:46

## 2021-07-18 RX ADMIN — FLUTICASONE FUROATE, UMECLIDINIUM BROMIDE AND VILANTEROL TRIFENATATE 1 PUFF(S): 200; 62.5; 25 POWDER RESPIRATORY (INHALATION) at 13:15

## 2021-07-18 RX ADMIN — FAMOTIDINE 20 MILLIGRAM(S): 10 INJECTION INTRAVENOUS at 17:26

## 2021-07-18 RX ADMIN — OXYCODONE AND ACETAMINOPHEN 2 TABLET(S): 5; 325 TABLET ORAL at 00:19

## 2021-07-18 RX ADMIN — Medication 1 PATCH: at 12:07

## 2021-07-18 RX ADMIN — ENOXAPARIN SODIUM 40 MILLIGRAM(S): 100 INJECTION SUBCUTANEOUS at 12:07

## 2021-07-18 RX ADMIN — Medication 30 MILLIGRAM(S): at 19:58

## 2021-07-18 RX ADMIN — GABAPENTIN 300 MILLIGRAM(S): 400 CAPSULE ORAL at 17:26

## 2021-07-18 RX ADMIN — Medication 1 PATCH: at 19:07

## 2021-07-18 RX ADMIN — OXYCODONE AND ACETAMINOPHEN 2 TABLET(S): 5; 325 TABLET ORAL at 01:00

## 2021-07-18 RX ADMIN — TAMSULOSIN HYDROCHLORIDE 0.4 MILLIGRAM(S): 0.4 CAPSULE ORAL at 21:05

## 2021-07-18 RX ADMIN — GABAPENTIN 300 MILLIGRAM(S): 400 CAPSULE ORAL at 06:05

## 2021-07-18 RX ADMIN — Medication 3 MILLILITER(S): at 13:02

## 2021-07-18 NOTE — PROGRESS NOTE ADULT - PROBLEM SELECTOR PLAN 1
Continue with ASA 81 mg PO Daily.   No BB 2/2 severe COPD w/ hypoxemia   Continue with Lipitor 40 mg PO HS    Continue on Cardizem 30 mg PO Q6 hours for radial graft  Increase activity as tolerated.   D/C PW and Right IJ   D/C MS CT  --> Follow up CXR   D/C plan home PT once medically cleared   Plan of care discussed with attending No BB 2/2 severe COPD w/ hypoxemia   Continue with Lipitor 40 mg PO HS    Continue on Cardizem 30 mg PO Q6 hours for radial graft  D/C plan home PT once medically cleared   Plan of care discussed with attending    trf too floor

## 2021-07-18 NOTE — PROGRESS NOTE ADULT - SUBJECTIVE AND OBJECTIVE BOX
Interval Events:  Doing well s/p cabg  ambulating - "walked further yesterday than I could walk for years"  denies sob . pain well controlled    REVIEW OF SYSTEMS:  Constitutional: No fevers or chills. No weight loss. No fatigue or generalised malaise.  Eyes: No itching or discharge from the eyes  ENT: No ear pain. No ear discharge. No nasal congestion. No post nasal drip. No epistaxis. No throat pain. No sore throat. No difficulty swallowing.   CV: HPI  Resp: HPI  GI: No nausea. No vomiting. No diarrhea.  MSK: No joint pain or pain in any extremities  Integumentary: No skin lesions. No pedal edema.  Neurological: No gross motor weakness. No sensory changes.  [ x] All other systems negative  [ ] Unable to assess ROS because ________    OBJECTIVE:  ICU Vital Signs Last 24 Hrs  T(C): 36.6 (18 Jul 2021 07:01), Max: 36.9 (17 Jul 2021 23:07)  T(F): 97.9 (18 Jul 2021 07:01), Max: 98.4 (17 Jul 2021 23:07)  HR: 76 (18 Jul 2021 07:01) (72 - 82)  BP: 109/58 (18 Jul 2021 07:01) (100/54 - 139/62)  BP(mean): 75 (18 Jul 2021 07:01) (71 - 89)  ABP: --  ABP(mean): --  RR: 18 (18 Jul 2021 07:01) (18 - 19)  SpO2: 89% (18 Jul 2021 07:01) (88% - 93%)        07-17 @ 07:01  -  07-18 @ 07:00  --------------------------------------------------------  IN: 750 mL / OUT: 1190 mL / NET: -440 mL    07-18 @ 07:01  -  07-18 @ 10:10  --------------------------------------------------------  IN: 100 mL / OUT: 0 mL / NET: 100 mL      CAPILLARY BLOOD GLUCOSE  123 (18 Jul 2021 07:01)      POCT Blood Glucose.: 123 mg/dL (18 Jul 2021 07:52)      PHYSICAL EXAM:  General: Awake, alert, oriented X 3.   HEENT: Atraumatic, normocephalic.                 Mallampatti Grade                 No nasal congestion.                No tonsillar or pharyngeal exudates.  Lymph Nodes: No palpable lymphadenopathy  Neck: No JVD. No carotid bruit.   Respiratory: Normal chest expansion                         Normal percussion                         Normal and equal air entry                         No wheeze, rhonchi or rales.  midsternal incision, clean dressing  Cardiovascular: S1 S2 normal. No murmurs, rubs or gallops.   Abdomen: Soft, non-tender, non-distended. No organomegaly.  Extremities: Warm to touch. Peripheral pulse palpable. No pedal edema.   Skin: No rashes or skin lesions  Neurological: Motor and sensory examination equal and normal in all four extremities.  Psychiatry: Appropriate mood and affect.    HOSPITAL MEDICATIONS:  MEDICATIONS  (STANDING):  albuterol/ipratropium for Nebulization 3 milliLiter(s) Nebulizer every 8 hours  aspirin enteric coated 81 milliGRAM(s) Oral daily  atorvastatin 40 milliGRAM(s) Oral at bedtime  chlorhexidine 2% Cloths 1 Application(s) Topical <User Schedule>  diltiazem    Tablet 30 milliGRAM(s) Oral every 6 hours  enoxaparin Injectable 40 milliGRAM(s) SubCutaneous daily  famotidine    Tablet 20 milliGRAM(s) Oral two times a day  fluticasone furoate/umeclidinium/vilanterol 100-62.5-25 MICROgram(s) Inhaler 1 Puff(s) Inhalation daily  gabapentin 300 milliGRAM(s) Oral every 12 hours  insulin lispro (ADMELOG) corrective regimen sliding scale   SubCutaneous three times a day before meals  insulin lispro (ADMELOG) corrective regimen sliding scale   SubCutaneous at bedtime  nicotine - 21 mG/24Hr(s) Patch 1 patch Transdermal daily  polyethylene glycol 3350 17 Gram(s) Oral daily  sodium chloride 0.9%. 1000 milliLiter(s) (10 mL/Hr) IV Continuous <Continuous>  tamsulosin 0.4 milliGRAM(s) Oral at bedtime    MEDICATIONS  (PRN):  oxycodone    5 mG/acetaminophen 325 mG 1 Tablet(s) Oral every 6 hours PRN Moderate Pain (4 - 6)  oxycodone    5 mG/acetaminophen 325 mG 2 Tablet(s) Oral every 6 hours PRN Severe Pain (7 - 10)      LABS:                        11.9   18.00 )-----------( 122      ( 18 Jul 2021 04:20 )             35.5     07-18    132<L>  |  101  |  19  ----------------------------<  124<H>  5.1   |  19<L>  |  1.04    Ca    9.1      18 Jul 2021 04:20  Phos  2.3     07-18  Mg     2.3     07-18    TPro  5.8<L>  /  Alb  3.9  /  TBili  0.7  /  DBili  x   /  AST  24  /  ALT  18  /  AlkPhos  53  07-17              MICROBIOLOGY:     RADIOLOGY:  [ ] Reviewed and interpreted by me    Point of Care Ultrasound Findings:    PFT:    EKG:

## 2021-07-18 NOTE — PROGRESS NOTE ADULT - SUBJECTIVE AND OBJECTIVE BOX
Patient is a 76y old  Female who presents with a chief complaint of cabg (18 Jul 2021 10:10)    Coverage for Dr. Michel Strickland   SUBJECTIVE / OVERNIGHT EVENTS: Comfortable   Review of Systems  chest pain no  palpitations no  sob improving   nausea no  headache no    MEDICATIONS  (STANDING):  albuterol/ipratropium for Nebulization 3 milliLiter(s) Nebulizer every 8 hours  aspirin enteric coated 81 milliGRAM(s) Oral daily  atorvastatin 40 milliGRAM(s) Oral at bedtime  chlorhexidine 2% Cloths 1 Application(s) Topical <User Schedule>  diltiazem    Tablet 30 milliGRAM(s) Oral every 6 hours  enoxaparin Injectable 40 milliGRAM(s) SubCutaneous daily  famotidine    Tablet 20 milliGRAM(s) Oral two times a day  fluticasone furoate/umeclidinium/vilanterol 100-62.5-25 MICROgram(s) Inhaler 1 Puff(s) Inhalation daily  gabapentin 300 milliGRAM(s) Oral every 12 hours  insulin lispro (ADMELOG) corrective regimen sliding scale   SubCutaneous three times a day before meals  insulin lispro (ADMELOG) corrective regimen sliding scale   SubCutaneous at bedtime  nicotine - 21 mG/24Hr(s) Patch 1 patch Transdermal daily  polyethylene glycol 3350 17 Gram(s) Oral daily  sodium chloride 0.9%. 1000 milliLiter(s) (10 mL/Hr) IV Continuous <Continuous>  tamsulosin 0.4 milliGRAM(s) Oral at bedtime    MEDICATIONS  (PRN):  oxycodone    5 mG/acetaminophen 325 mG 1 Tablet(s) Oral every 6 hours PRN Moderate Pain (4 - 6)  oxycodone    5 mG/acetaminophen 325 mG 2 Tablet(s) Oral every 6 hours PRN Severe Pain (7 - 10)      Vital Signs Last 24 Hrs  T(C): 36.7 (18 Jul 2021 11:47), Max: 36.9 (17 Jul 2021 23:07)  T(F): 98 (18 Jul 2021 11:47), Max: 98.4 (17 Jul 2021 23:07)  HR: 79 (18 Jul 2021 11:47) (72 - 82)  BP: 109/59 (18 Jul 2021 11:47) (109/58 - 139/62)  BP(mean): 79 (18 Jul 2021 11:47) (75 - 89)  RR: 18 (18 Jul 2021 11:47) (18 - 19)  SpO2: 92% (18 Jul 2021 11:47) (88% - 92%)    PHYSICAL EXAM:  GENERAL: NAD  HEAD:  Atraumatic, Normocephalic  EYES: EOMI, PERRLA, conjunctiva and sclera clear  NECK: Supple, No JVD  CHEST/LUNG: Clear to auscultation bilaterally; No wheeze Sternal wound with clean dressing.  HEART: Regular rate and rhythm; No murmurs, rubs, or gallops  ABDOMEN: Soft, Nontender, Nondistended; Bowel sounds present  EXTREMITIES:  2+ Peripheral Pulses, No clubbing, cyanosis, or edema  PSYCH: AAOx3  NEUROLOGY: non-focal  SKIN: No rashes or lesions    LABS:                        11.9   18.00 )-----------( 122      ( 18 Jul 2021 04:20 )             35.5     07-18    132<L>  |  101  |  19  ----------------------------<  124<H>  5.1   |  19<L>  |  1.04    Ca    9.1      18 Jul 2021 04:20  Phos  2.3     07-18  Mg     2.3     07-18    TPro  5.8<L>  /  Alb  3.9  /  TBili  0.7  /  DBili  x   /  AST  24  /  ALT  18  /  AlkPhos  53  07-17                RADIOLOGY & ADDITIONAL TESTS:    Imaging Personally Reviewed:    Consultant(s) Notes Reviewed:      Care Discussed with Consultants/Other Providers:

## 2021-07-18 NOTE — PROGRESS NOTE ADULT - ASSESSMENT
75 y/o woman, extensive tobacco hx (quit as of this admission/surgery), COPD  s/p CABG  feeling well.  ambulating, no sob -- 'better than I could do for years"  Daily Trelegy, and on duoneb  Supplemental 02, wean as able  Care per CTS

## 2021-07-18 NOTE — PROGRESS NOTE ADULT - ASSESSMENT
pt  is a 77y/o  female with PMHX of Current tobacco smoker on Nicotine patch , COPD , HTN and PVD.   Pt had recent stress test that was abnormal on 6/22/21    s/p cabg X2  Postop care  hyponatremia likely sec to hctz  improved  renal f/u  copd  pulm f/u noted  c/w outpt meds  PVD stable  PT  dvt proph   gi proph     Jovani Boyce MD pager [phone]

## 2021-07-18 NOTE — PROGRESS NOTE ADULT - SUBJECTIVE AND OBJECTIVE BOX
DATE OF SERVICE: 07-18-21 @ 07:01    Subjective: Patient seen and examined. No new events except as noted.     SUBJECTIVE/ROS:  No chest pain, dyspnea, palpitation, or dizziness.     MEDICATIONS:  MEDICATIONS  (STANDING):  albuterol/ipratropium for Nebulization 3 milliLiter(s) Nebulizer every 8 hours  aspirin enteric coated 81 milliGRAM(s) Oral daily  atorvastatin 40 milliGRAM(s) Oral at bedtime  chlorhexidine 2% Cloths 1 Application(s) Topical <User Schedule>  diltiazem    Tablet 30 milliGRAM(s) Oral every 6 hours  enoxaparin Injectable 40 milliGRAM(s) SubCutaneous daily  famotidine    Tablet 20 milliGRAM(s) Oral two times a day  fluticasone furoate/umeclidinium/vilanterol 100-62.5-25 MICROgram(s) Inhaler 1 Puff(s) Inhalation daily  gabapentin 300 milliGRAM(s) Oral every 12 hours  insulin lispro (ADMELOG) corrective regimen sliding scale   SubCutaneous three times a day before meals  insulin lispro (ADMELOG) corrective regimen sliding scale   SubCutaneous at bedtime  nicotine - 21 mG/24Hr(s) Patch 1 patch Transdermal daily  polyethylene glycol 3350 17 Gram(s) Oral daily  sodium chloride 0.9%. 1000 milliLiter(s) (10 mL/Hr) IV Continuous <Continuous>  tamsulosin 0.4 milliGRAM(s) Oral at bedtime      PHYSICAL EXAM:  T(C): 36.4 (07-18-21 @ 03:12), Max: 36.9 (07-17-21 @ 23:07)  HR: 72 (07-18-21 @ 03:12) (72 - 82)  BP: 121/61 (07-18-21 @ 03:12) (100/54 - 139/62)  RR: 18 (07-18-21 @ 03:12) (18 - 22)  SpO2: 88% (07-18-21 @ 03:12) (88% - 93%)  Wt(kg): --  I&O's Summary    17 Jul 2021 07:01  -  18 Jul 2021 07:00  --------------------------------------------------------  IN: 750 mL / OUT: 1190 mL / NET: -440 mL            JVP: Normal  Neck: supple  Lung: clear   CV: S1 S2 , Murmur:  Abd: soft  Ext: No edema  neuro: Awake / alert  Psych: flat affect  Skin: normal``    LABS/DATA:    CARDIAC MARKERS:  CARDIAC MARKERS ( 15 Jul 2021 13:14 )  x     / x     / 101 U/L / x     / 8.1 ng/mL                                11.9   18.00 )-----------( 122      ( 18 Jul 2021 04:20 )             35.5     07-18    132<L>  |  101  |  19  ----------------------------<  124<H>  5.1   |  19<L>  |  1.04    Ca    9.1      18 Jul 2021 04:20  Phos  2.3     07-18  Mg     2.3     07-18    TPro  5.8<L>  /  Alb  3.9  /  TBili  0.7  /  DBili  x   /  AST  24  /  ALT  18  /  AlkPhos  53  07-17    proBNP:   Lipid Profile:   HgA1c:   TSH:     TELE:  EKG:

## 2021-07-18 NOTE — PROGRESS NOTE ADULT - ASSESSMENT
CAD  Triple vessel disease   asa  statin   s/p CABG  plan as per CTS    Has mild CHF on imaging  can given lasix 20 IV once     PVD  surgery on hold for now given above     tobacco   counseling given   nicotine patch     COPD  plan as per pulm   nebs

## 2021-07-18 NOTE — PROGRESS NOTE ADULT - PROBLEM SELECTOR PLAN 2
Pulmonary following   Continue Trelegy 1 puff daily which pt has at bedside RN to bring down to pharmacy so can document in EMR that   Continue on Albuterol nebulizer q6h PRN  Continue on 6 Liters NC 02 placed on humidified air 02 SAT 88-89% --> Wean 02 NC for a 02 sat > 88%.

## 2021-07-18 NOTE — PROGRESS NOTE ADULT - SUBJECTIVE AND OBJECTIVE BOX
VITAL SIGNS    Telemetry:      Vital Signs Last 24 Hrs  T(C): 36.4 (21 @ 03:12), Max: 36.9 (21 @ 23:07)  T(F): 97.5 (21 @ 03:12), Max: 98.4 (21 @ 23:07)  HR: 72 (21 @ 03:12) (72 - 82)  BP: 121/61 (21 @ 03:12) (100/54 - 139/62)  RR: 18 (21 @ 03:12) (18 - 22)  SpO2: 88% (21 @ 03:12) (88% - 93%)                   Daily     Daily Weight in k.7 (2021 10:00)        CAPILLARY BLOOD GLUCOSE  132 (2021 11:30)  155 (2021 07:30)      POCT Blood Glucose.: 129 mg/dL (2021 21:15)  POCT Blood Glucose.: 159 mg/dL (2021 16:40)  POCT Blood Glucose.: 131 mg/dL (2021 11:35)  POCT Blood Glucose.: 155 mg/dL (2021 07:09)          Drains:     MS         [  ] Drainage:                 L Pleural  [  ]  Drainage:                R Pleural  [  ]  Drainage:    Pacing Wires        [  ]   Settings:                                  Isolated  [  ]    Coumadin    [ ] YES          [  ]      NO         Reason:                         PHYSICAL EXAM    Neurology: alert and oriented x 3, moves all extremities with no defecits  CV :  RRR  Sternal Wound :  CDI , Stable  Lungs:   CTA B/L  Abdomen: soft, nontender, nondistended, positive bowel sounds, last bowel movement   Extremities:                                            VITAL SIGNS    Telemetry:    sr   70-90    Vital Signs Last 24 Hrs  T(C): 36.4 (21 @ 03:12), Max: 36.9 (21 @ 23:07)  T(F): 97.5 (21 @ 03:12), Max: 98.4 (21 @ 23:07)  HR: 72 (21 @ 03:12) (72 - 82)  BP: 121/61 (21 @ 03:12) (100/54 - 139/62)  RR: 18 (21 @ 03:12) (18 - 22)  SpO2: 88% (21 @ 03:12) (88% - 93%)                   Daily     Daily Weight in k.7 (2021 10:00)        CAPILLARY BLOOD GLUCOSE  132 (2021 11:30)  155 (2021 07:30)      POCT Blood Glucose.: 129 mg/dL (2021 21:15)  POCT Blood Glucose.: 159 mg/dL (2021 16:40)  POCT Blood Glucose.: 131 mg/dL (2021 11:35)  POCT Blood Glucose.: 155 mg/dL (2021 07:09)                          PHYSICAL EXAM    Neurology: alert and oriented x 3, moves all extremities with no defecits  CV :  RRR  Sternal Wound :  CDI , Stable  Lungs:     diminished  bl throughout  Abdomen: soft, nontender, nondistended, positive bowel sounds, last bowel movement   preop  Extremities:     no edema  no calf tenderness

## 2021-07-18 NOTE — PROGRESS NOTE ADULT - ASSESSMENT
76 year old female current everyday 3ppd smoker on nicotine patch Hx COPD, PVD, CAD presents to [facility] for LHC, as preop testing prior to PAD procedure. S/p LHC revealing TVD, CT surgery consulted for CABG eval.     7/9/21 CT Surgery evaluation in progress. Preoperative work up in progress. Dr. Cole to review angiogram findings.   7/15/21  S/P  C2L w/ L radial harvest,ef 45  Acute blood loss anemia- prbc- basleline hct elevated from smoking hx- 2u intraop and post op  SB- pacing- now nsr  Hypoxia- copd --> Pulmonary following   Cardizem for radial artery graft  7/16 Transferred to SDU on 6 Liters NC 02.   7/17 VVS; PW x 4 D/C'd.  Right IJ D/C'D and MS CT removed.  Continue on 6 Liters NC 02 placed on humidified air 02 SAT 88-89%.  Pulmonary following --> continue on home inhaler Trelegy inh 1 puff daily and Douneb neb Q6. No BB 2/2 severe COPD with hypoxemia.    7/19 76 year old female current everyday 3ppd smoker on nicotine patch Hx COPD, PVD, CAD presents to [facility] for LHC, as preop testing prior to PAD procedure. S/p LHC revealing TVD, CT surgery consulted for CABG eval.     7/9/21 CT Surgery evaluation in progress. Preoperative work up in progress. Dr. Cole to review angiogram findings.   7/15/21  S/P  C2L w/ L radial harvest,ef 45  Acute blood loss anemia- prbc- basleline hct elevated from smoking hx- 2u intraop and post op  SB- pacing- now nsr  Hypoxia- copd --> Pulmonary following   Cardizem for radial artery graft  7/16 Transferred to SDU on 6 Liters NC 02.   7/17 VVS; PW x 4 D/C'd.  Right IJ D/C'D and MS CT removed.  Continue on 6 Liters NC 02 placed on humidified air 02 SAT 88-89%.  Pulmonary following --> continue on home inhaler Trelegy inh 1 puff daily and Douneb neb Q6. No BB 2/2 severe COPD with hypoxemia.    7/19  5 l NC  sats 90 percent   OOB ambulating  neg  540 cc   wbc 18  no ss infection

## 2021-07-19 LAB
ANION GAP SERPL CALC-SCNC: 11 MMOL/L — SIGNIFICANT CHANGE UP (ref 5–17)
BUN SERPL-MCNC: 19 MG/DL — SIGNIFICANT CHANGE UP (ref 7–23)
CALCIUM SERPL-MCNC: 9.5 MG/DL — SIGNIFICANT CHANGE UP (ref 8.4–10.5)
CHLORIDE SERPL-SCNC: 101 MMOL/L — SIGNIFICANT CHANGE UP (ref 96–108)
CO2 SERPL-SCNC: 22 MMOL/L — SIGNIFICANT CHANGE UP (ref 22–31)
CREAT SERPL-MCNC: 1.02 MG/DL — SIGNIFICANT CHANGE UP (ref 0.5–1.3)
GLUCOSE BLDC GLUCOMTR-MCNC: 101 MG/DL — HIGH (ref 70–99)
GLUCOSE BLDC GLUCOMTR-MCNC: 112 MG/DL — HIGH (ref 70–99)
GLUCOSE SERPL-MCNC: 114 MG/DL — HIGH (ref 70–99)
HCT VFR BLD CALC: 38.2 % — SIGNIFICANT CHANGE UP (ref 34.5–45)
HGB BLD-MCNC: 13 G/DL — SIGNIFICANT CHANGE UP (ref 11.5–15.5)
MAGNESIUM SERPL-MCNC: 2.2 MG/DL — SIGNIFICANT CHANGE UP (ref 1.6–2.6)
MCHC RBC-ENTMCNC: 31 PG — SIGNIFICANT CHANGE UP (ref 27–34)
MCHC RBC-ENTMCNC: 34 GM/DL — SIGNIFICANT CHANGE UP (ref 32–36)
MCV RBC AUTO: 91.2 FL — SIGNIFICANT CHANGE UP (ref 80–100)
NRBC # BLD: 0 /100 WBCS — SIGNIFICANT CHANGE UP (ref 0–0)
PHOSPHATE SERPL-MCNC: 2.7 MG/DL — SIGNIFICANT CHANGE UP (ref 2.5–4.5)
PLATELET # BLD AUTO: 145 K/UL — LOW (ref 150–400)
POTASSIUM SERPL-MCNC: 4.6 MMOL/L — SIGNIFICANT CHANGE UP (ref 3.5–5.3)
POTASSIUM SERPL-SCNC: 4.6 MMOL/L — SIGNIFICANT CHANGE UP (ref 3.5–5.3)
RBC # BLD: 4.19 M/UL — SIGNIFICANT CHANGE UP (ref 3.8–5.2)
RBC # FLD: 15.6 % — HIGH (ref 10.3–14.5)
SODIUM SERPL-SCNC: 134 MMOL/L — LOW (ref 135–145)
WBC # BLD: 15.21 K/UL — HIGH (ref 3.8–10.5)
WBC # FLD AUTO: 15.21 K/UL — HIGH (ref 3.8–10.5)

## 2021-07-19 PROCEDURE — 99232 SBSQ HOSP IP/OBS MODERATE 35: CPT

## 2021-07-19 RX ORDER — SENNA PLUS 8.6 MG/1
2 TABLET ORAL AT BEDTIME
Refills: 0 | Status: DISCONTINUED | OUTPATIENT
Start: 2021-07-19 | End: 2021-07-22

## 2021-07-19 RX ORDER — SPIRONOLACTONE 25 MG/1
25 TABLET, FILM COATED ORAL DAILY
Refills: 0 | Status: DISCONTINUED | OUTPATIENT
Start: 2021-07-19 | End: 2021-07-19

## 2021-07-19 RX ORDER — ACETAMINOPHEN 500 MG
650 TABLET ORAL EVERY 6 HOURS
Refills: 0 | Status: DISCONTINUED | OUTPATIENT
Start: 2021-07-19 | End: 2021-07-22

## 2021-07-19 RX ORDER — FUROSEMIDE 40 MG
40 TABLET ORAL DAILY
Refills: 0 | Status: DISCONTINUED | OUTPATIENT
Start: 2021-07-19 | End: 2021-07-22

## 2021-07-19 RX ORDER — SORBITOL SOLUTION 70 %
30 SOLUTION, ORAL MISCELLANEOUS ONCE
Refills: 0 | Status: COMPLETED | OUTPATIENT
Start: 2021-07-19 | End: 2021-07-19

## 2021-07-19 RX ADMIN — CHLORHEXIDINE GLUCONATE 1 APPLICATION(S): 213 SOLUTION TOPICAL at 08:33

## 2021-07-19 RX ADMIN — Medication 81 MILLIGRAM(S): at 11:32

## 2021-07-19 RX ADMIN — GABAPENTIN 300 MILLIGRAM(S): 400 CAPSULE ORAL at 06:36

## 2021-07-19 RX ADMIN — Medication 30 MILLIGRAM(S): at 14:10

## 2021-07-19 RX ADMIN — FAMOTIDINE 20 MILLIGRAM(S): 10 INJECTION INTRAVENOUS at 17:51

## 2021-07-19 RX ADMIN — FLUTICASONE FUROATE, UMECLIDINIUM BROMIDE AND VILANTEROL TRIFENATATE 1 PUFF(S): 200; 62.5; 25 POWDER RESPIRATORY (INHALATION) at 14:19

## 2021-07-19 RX ADMIN — Medication 1 PATCH: at 11:31

## 2021-07-19 RX ADMIN — Medication 650 MILLIGRAM(S): at 22:36

## 2021-07-19 RX ADMIN — FAMOTIDINE 20 MILLIGRAM(S): 10 INJECTION INTRAVENOUS at 06:36

## 2021-07-19 RX ADMIN — POLYETHYLENE GLYCOL 3350 17 GRAM(S): 17 POWDER, FOR SOLUTION ORAL at 11:32

## 2021-07-19 RX ADMIN — SPIRONOLACTONE 25 MILLIGRAM(S): 25 TABLET, FILM COATED ORAL at 09:14

## 2021-07-19 RX ADMIN — ATORVASTATIN CALCIUM 40 MILLIGRAM(S): 80 TABLET, FILM COATED ORAL at 21:06

## 2021-07-19 RX ADMIN — Medication 30 MILLIGRAM(S): at 08:32

## 2021-07-19 RX ADMIN — Medication 3 MILLILITER(S): at 14:10

## 2021-07-19 RX ADMIN — ENOXAPARIN SODIUM 40 MILLIGRAM(S): 100 INJECTION SUBCUTANEOUS at 11:32

## 2021-07-19 RX ADMIN — Medication 1 PATCH: at 11:30

## 2021-07-19 RX ADMIN — Medication 650 MILLIGRAM(S): at 22:07

## 2021-07-19 RX ADMIN — Medication 40 MILLIGRAM(S): at 11:31

## 2021-07-19 RX ADMIN — Medication 30 MILLILITER(S): at 09:15

## 2021-07-19 RX ADMIN — GABAPENTIN 300 MILLIGRAM(S): 400 CAPSULE ORAL at 17:51

## 2021-07-19 RX ADMIN — Medication 3 MILLILITER(S): at 21:06

## 2021-07-19 RX ADMIN — Medication 30 MILLIGRAM(S): at 19:33

## 2021-07-19 RX ADMIN — Medication 1 PATCH: at 17:54

## 2021-07-19 RX ADMIN — Medication 1 PATCH: at 08:35

## 2021-07-19 RX ADMIN — Medication 30 MILLIGRAM(S): at 01:17

## 2021-07-19 RX ADMIN — TAMSULOSIN HYDROCHLORIDE 0.4 MILLIGRAM(S): 0.4 CAPSULE ORAL at 21:06

## 2021-07-19 RX ADMIN — Medication 3 MILLILITER(S): at 06:37

## 2021-07-19 RX ADMIN — Medication 40 MILLIGRAM(S): at 09:15

## 2021-07-19 NOTE — PROGRESS NOTE ADULT - SUBJECTIVE AND OBJECTIVE BOX
DATE OF SERVICE: 07-19-21 @ 10:13    Subjective: Patient seen and examined. No new events except as noted.     SUBJECTIVE/ROS:  feels ok   no cp   no sob   she is hypoxic       MEDICATIONS:  MEDICATIONS  (STANDING):  albuterol/ipratropium for Nebulization 3 milliLiter(s) Nebulizer every 8 hours  aspirin enteric coated 81 milliGRAM(s) Oral daily  atorvastatin 40 milliGRAM(s) Oral at bedtime  chlorhexidine 2% Cloths 1 Application(s) Topical <User Schedule>  diltiazem    Tablet 30 milliGRAM(s) Oral every 6 hours  enoxaparin Injectable 40 milliGRAM(s) SubCutaneous daily  famotidine    Tablet 20 milliGRAM(s) Oral two times a day  fluticasone furoate/umeclidinium/vilanterol 100-62.5-25 MICROgram(s) Inhaler 1 Puff(s) Inhalation daily  furosemide    Tablet 40 milliGRAM(s) Oral daily  gabapentin 300 milliGRAM(s) Oral every 12 hours  insulin lispro (ADMELOG) corrective regimen sliding scale   SubCutaneous three times a day before meals  insulin lispro (ADMELOG) corrective regimen sliding scale   SubCutaneous at bedtime  nicotine - 21 mG/24Hr(s) Patch 1 patch Transdermal daily  polyethylene glycol 3350 17 Gram(s) Oral daily  predniSONE   Tablet 40 milliGRAM(s) Oral daily  senna 2 Tablet(s) Oral at bedtime  sodium chloride 0.9%. 1000 milliLiter(s) (10 mL/Hr) IV Continuous <Continuous>  spironolactone 25 milliGRAM(s) Oral daily  tamsulosin 0.4 milliGRAM(s) Oral at bedtime      PHYSICAL EXAM:  T(C): 36.8 (07-19-21 @ 07:00), Max: 36.9 (07-18-21 @ 15:05)  HR: 101 (07-19-21 @ 08:00) (68 - 101)  BP: 121/61 (07-19-21 @ 07:00) (109/59 - 124/60)  RR: 18 (07-19-21 @ 07:00) (18 - 18)  SpO2: 94% (07-19-21 @ 08:00) (90% - 95%)  Wt(kg): --  I&O's Summary    18 Jul 2021 07:01  -  19 Jul 2021 07:00  --------------------------------------------------------  IN: 1050 mL / OUT: 2050 mL / NET: -1000 mL    19 Jul 2021 07:01  -  19 Jul 2021 10:13  --------------------------------------------------------  IN: 120 mL / OUT: 500 mL / NET: -380 mL            JVP: Normal  Neck: supple  Lung: clear   CV: S1 S2 , Murmur:  Abd: soft  Ext: No edema  neuro: Awake / alert  Psych: flat affect  Skin: normal``    LABS/DATA:    CARDIAC MARKERS:                                13.0   15.21 )-----------( 145      ( 19 Jul 2021 03:44 )             38.2     07-19    134<L>  |  101  |  19  ----------------------------<  114<H>  4.6   |  22  |  1.02    Ca    9.5      19 Jul 2021 03:44  Phos  2.7     07-19  Mg     2.2     07-19      proBNP:   Lipid Profile:   HgA1c:   TSH:     TELE:  EKG:

## 2021-07-19 NOTE — PROGRESS NOTE ADULT - ASSESSMENT
pt  is a 77y/o  female with PMHX of Current tobacco smoker on Nicotine patch , COPD , HTN and PVD.   Pt had recent stress test that was abnormal on 6/22/21    s/p cabg X2  Postop care  hyponatremia likely sec to hctz  improved  renal f/u  copd  pulm f/u noted  c/w outpt meds  PVD stable  PT  smoking cessation  dvt proph   gi proph     Jovani Boyce MD pager [phone]

## 2021-07-19 NOTE — PROGRESS NOTE ADULT - ATTENDING COMMENTS
as above- s/p OHS 7/15- on NC O2--evidence of hemoptysis/bronchospasm  Multifactorial dyspnea--COPD-CB/emphysema (pt was seen by Dr. Montelongo in the office on 7/8   PFT showed a normal FEV1, TLC, DLCO of 68), CAD, debility--keep sat above 90%  COPD/CB/emphysejma--trelegy 1 q day, incentive spirometry, acapella, albuterol q 6; now w/ flair of copd vs ?CHF-***-add prednisone 40mg per day               if CTS agrees  nicotine addiction-- smoking cessation advised.  lung CA screening---LDCT was to be done, will need f/up ct in 6 months  CAD s/p OHS 7/15; CT as per CTS- check BNP      DVT and GI prophylaxis     renal f/up    Isrrael Klein MD-Pulmonary   [phone]

## 2021-07-19 NOTE — PROGRESS NOTE ADULT - ASSESSMENT
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 Pulmonology consulted for possible COPD. Pt now on 2L O2 but may not be needed, not currently in exacerbation.     Problems:  COPD        Recommendation  - Continue trelegy 1 puff daily which pt has at bedside, please have RN bring down to pharmacy so can document in EMR that pt is actually taking  - Albuterol nebulizer q6h PRN  - Maintain O2 saturation > 88%, if she does not desaturate with ambulation then no need for O2/ acapella/incentive spirometry  *****************  7/13-mucusy in am--surgery 7/15                           (see below)  7/14-better today  7/15-ready for surgery-no new resp sx.  7/16-s/p OHS-extubated to NC  7/19-more cough/wheeze and hemoptysis

## 2021-07-19 NOTE — PROGRESS NOTE ADULT - SUBJECTIVE AND OBJECTIVE BOX
NEPHROLOGY-[facility] [phone]        Patient seen and examined         MEDICATIONS  (STANDING):  albuterol/ipratropium for Nebulization 3 milliLiter(s) Nebulizer every 8 hours  aspirin enteric coated 81 milliGRAM(s) Oral daily  atorvastatin 40 milliGRAM(s) Oral at bedtime  chlorhexidine 2% Cloths 1 Application(s) Topical <User Schedule>  diltiazem    Tablet 30 milliGRAM(s) Oral every 6 hours  enoxaparin Injectable 40 milliGRAM(s) SubCutaneous daily  famotidine    Tablet 20 milliGRAM(s) Oral two times a day  fluticasone furoate/umeclidinium/vilanterol 100-62.5-25 MICROgram(s) Inhaler 1 Puff(s) Inhalation daily  furosemide    Tablet 40 milliGRAM(s) Oral daily  gabapentin 300 milliGRAM(s) Oral every 12 hours  insulin lispro (ADMELOG) corrective regimen sliding scale   SubCutaneous three times a day before meals  insulin lispro (ADMELOG) corrective regimen sliding scale   SubCutaneous at bedtime  nicotine - 21 mG/24Hr(s) Patch 1 patch Transdermal daily  polyethylene glycol 3350 17 Gram(s) Oral daily  predniSONE   Tablet 40 milliGRAM(s) Oral daily  senna 2 Tablet(s) Oral at bedtime  sodium chloride 0.9%. 1000 milliLiter(s) (10 mL/Hr) IV Continuous <Continuous>  sorbitol 70% Solution 30 milliLiter(s) Oral once  spironolactone 25 milliGRAM(s) Oral daily  tamsulosin 0.4 milliGRAM(s) Oral at bedtime      VITAL:  T(C): , Max: 36.9 (07-18-21 @ 15:05)  T(F): , Max: 98.4 (07-18-21 @ 15:05)  HR: 91 (07-19-21 @ 07:00)  BP: 121/61 (07-19-21 @ 07:00)  BP(mean): 82 (07-19-21 @ 07:00)  RR: 18 (07-19-21 @ 07:00)  SpO2: 91% (07-19-21 @ 07:00)  Wt(kg): --    I and O's:    07-18 @ 07:01  -  07-19 @ 07:00  --------------------------------------------------------  IN: 1050 mL / OUT: 2050 mL / NET: -1000 mL          PHYSICAL EXAM:    Constitutional: NAD  Neck:  No JVD  Respiratory: CTAB/L  Cardiovascular: S1 and S2  Gastrointestinal: BS+, soft, NT/ND  Extremities: No peripheral edema  Neurological: A/O x 3, no focal deficits  Psychiatric: Normal mood, normal affect  : No Levy  Skin: No rashes  Access: Not applicable    LABS:                        13.0   15.21 )-----------( 145      ( 19 Jul 2021 03:44 )             38.2     07-19    134<L>  |  101  |  19  ----------------------------<  114<H>  4.6   |  22  |  1.02    Ca    9.5      19 Jul 2021 03:44  Phos  2.7     07-19  Mg     2.2     07-19            Urine Studies:          RADIOLOGY & ADDITIONAL STUDIES:             NEPHROLOGY-[facility] [phone]        Patient seen and examined in bed.  She had an uneventful night         MEDICATIONS  (STANDING):  albuterol/ipratropium for Nebulization 3 milliLiter(s) Nebulizer every 8 hours  aspirin enteric coated 81 milliGRAM(s) Oral daily  atorvastatin 40 milliGRAM(s) Oral at bedtime  chlorhexidine 2% Cloths 1 Application(s) Topical <User Schedule>  diltiazem    Tablet 30 milliGRAM(s) Oral every 6 hours  enoxaparin Injectable 40 milliGRAM(s) SubCutaneous daily  famotidine    Tablet 20 milliGRAM(s) Oral two times a day  fluticasone furoate/umeclidinium/vilanterol 100-62.5-25 MICROgram(s) Inhaler 1 Puff(s) Inhalation daily  furosemide    Tablet 40 milliGRAM(s) Oral daily  gabapentin 300 milliGRAM(s) Oral every 12 hours  insulin lispro (ADMELOG) corrective regimen sliding scale   SubCutaneous three times a day before meals  insulin lispro (ADMELOG) corrective regimen sliding scale   SubCutaneous at bedtime  nicotine - 21 mG/24Hr(s) Patch 1 patch Transdermal daily  polyethylene glycol 3350 17 Gram(s) Oral daily  predniSONE   Tablet 40 milliGRAM(s) Oral daily  senna 2 Tablet(s) Oral at bedtime  sodium chloride 0.9%. 1000 milliLiter(s) (10 mL/Hr) IV Continuous <Continuous>  sorbitol 70% Solution 30 milliLiter(s) Oral once  spironolactone 25 milliGRAM(s) Oral daily  tamsulosin 0.4 milliGRAM(s) Oral at bedtime      VITAL:  T(C): , Max: 36.9 (07-18-21 @ 15:05)  T(F): , Max: 98.4 (07-18-21 @ 15:05)  HR: 91 (07-19-21 @ 07:00)  BP: 121/61 (07-19-21 @ 07:00)  BP(mean): 82 (07-19-21 @ 07:00)  RR: 18 (07-19-21 @ 07:00)  SpO2: 91% (07-19-21 @ 07:00)  Wt(kg): --    I and O's:    07-18 @ 07:01  -  07-19 @ 07:00  --------------------------------------------------------  IN: 1050 mL / OUT: 2050 mL / NET: -1000 mL          PHYSICAL EXAM:    Constitutional: NAD  Neck:  No JVD  Respiratory: reduced breath sounds   Cardiovascular: S1 and S2  Gastrointestinal: BS+, soft, NT/ND  Extremities: No peripheral edema  Neurological: A/O x 3, no focal deficits  Psychiatric: Normal mood, normal affect  : No Levy  Skin: No rashes  Access: Not applicable    LABS:                        13.0   15.21 )-----------( 145      ( 19 Jul 2021 03:44 )             38.2     07-19    134<L>  |  101  |  19  ----------------------------<  114<H>  4.6   |  22  |  1.02    Ca    9.5      19 Jul 2021 03:44  Phos  2.7     07-19  Mg     2.2     07-19            Urine Studies:          RADIOLOGY & ADDITIONAL STUDIES:        < from: Xray Chest 1 View- PORTABLE-Routine (07.18.21 @ 06:39) >    EXAM:  XR CHEST PORTABLE ROUTINE 1V                          EXAM:  XR CHEST PORTABLE ROUTINE 1V                          EXAM:  XR CHEST PORTABLE URGENT 1V                            PROCEDURE DATE:  07/17/2021            INTERPRETATION:  History:Status post cardiothoracic surgery.    Radiographs of the chest were performed on July 17, 2021 at 0459 hours, July 17, 2021 at 1056 hours, and July 18, 2021 at 0637 hours.    Comparison is made to previous radiograph of the chest from July 16, 2021.    IMPRESSION:    July 17, 2021 at 0459 hours: There is a right internal jugular introducer sheath in place with tip in the SVC. Surgical clips are seen within the mediastinum. There is a mediastinal drain. The patient is status post sternotomy. Diffuse reticular opacities are seen throughout both lungs with a basilar predominance. Trace bilateral pleural effusions are noted. These are grossly unchanged. There is subcutaneous emphysema overlying the left chest wall, unchanged. Heart size is within normal limits.    July 17, 2021 at 1056 hours: There is interval removal of the right internal jugular central venous catheter. Subcutaneous emphysema over the left chest wall is again noted and unchanged. There are unchanged reticular opacities within both lungs with basilar predominance. There are unchanged trace bilateral pleural effusions.    July 18, 2021 at 0637 hours: The degree of subcutaneous emphysema overlying the left chest wall is mildly decreased compared to the prior examination. Other findings grossly unchanged.    --- End of Report ---                REJI DILLON MD; Attending Radiologist  This document has been electronically signed. Jul 18 2021  1:36PM    < end of copied text >

## 2021-07-19 NOTE — PROVIDER CONTACT NOTE (OTHER) - SITUATION
Patient agitated
patient hypotensive (78/42)
Patient had 2.4 sec run of PAT in 250's on tele.
Patient experienced oxygen desaturation

## 2021-07-19 NOTE — PROVIDER CONTACT NOTE (OTHER) - ASSESSMENT
HR BP . Patient asymptomatic, resting in bed. No c/o of palpitations, chest pain, or SOB. Patient reported she was ambulating from the bathroom when event occurred. /67 and HR 85. Patient asymptomatic, resting in bed. No c/o of palpitations, chest pain, or SOB. Patient reported she was ambulating from the bathroom when event occurred.

## 2021-07-19 NOTE — PROVIDER CONTACT NOTE (OTHER) - ACTION/TREATMENT ORDERED:
PA will follow up. Continue to monitor.
per SERGIO Frye, administer a 500cc bolus of normal saline over 30 min and reassess blood pressure post infusion.
Place patient on continuous pulse oximetry. Continue to monitor.
NP Bryson Mancia made aware. Cardizem 30mg given. Will continue to monitor patient.

## 2021-07-19 NOTE — PROGRESS NOTE ADULT - SUBJECTIVE AND OBJECTIVE BOX
Patient is a 76y old  Female who presents with a chief complaint of sob (19 Jul 2021 06:12)    Coverage for Dr. Michel Strickland   SUBJECTIVE / OVERNIGHT EVENTS: Comfortable without new complaints. Daughters at bedside.  Review of Systems  chest pain no  palpitations no  sob no  nausea no  headache no    MEDICATIONS  (STANDING):  albuterol/ipratropium for Nebulization 3 milliLiter(s) Nebulizer every 8 hours  aspirin enteric coated 81 milliGRAM(s) Oral daily  atorvastatin 40 milliGRAM(s) Oral at bedtime  bisacodyl Suppository 10 milliGRAM(s) Rectal once  chlorhexidine 2% Cloths 1 Application(s) Topical <User Schedule>  diltiazem    Tablet 30 milliGRAM(s) Oral every 6 hours  enoxaparin Injectable 40 milliGRAM(s) SubCutaneous daily  famotidine    Tablet 20 milliGRAM(s) Oral two times a day  fluticasone furoate/umeclidinium/vilanterol 100-62.5-25 MICROgram(s) Inhaler 1 Puff(s) Inhalation daily  furosemide    Tablet 40 milliGRAM(s) Oral daily  gabapentin 300 milliGRAM(s) Oral every 12 hours  nicotine - 21 mG/24Hr(s) Patch 1 patch Transdermal daily  polyethylene glycol 3350 17 Gram(s) Oral daily  predniSONE   Tablet 40 milliGRAM(s) Oral daily  senna 2 Tablet(s) Oral at bedtime  sodium chloride 0.9%. 1000 milliLiter(s) (10 mL/Hr) IV Continuous <Continuous>  tamsulosin 0.4 milliGRAM(s) Oral at bedtime    MEDICATIONS  (PRN):  oxycodone    5 mG/acetaminophen 325 mG 1 Tablet(s) Oral every 6 hours PRN Moderate Pain (4 - 6)  oxycodone    5 mG/acetaminophen 325 mG 2 Tablet(s) Oral every 6 hours PRN Severe Pain (7 - 10)      Vital Signs Last 24 Hrs  T(C): 36.7 (19 Jul 2021 11:09), Max: 36.9 (18 Jul 2021 23:16)  T(F): 98.1 (19 Jul 2021 11:09), Max: 98.4 (18 Jul 2021 23:16)  HR: 98 (19 Jul 2021 14:12) (68 - 101)  BP: 123/79 (19 Jul 2021 14:12) (110/71 - 139/63)  BP(mean): 86 (19 Jul 2021 12:50) (74 - 86)  RR: 18 (19 Jul 2021 14:12) (18 - 18)  SpO2: 100% (19 Jul 2021 14:12) (90% - 100%)    PHYSICAL EXAM:  GENERAL: NAD   HEAD:  Atraumatic, Normocephalic  EYES: EOMI, PERRLA, conjunctiva and sclera clear  NECK: Supple, No JVD  CHEST/LUNG: Clear to auscultation bilaterally; No wheeze Sternal wound with clean dressing.  HEART: Regular rate and rhythm; No murmurs, rubs, or gallops  ABDOMEN: Soft, Nontender, Nondistended; Bowel sounds present   EXTREMITIES:  2+ Peripheral Pulses, No clubbing, cyanosis, or edema   PSYCH: AAOx3   NEUROLOGY: non-focal  SKIN: No rashes or lesions    LABS:                        13.0   15.21 )-----------( 145      ( 19 Jul 2021 03:44 )             38.2     07-19    134<L>  |  101  |  19  ----------------------------<  114<H>  4.6   |  22  |  1.02    Ca    9.5      19 Jul 2021 03:44  Phos  2.7     07-19  Mg     2.2     07-19                  RADIOLOGY & ADDITIONAL TESTS:    Imaging Personally Reviewed:    Consultant(s) Notes Reviewed:      Care Discussed with Consultants/Other Providers:

## 2021-07-19 NOTE — PROGRESS NOTE ADULT - ASSESSMENT
77y/o  female with COPD , HTN and PVD recent stress test that was abnormal on 6/22/21 now s/p CABG on 7/15     Hyponatremia(mild now)- secondary to telmisartan/HCTZ use - now discontinued,  , TSH normal, cortisol low   Non oliguric ZAC -  renal fxn resolving  s/p CABG on 7/15    RECOMMEND:  No indication  for salt tablets for now    Monitor Cr/NA  for now        75y/o  female with COPD , HTN and PVD recent stress test that was abnormal on 6/22/21 now s/p CABG on 7/15     Hyponatremia(mild now)- secondary to telmisartan/HCTZ use - now discontinued,  , TSH normal, cortisol low   Non oliguric ZAC -  renal fxn resolving  s/p CABG on 7/15    RECOMMEND:  No indication  for salt tablets for now    Monitor Cr/NA  for now   Encourage protein intake     Sayed [facility]   [phone]

## 2021-07-19 NOTE — PROVIDER CONTACT NOTE (OTHER) - REASON
Patient agitated
Patient experienced oxygen desaturation
patient hypotensive (78/42)
Patient had 2.4 sec run of PAT in 250's on tele

## 2021-07-19 NOTE — PROGRESS NOTE ADULT - SUBJECTIVE AND OBJECTIVE BOX
CHIEF COMPLAINT: f/up sob, preop resp, copd--cough w/ hemoptysis  and wheezing    Interval Events: nephro/cards f/up; s/p OHS 7/15    REVIEW OF SYSTEMS:  Constitutional: No fevers or chills. No weight loss. + fatigue or generalized malaise.  Eyes: No itching or discharge from the eyes  ENT: No ear pain. No ear discharge. No nasal congestion. No post nasal drip. No epistaxis. No throat pain. No sore throat. No difficulty swallowing.   CV: No chest pain. No palpitations. No lightheadedness or dizziness.   Resp: No dyspnea at rest. + dyspnea on exertion. No orthopnea. + wheezing. + cough. No stridor. No sputum production. No chest pain with respiration.  GI: No nausea. No vomiting. No diarrhea.  MSK: No joint pain or pain in any extremities  Integumentary: No skin lesions. No pedal edema.  Neurological: No gross motor weakness. No sensory changes.  [+ ] All other systems negative  [ ] Unable to assess ROS because ________    OBJECTIVE:  ICU Vital Signs Last 24 Hrs  T(C): 36.9 (19 Jul 2021 03:38), Max: 36.9 (18 Jul 2021 15:05)  T(F): 98.4 (19 Jul 2021 03:38), Max: 98.4 (18 Jul 2021 15:05)  HR: 73 (19 Jul 2021 03:38) (68 - 81)  BP: 124/60 (19 Jul 2021 03:38) (109/58 - 124/60)  BP(mean): 86 (19 Jul 2021 03:38) (74 - 86)  ABP: --  ABP(mean): --  RR: 18 (19 Jul 2021 03:38) (18 - 18)  SpO2: 92% (19 Jul 2021 03:38) (89% - 95%)        07-17 @ 07:01  -  07-18 @ 07:00  --------------------------------------------------------  IN: 750 mL / OUT: 1190 mL / NET: -440 mL    07-18 @ 07:01  -  07-19 @ 06:12  --------------------------------------------------------  IN: 950 mL / OUT: 2050 mL / NET: -1100 mL      CAPILLARY BLOOD GLUCOSE  159 (18 Jul 2021 11:47)      POCT Blood Glucose.: 130 mg/dL (18 Jul 2021 21:39)      PHYSICAL EXAM:  General: Awake, alert, oriented X 3.   HEENT: Atraumatic, normocephalic.                 Mallampatti Grade 2                No nasal congestion.                No tonsillar or pharyngeal exudates.  Lymph Nodes: No palpable lymphadenopathy  Neck: No JVD. No carotid bruit.   Respiratory: Normal chest expansion                         Normal percussion                         Normal and equal air entry                         + exp wheezeand rhonchi but no rales.  Cardiovascular: S1 S2 normal. No murmurs, rubs or gallops.   Abdomen: Soft, non-tender, non-distended. No organomegaly. Normoactive bowel sounds.  Extremities: Warm to touch. Peripheral pulse palpable. No pedal edema.   Skin: No rashes or skin lesions  Neurological: Motor and sensory examination equal and normal in all four extremities.  Psychiatry: Appropriate mood and affect.    HOSPITAL MEDICATIONS:  MEDICATIONS  (STANDING):  albuterol/ipratropium for Nebulization 3 milliLiter(s) Nebulizer every 8 hours  aspirin enteric coated 81 milliGRAM(s) Oral daily  atorvastatin 40 milliGRAM(s) Oral at bedtime  chlorhexidine 2% Cloths 1 Application(s) Topical <User Schedule>  diltiazem    Tablet 30 milliGRAM(s) Oral every 6 hours  enoxaparin Injectable 40 milliGRAM(s) SubCutaneous daily  famotidine    Tablet 20 milliGRAM(s) Oral two times a day  fluticasone furoate/umeclidinium/vilanterol 100-62.5-25 MICROgram(s) Inhaler 1 Puff(s) Inhalation daily  gabapentin 300 milliGRAM(s) Oral every 12 hours  insulin lispro (ADMELOG) corrective regimen sliding scale   SubCutaneous three times a day before meals  insulin lispro (ADMELOG) corrective regimen sliding scale   SubCutaneous at bedtime  nicotine - 21 mG/24Hr(s) Patch 1 patch Transdermal daily  polyethylene glycol 3350 17 Gram(s) Oral daily  sodium chloride 0.9%. 1000 milliLiter(s) (10 mL/Hr) IV Continuous <Continuous>  tamsulosin 0.4 milliGRAM(s) Oral at bedtime    MEDICATIONS  (PRN):  oxycodone    5 mG/acetaminophen 325 mG 1 Tablet(s) Oral every 6 hours PRN Moderate Pain (4 - 6)  oxycodone    5 mG/acetaminophen 325 mG 2 Tablet(s) Oral every 6 hours PRN Severe Pain (7 - 10)      LABS:                        13.0   15.21 )-----------( 145      ( 19 Jul 2021 03:44 )             38.2     07-19    134<L>  |  101  |  19  ----------------------------<  114<H>  4.6   |  22  |  1.02    Ca    9.5      19 Jul 2021 03:44  Phos  2.7     07-19  Mg     2.2     07-19                MICROBIOLOGY:     RADIOLOGY:  [ ] Reviewed and interpreted by me    Point of Care Ultrasound Findings:    PFT:    EKG:

## 2021-07-19 NOTE — PROVIDER CONTACT NOTE (OTHER) - BACKGROUND
Patient s/p C2L on 7/15. Patient is on Cardizem 30 mg q6hrs.
Patient admitted with abnormal cardiovascular function
Patient admitted with abnormal cardiac function.
patient admitted for Other nonspecific abnormal cardiovascular system function test

## 2021-07-19 NOTE — PROGRESS NOTE ADULT - ASSESSMENT
CAD  Triple vessel disease   asa  statin   s/p CABG  plan as per CTS    Has mild CHF on imaging  can given lasix 20 IV once     PVD  surgery on hold for now given above     tobacco   counseling given   nicotine patch     COPD  plan as per pulm , input appreciated   nebs   on steroids now for hypoxia

## 2021-07-20 LAB
ALBUMIN SERPL ELPH-MCNC: 3.1 G/DL — LOW (ref 3.3–5)
ALP SERPL-CCNC: 92 U/L — SIGNIFICANT CHANGE UP (ref 40–120)
ALT FLD-CCNC: 46 U/L — HIGH (ref 10–45)
ANION GAP SERPL CALC-SCNC: 13 MMOL/L — SIGNIFICANT CHANGE UP (ref 5–17)
AST SERPL-CCNC: 32 U/L — SIGNIFICANT CHANGE UP (ref 10–40)
BILIRUB SERPL-MCNC: 0.9 MG/DL — SIGNIFICANT CHANGE UP (ref 0.2–1.2)
BUN SERPL-MCNC: 21 MG/DL — SIGNIFICANT CHANGE UP (ref 7–23)
CALCIUM SERPL-MCNC: 9.1 MG/DL — SIGNIFICANT CHANGE UP (ref 8.4–10.5)
CHLORIDE SERPL-SCNC: 96 MMOL/L — SIGNIFICANT CHANGE UP (ref 96–108)
CO2 SERPL-SCNC: 23 MMOL/L — SIGNIFICANT CHANGE UP (ref 22–31)
CREAT SERPL-MCNC: 1.02 MG/DL — SIGNIFICANT CHANGE UP (ref 0.5–1.3)
GLUCOSE SERPL-MCNC: 120 MG/DL — HIGH (ref 70–99)
HCT VFR BLD CALC: 35.8 % — SIGNIFICANT CHANGE UP (ref 34.5–45)
HGB BLD-MCNC: 12.1 G/DL — SIGNIFICANT CHANGE UP (ref 11.5–15.5)
MAGNESIUM SERPL-MCNC: 2 MG/DL — SIGNIFICANT CHANGE UP (ref 1.6–2.6)
MCHC RBC-ENTMCNC: 29.8 PG — SIGNIFICANT CHANGE UP (ref 27–34)
MCHC RBC-ENTMCNC: 33.8 GM/DL — SIGNIFICANT CHANGE UP (ref 32–36)
MCV RBC AUTO: 88.2 FL — SIGNIFICANT CHANGE UP (ref 80–100)
NRBC # BLD: 0 /100 WBCS — SIGNIFICANT CHANGE UP (ref 0–0)
PHOSPHATE SERPL-MCNC: 2.6 MG/DL — SIGNIFICANT CHANGE UP (ref 2.5–4.5)
PLATELET # BLD AUTO: 153 K/UL — SIGNIFICANT CHANGE UP (ref 150–400)
POTASSIUM SERPL-MCNC: 3.8 MMOL/L — SIGNIFICANT CHANGE UP (ref 3.5–5.3)
POTASSIUM SERPL-SCNC: 3.8 MMOL/L — SIGNIFICANT CHANGE UP (ref 3.5–5.3)
PROT SERPL-MCNC: 5.7 G/DL — LOW (ref 6–8.3)
RBC # BLD: 4.06 M/UL — SIGNIFICANT CHANGE UP (ref 3.8–5.2)
RBC # FLD: 15.1 % — HIGH (ref 10.3–14.5)
SODIUM SERPL-SCNC: 132 MMOL/L — LOW (ref 135–145)
WBC # BLD: 14.49 K/UL — HIGH (ref 3.8–10.5)
WBC # FLD AUTO: 14.49 K/UL — HIGH (ref 3.8–10.5)

## 2021-07-20 PROCEDURE — 99232 SBSQ HOSP IP/OBS MODERATE 35: CPT

## 2021-07-20 RX ORDER — ACETAMINOPHEN 500 MG
1000 TABLET ORAL ONCE
Refills: 0 | Status: COMPLETED | OUTPATIENT
Start: 2021-07-20 | End: 2021-07-20

## 2021-07-20 RX ORDER — SORBITOL SOLUTION 70 %
30 SOLUTION, ORAL MISCELLANEOUS ONCE
Refills: 0 | Status: DISCONTINUED | OUTPATIENT
Start: 2021-07-20 | End: 2021-07-20

## 2021-07-20 RX ORDER — GABAPENTIN 400 MG/1
300 CAPSULE ORAL ONCE
Refills: 0 | Status: COMPLETED | OUTPATIENT
Start: 2021-07-20 | End: 2021-07-20

## 2021-07-20 RX ADMIN — OXYCODONE AND ACETAMINOPHEN 1 TABLET(S): 5; 325 TABLET ORAL at 21:00

## 2021-07-20 RX ADMIN — Medication 30 MILLIGRAM(S): at 03:05

## 2021-07-20 RX ADMIN — GABAPENTIN 300 MILLIGRAM(S): 400 CAPSULE ORAL at 10:25

## 2021-07-20 RX ADMIN — Medication 40 MILLIGRAM(S): at 06:10

## 2021-07-20 RX ADMIN — TAMSULOSIN HYDROCHLORIDE 0.4 MILLIGRAM(S): 0.4 CAPSULE ORAL at 20:03

## 2021-07-20 RX ADMIN — OXYCODONE AND ACETAMINOPHEN 1 TABLET(S): 5; 325 TABLET ORAL at 09:26

## 2021-07-20 RX ADMIN — Medication 30 MILLIGRAM(S): at 08:22

## 2021-07-20 RX ADMIN — Medication 81 MILLIGRAM(S): at 11:10

## 2021-07-20 RX ADMIN — Medication 400 MILLIGRAM(S): at 23:32

## 2021-07-20 RX ADMIN — OXYCODONE AND ACETAMINOPHEN 1 TABLET(S): 5; 325 TABLET ORAL at 08:22

## 2021-07-20 RX ADMIN — ATORVASTATIN CALCIUM 40 MILLIGRAM(S): 80 TABLET, FILM COATED ORAL at 20:10

## 2021-07-20 RX ADMIN — FAMOTIDINE 20 MILLIGRAM(S): 10 INJECTION INTRAVENOUS at 17:14

## 2021-07-20 RX ADMIN — ENOXAPARIN SODIUM 40 MILLIGRAM(S): 100 INJECTION SUBCUTANEOUS at 11:13

## 2021-07-20 RX ADMIN — CHLORHEXIDINE GLUCONATE 1 APPLICATION(S): 213 SOLUTION TOPICAL at 09:26

## 2021-07-20 RX ADMIN — OXYCODONE AND ACETAMINOPHEN 1 TABLET(S): 5; 325 TABLET ORAL at 20:10

## 2021-07-20 RX ADMIN — Medication 30 MILLIGRAM(S): at 20:03

## 2021-07-20 RX ADMIN — GABAPENTIN 300 MILLIGRAM(S): 400 CAPSULE ORAL at 17:14

## 2021-07-20 RX ADMIN — FLUTICASONE FUROATE, UMECLIDINIUM BROMIDE AND VILANTEROL TRIFENATATE 1 PUFF(S): 200; 62.5; 25 POWDER RESPIRATORY (INHALATION) at 14:26

## 2021-07-20 RX ADMIN — Medication 1 PATCH: at 11:13

## 2021-07-20 RX ADMIN — Medication 3 MILLILITER(S): at 20:14

## 2021-07-20 RX ADMIN — Medication 30 MILLIGRAM(S): at 14:25

## 2021-07-20 RX ADMIN — Medication 3 MILLILITER(S): at 14:25

## 2021-07-20 RX ADMIN — FAMOTIDINE 20 MILLIGRAM(S): 10 INJECTION INTRAVENOUS at 06:10

## 2021-07-20 RX ADMIN — GABAPENTIN 300 MILLIGRAM(S): 400 CAPSULE ORAL at 06:10

## 2021-07-20 RX ADMIN — Medication 3 MILLILITER(S): at 06:10

## 2021-07-20 NOTE — PROGRESS NOTE ADULT - PROBLEM SELECTOR PLAN 1
No BB 2/2 severe COPD w/ hypoxemia     Continue on Cardizem 30 mg PO Q6 hours for radial graft  D/C plan home PT once medically cleared No BB 2/2 severe COPD w/ hypoxemia    ck o2 sats on room air  + prednisone   per pulmonary    Continue on Cardizem 30 mg PO Q6 hours for radial graft  D/C plan home PT once medically

## 2021-07-20 NOTE — PROGRESS NOTE ADULT - ASSESSMENT
77y/o  female with COPD , HTN and PVD recent stress test that was abnormal on 6/22/21 now s/p CABG on 7/15     Hyponatremia(mild now)- secondary to telmisartan/HCTZ use - now discontinued,  , TSH normal, cortisol low   Non oliguric ZAC -  renal fxn resolving  s/p CABG on 7/15    RECOMMEND:  No indication  for salt tablets for now    Monitor Cr/NA  for now   Encourage protein intake   She remains on oxygen at present- Pulm follow up  Incentive spirometry     Sayed [facility]   [phone]

## 2021-07-20 NOTE — PROGRESS NOTE ADULT - SUBJECTIVE AND OBJECTIVE BOX
CHIEF COMPLAINT: f/up sob, preop resp, copd--tight chest/sob    Interval Events: nephro/cards f/up; s/p OHS 7/15, prednisone since 7/19    REVIEW OF SYSTEMS:  Constitutional: No fevers or chills. No weight loss. No fatigue or generalized malaise.  Eyes: No itching or discharge from the eyes  ENT: No ear pain. No ear discharge. No nasal congestion. No post nasal drip. No epistaxis. No throat pain. No sore throat. No difficulty swallowing.   CV: No chest pain. No palpitations. No lightheadedness or dizziness.   Resp: No dyspnea at rest. + dyspnea on exertion. No orthopnea. No wheezing. No cough. No stridor. No sputum production. No chest pain with respiration.  GI: No nausea. No vomiting. No diarrhea.  MSK: No joint pain or pain in any extremities  Integumentary: No skin lesions. No pedal edema.  Neurological: No gross motor weakness. No sensory changes.  [+ ] All other systems negative  [ ] Unable to assess ROS because ________    OBJECTIVE:  ICU Vital Signs Last 24 Hrs  T(C): 36.4 (20 Jul 2021 04:41), Max: 36.8 (19 Jul 2021 07:00)  T(F): 97.5 (20 Jul 2021 04:41), Max: 98.2 (19 Jul 2021 07:00)  HR: 80 (20 Jul 2021 04:41) (80 - 101)  BP: 103/62 (20 Jul 2021 04:41) (95/60 - 139/63)  BP(mean): 86 (19 Jul 2021 12:50) (82 - 86)  ABP: --  ABP(mean): --  RR: 18 (20 Jul 2021 04:41) (18 - 18)  SpO2: 90% (20 Jul 2021 04:41) (90% - 100%)        07-18 @ 07:01  -  07-19 @ 07:00  --------------------------------------------------------  IN: 1050 mL / OUT: 2050 mL / NET: -1000 mL    07-19 @ 07:01 - 07-20 @ 05:23  --------------------------------------------------------  IN: 1225 mL / OUT: 2200 mL / NET: -975 mL      CAPILLARY BLOOD GLUCOSE  159 (18 Jul 2021 11:47)      POCT Blood Glucose.: 101 mg/dL (19 Jul 2021 11:16)      PHYSICAL EXAM:  General: Awake, alert, oriented X 3.   HEENT: Atraumatic, normocephalic.                 Mallampatti Grade 3                No nasal congestion.                No tonsillar or pharyngeal exudates.  Lymph Nodes: No palpable lymphadenopathy  Neck: No JVD. No carotid bruit.   Respiratory: abnormal chest expansion                         Normal percussion                         Normal and equal air entry                         + wheeze,no rhonchi or rales.  Cardiovascular: S1 S2 normal. No murmurs, rubs or gallops.   Abdomen: Soft, non-tender, non-distended. No organomegaly. Normoactive bowel sounds.  Extremities: Warm to touch. Peripheral pulse palpable. No pedal edema.   Skin: No rashes or skin lesions  Neurological: Motor and sensory examination equal and normal in all four extremities.  Psychiatry: Appropriate mood and affect.    HOSPITAL MEDICATIONS:  MEDICATIONS  (STANDING):  albuterol/ipratropium for Nebulization 3 milliLiter(s) Nebulizer every 8 hours  aspirin enteric coated 81 milliGRAM(s) Oral daily  atorvastatin 40 milliGRAM(s) Oral at bedtime  bisacodyl Suppository 10 milliGRAM(s) Rectal once  chlorhexidine 2% Cloths 1 Application(s) Topical <User Schedule>  diltiazem    Tablet 30 milliGRAM(s) Oral every 6 hours  enoxaparin Injectable 40 milliGRAM(s) SubCutaneous daily  famotidine    Tablet 20 milliGRAM(s) Oral two times a day  fluticasone furoate/umeclidinium/vilanterol 100-62.5-25 MICROgram(s) Inhaler 1 Puff(s) Inhalation daily  furosemide    Tablet 40 milliGRAM(s) Oral daily  gabapentin 300 milliGRAM(s) Oral every 12 hours  nicotine - 21 mG/24Hr(s) Patch 1 patch Transdermal daily  polyethylene glycol 3350 17 Gram(s) Oral daily  predniSONE   Tablet 40 milliGRAM(s) Oral daily  senna 2 Tablet(s) Oral at bedtime  sodium chloride 0.9%. 1000 milliLiter(s) (10 mL/Hr) IV Continuous <Continuous>  tamsulosin 0.4 milliGRAM(s) Oral at bedtime    MEDICATIONS  (PRN):  acetaminophen   Tablet .. 650 milliGRAM(s) Oral every 6 hours PRN Mild Pain (1 - 3)  oxycodone    5 mG/acetaminophen 325 mG 1 Tablet(s) Oral every 6 hours PRN Moderate Pain (4 - 6)  oxycodone    5 mG/acetaminophen 325 mG 2 Tablet(s) Oral every 6 hours PRN Severe Pain (7 - 10)      LABS:                        13.0   15.21 )-----------( 145      ( 19 Jul 2021 03:44 )             38.2     07-19    134<L>  |  101  |  19  ----------------------------<  114<H>  4.6   |  22  |  1.02    Ca    9.5      19 Jul 2021 03:44  Phos  2.7     07-19  Mg     2.2     07-19                MICROBIOLOGY:     RADIOLOGY:  [ ] Reviewed and interpreted by me    Point of Care Ultrasound Findings:    PFT:    EKG:

## 2021-07-20 NOTE — PROGRESS NOTE ADULT - SUBJECTIVE AND OBJECTIVE BOX
Patient is a 76y old  Female who presents with a chief complaint of sp    C2L (20 Jul 2021 06:33)    Coverage for Dr. Michel Strickland   SUBJECTIVE / OVERNIGHT EVENTS: Comfortable without new complaints. Family at bedside.   Review of Systems  chest pain no  palpitations no  sob no  nausea no  headache no    MEDICATIONS  (STANDING):  albuterol/ipratropium for Nebulization 3 milliLiter(s) Nebulizer every 8 hours  aspirin enteric coated 81 milliGRAM(s) Oral daily  atorvastatin 40 milliGRAM(s) Oral at bedtime  bisacodyl Suppository 10 milliGRAM(s) Rectal once  chlorhexidine 2% Cloths 1 Application(s) Topical <User Schedule>  diltiazem    Tablet 30 milliGRAM(s) Oral every 6 hours  enoxaparin Injectable 40 milliGRAM(s) SubCutaneous daily  famotidine    Tablet 20 milliGRAM(s) Oral two times a day  fluticasone furoate/umeclidinium/vilanterol 100-62.5-25 MICROgram(s) Inhaler 1 Puff(s) Inhalation daily  furosemide    Tablet 40 milliGRAM(s) Oral daily  gabapentin 300 milliGRAM(s) Oral every 12 hours  nicotine - 21 mG/24Hr(s) Patch 1 patch Transdermal daily  polyethylene glycol 3350 17 Gram(s) Oral daily  predniSONE   Tablet 40 milliGRAM(s) Oral daily  senna 2 Tablet(s) Oral at bedtime  sodium chloride 0.9%. 1000 milliLiter(s) (10 mL/Hr) IV Continuous <Continuous>  tamsulosin 0.4 milliGRAM(s) Oral at bedtime    MEDICATIONS  (PRN):  acetaminophen   Tablet .. 650 milliGRAM(s) Oral every 6 hours PRN Mild Pain (1 - 3)  oxycodone    5 mG/acetaminophen 325 mG 1 Tablet(s) Oral every 6 hours PRN Moderate Pain (4 - 6)  oxycodone    5 mG/acetaminophen 325 mG 2 Tablet(s) Oral every 6 hours PRN Severe Pain (7 - 10)      Vital Signs Last 24 Hrs  T(C): 36.3 (20 Jul 2021 14:24), Max: 36.6 (19 Jul 2021 19:36)  T(F): 97.4 (20 Jul 2021 14:24), Max: 97.9 (19 Jul 2021 19:36)  HR: 90 (20 Jul 2021 14:24) (80 - 98)  BP: 105/70 (20 Jul 2021 14:24) (95/60 - 120/67)  BP(mean): --  RR: 18 (20 Jul 2021 14:24) (18 - 20)  SpO2: 94% (20 Jul 2021 14:24) (86% - 94%)    PHYSICAL EXAM:  GENERAL: NAD, well-developed  HEAD:  Atraumatic, Normocephalic  EYES: EOMI, PERRLA, conjunctiva and sclera clear  NECK: Supple, No JVD  CHEST/LUNG: Clear to auscultation bilaterally; No wheeze Sternal wound healing.  HEART: Regular rate and rhythm; No murmurs, rubs, or gallops  ABDOMEN: Soft, Nontender, Nondistended; Bowel sounds present  EXTREMITIES:  2+ Peripheral Pulses, No clubbing, cyanosis, or edema L arm wound clean  PSYCH: AAOx3  NEUROLOGY: non-focal  SKIN: No rashes or lesions    LABS:                        12.1   14.49 )-----------( 153      ( 20 Jul 2021 05:27 )             35.8     07-20    132<L>  |  96  |  21  ----------------------------<  120<H>  3.8   |  23  |  1.02    Ca    9.1      20 Jul 2021 05:25  Phos  2.6     07-20  Mg     2.0     07-20    TPro  5.7<L>  /  Alb  3.1<L>  /  TBili  0.9  /  DBili  x   /  AST  32  /  ALT  46<H>  /  AlkPhos  92  07-20                RADIOLOGY & ADDITIONAL TESTS:    Imaging Personally Reviewed:    Consultant(s) Notes Reviewed:      Care Discussed with Consultants/Other Providers:

## 2021-07-20 NOTE — PROGRESS NOTE ADULT - ASSESSMENT
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 Pulmonology consulted for possible COPD. Pt now on 2L O2 but may not be needed, not currently in exacerbation.     Problems:  COPD        Recommendation  - Continue trelegy 1 puff daily which pt has at bedside, please have RN bring down to pharmacy so can document in EMR that pt is actually taking  - Albuterol nebulizer q6h PRN  - Maintain O2 saturation > 88%, if she does not desaturate with ambulation then no need for O2/ acapella/incentive spirometry  *****************  7/13-mucusy in am--surgery 7/15                           (see below)  7/14-better today  7/15-ready for surgery-no new resp sx.  7/16-s/p OHS-extubated to NC  7/19-more cough/wheeze and hemoptysis  7/20-still tight-no hemoptysis

## 2021-07-20 NOTE — PROGRESS NOTE ADULT - PROBLEM SELECTOR PLAN 2
Pulmonary following   Continue Trelegy 1 puff daily which pt has at bedside RN to bring down to pharmacy so can document in EMR that   Continue on Albuterol nebulizer q6h PRN  Continue on 6 Liters NC 02 placed on humidified air 02 SAT 88-89% --> Wean 02 NC for a 02 sat > 88%. Pulmonary following   Continue Trelegy 1 puff daily which pt has at bedside RN to bring down to pharmacy so can document in EMR that   Continue on Albuterol nebulizer q6h PRN  Continue on 5 Liters NC 02 placed on humidified air 02 SAT 88-89% --> Wean 02 NC for a 02 sat > 88%.  amby sats   ? need for home o2

## 2021-07-20 NOTE — PROGRESS NOTE ADULT - ATTENDING COMMENTS
as above- still tight-s/p OHS 7/15- on NC O2--on prednisone 40mg D2  Multifactorial dyspnea--COPD-CB/emphysema (pt was seen by Dr. Montelongo in the office on 7/8   PFT showed a normal FEV1, TLC, DLCO of 68), CAD, debility--keep sat above 90%  COPD/CB/emphysejma--trelegy 1 q day, incentive spirometry, acapella, albuterol q 6; now w/ flair of copd vs ?CHF-***-added prednisone 40mg per           day D2                nicotine addiction-- smoking cessation advised.  lung CA screening---LDCT was to be done, will need f/up ct in 6 months  CAD s/p OHS 7/15; CT as per CTS- check BNP      DVT and GI prophylaxis     renal f/up                         DC planning    Isrrael Klein MD-Pulmonary   [phone]

## 2021-07-20 NOTE — PROGRESS NOTE ADULT - ASSESSMENT
pt  is a 75y/o  female with PMHX of Current tobacco smoker on Nicotine patch , COPD , HTN and PVD.   Pt had recent stress test that was abnormal on 6/22/21    s/p cabg X2  Postop care  hyponatremia likely sec to hctz  improved  renal f/u  copd  pulm follow  need for supplemental O2  c/w outpt meds  PVD stable  PT  smoking cessation  dvt proph   gi proph     Jovani Boyce MD pager [phone]

## 2021-07-20 NOTE — PROGRESS NOTE ADULT - SUBJECTIVE AND OBJECTIVE BOX
VITAL SIGNS    Telemetry:    sr 83  Vital Signs Last 24 Hrs  T(C): 36.4 (21 @ 04:41), Max: 36.8 (21 @ 07:00)  T(F): 97.5 (21 @ 04:41), Max: 98.2 (21 @ 07:00)  HR: 80 (21 @ 04:41) (80 - 101)  BP: 103/62 (21 @ 04:41) (95/60 - 139/63)  RR: 18 (21 @ 04:41) (18 - 18)  SpO2: 90% (21 @ 04:41) (90% - 100%)                   Daily     Daily Weight in k.1 (2021 07:00)      Bilirubin Total, Serum: 0.9 mg/dL ( @ 05:25)    CAPILLARY BLOOD GLUCOSE      POCT Blood Glucose.: 101 mg/dL (2021 11:16)  POCT Blood Glucose.: 112 mg/dL (2021 07:50)       Pacing Wires        [  ]   Settings:                                  Isolated  [  ]                       PHYSICAL EXAM    Neurology: alert and oriented x 3, moves all extremities with no defecits  CV :  RRR  Sternal Wound :  CDI , Stable  Lungs:   CTA B/L  Abdomen: soft, nontender, nondistended, positive bowel sounds, last bowel movement   Extremities:                                            VITAL SIGNS    Telemetry:    sr 83  Vital Signs Last 24 Hrs  T(C): 36.4 (21 @ 04:41), Max: 36.8 (21 @ 07:00)  T(F): 97.5 (21 @ 04:41), Max: 98.2 (21 @ 07:00)  HR: 80 (21 @ 04:41) (80 - 101)  BP: 103/62 (21 @ 04:41) (95/60 - 139/63)  RR: 18 (21 @ 04:41) (18 - 18)  SpO2: 90% (21 @ 04:41) (90% - 100%)                   Daily     Daily Weight in k.1 (2021 07:00)      Bilirubin Total, Serum: 0.9 mg/dL ( @ 05:25)    CAPILLARY BLOOD GLUCOSE      POCT Blood Glucose.: 101 mg/dL (2021 11:16)  POCT Blood Glucose.: 112 mg/dL (2021 07:50)                         PHYSICAL EXAM    Neurology: alert and oriented x 3, moves all extremities with no defecits  CV :  RRR  Sternal Wound :  CDI , Stable  Lungs: diminshed b/l    throughout  Abdomen: soft, nontender, nondistended, positive bowel sounds, last bowel movement    Extremities:     lt radial dressing   cdi   no pedal edema

## 2021-07-20 NOTE — PROGRESS NOTE ADULT - ASSESSMENT
76 year old female current everyday 3ppd smoker on nicotine patch Hx COPD, PVD, CAD presents to [facility] for LHC, as preop testing prior to PAD procedure. S/p LHC revealing TVD, CT surgery consulted for CABG eval.     7/9/21 CT Surgery evaluation in progress. Preoperative work up in progress. Dr. Cole to review angiogram findings.   7/15/21  S/P  C2L w/ L radial harvest,ef 45  Acute blood loss anemia- prbc- basleline hct elevated from smoking hx- 2u intraop and post op  SB- pacing- now nsr  Hypoxia- copd --> Pulmonary following   Cardizem for radial artery graft  7/16 Transferred to SDU on 6 Liters NC 02.   7/17 VVS; PW x 4 D/C'd.  Right IJ D/C'D and MS CT removed.  Continue on 6 Liters NC 02 placed on humidified air 02 SAT 88-89%.  Pulmonary following --> continue on home inhaler Trelegy inh 1 puff daily and Douneb neb Q6. No BB 2/2 severe COPD with hypoxemia.    7/19  5 l NC  sats 90 percent   OOB ambulating  neg  540 cc   wbc 18  no ss infection     7/20 76 year old female current everyday 3ppd smoker on nicotine patch Hx COPD, PVD, CAD presents to [facility] for LHC, as preop testing prior to PAD procedure. S/p LHC revealing TVD, CT surgery consulted for CABG eval.     7/9/21 CT Surgery evaluation in progress. Preoperative work up in progress. Dr. Cole to review angiogram findings.   7/15/21  S/P  C2L w/ L radial harvest,ef 45  Acute blood loss anemia- prbc- basleline hct elevated from smoking hx- 2u intraop and post op  SB- pacing- now nsr  Hypoxia- copd --> Pulmonary following   Cardizem for radial artery graft  7/16 Transferred to SDU on 6 Liters NC 02.   7/17 VVS; PW x 4 D/C'd.  Right IJ D/C'D and MS CT removed.  Continue on 6 Liters NC 02 placed on humidified air 02 SAT 88-89%.  Pulmonary following --> continue on home inhaler Trelegy inh 1 puff daily and Douneb neb Q6. No BB 2/2 severe COPD with hypoxemia.    7/19  5 l NC  sats 90 percent   OOB ambulating  neg  540 cc   wbc 18  no ss infection     7/20   5 l  nc  02   sat   94    prednisone  per  kassidy,     neg   one liter lasix 40 qd

## 2021-07-20 NOTE — CHART NOTE - NSCHARTNOTEFT_GEN_A_CORE
Ms Castrejon requires home 02   due to hypoxia (ICD10 R09.02   secondary to COPD  ( ICD J44.9)  and s/p CABG  for CAD  (KGS024.810)  O2 sat at rest without oxygen is 86 percent and ambulating 84 percent   and 5 Liters NC at rest 94 percent  and 92 percent ambulating     A Keiry ,  NP   spec     [id number] Ms Castrejon requires home 02   due to hypoxia (ICD10 R09.02   secondary to COPD  ( ICD J44.9)  and s/p CABG  for CAD  (CXV101.810)  O2 sat at rest without oxygen is 86 percent ON ROOM AIR and ambulating 84 percent ON ROOM AIR   and 5 Liters NC at rest 94 percent  and 92 percent ambulating     A JASPER Ricci   spec     [id number]

## 2021-07-20 NOTE — PROGRESS NOTE ADULT - SUBJECTIVE AND OBJECTIVE BOX
DATE OF SERVICE: 07-20-21 @ 08:58    Subjective: Patient seen and examined. No new events except as noted.     SUBJECTIVE/ROS:  has mild sob  hypoxic       MEDICATIONS:  MEDICATIONS  (STANDING):  albuterol/ipratropium for Nebulization 3 milliLiter(s) Nebulizer every 8 hours  aspirin enteric coated 81 milliGRAM(s) Oral daily  atorvastatin 40 milliGRAM(s) Oral at bedtime  bisacodyl Suppository 10 milliGRAM(s) Rectal once  chlorhexidine 2% Cloths 1 Application(s) Topical <User Schedule>  diltiazem    Tablet 30 milliGRAM(s) Oral every 6 hours  enoxaparin Injectable 40 milliGRAM(s) SubCutaneous daily  famotidine    Tablet 20 milliGRAM(s) Oral two times a day  fluticasone furoate/umeclidinium/vilanterol 100-62.5-25 MICROgram(s) Inhaler 1 Puff(s) Inhalation daily  furosemide    Tablet 40 milliGRAM(s) Oral daily  gabapentin 300 milliGRAM(s) Oral every 12 hours  nicotine - 21 mG/24Hr(s) Patch 1 patch Transdermal daily  polyethylene glycol 3350 17 Gram(s) Oral daily  predniSONE   Tablet 40 milliGRAM(s) Oral daily  senna 2 Tablet(s) Oral at bedtime  sodium chloride 0.9%. 1000 milliLiter(s) (10 mL/Hr) IV Continuous <Continuous>  tamsulosin 0.4 milliGRAM(s) Oral at bedtime      PHYSICAL EXAM:  T(C): 36.4 (07-20-21 @ 04:41), Max: 36.7 (07-19-21 @ 11:09)  HR: 98 (07-20-21 @ 08:17) (80 - 101)  BP: 110/67 (07-20-21 @ 08:17) (95/60 - 139/63)  RR: 18 (07-20-21 @ 04:41) (18 - 18)  SpO2: 90% (07-20-21 @ 04:41) (90% - 100%)  Wt(kg): --  I&O's Summary    19 Jul 2021 07:01  -  20 Jul 2021 07:00  --------------------------------------------------------  IN: 1325 mL / OUT: 2200 mL / NET: -875 mL    20 Jul 2021 07:01  -  20 Jul 2021 08:58  --------------------------------------------------------  IN: 0 mL / OUT: 400 mL / NET: -400 mL            JVP: Normal  Neck: supple  Lung: clear   CV: S1 S2 , Murmur:  Abd: soft  Ext: No edema  neuro: Awake / alert  Psych: flat affect  Skin: normal``    LABS/DATA:    CARDIAC MARKERS:                                12.1   14.49 )-----------( 153      ( 20 Jul 2021 05:27 )             35.8     07-20    132<L>  |  96  |  21  ----------------------------<  120<H>  3.8   |  23  |  1.02    Ca    9.1      20 Jul 2021 05:25  Phos  2.6     07-20  Mg     2.0     07-20    TPro  5.7<L>  /  Alb  3.1<L>  /  TBili  0.9  /  DBili  x   /  AST  32  /  ALT  46<H>  /  AlkPhos  92  07-20    proBNP:   Lipid Profile:   HgA1c:   TSH:     TELE:  EKG:

## 2021-07-20 NOTE — PROGRESS NOTE ADULT - ASSESSMENT
CAD  Triple vessel disease   asa  statin   s/p CABG  plan as per CTS    PVD  surgery on hold for now given above     tobacco   counseling given   nicotine patch     COPD  hypoxia  on nebs  steroids  plan as per pulm

## 2021-07-20 NOTE — PROGRESS NOTE ADULT - SUBJECTIVE AND OBJECTIVE BOX
[facility] [phone]        Patient seen and examined in bed.  She was in good spirits        MEDICATIONS  (STANDING):  albuterol/ipratropium for Nebulization 3 milliLiter(s) Nebulizer every 8 hours  aspirin enteric coated 81 milliGRAM(s) Oral daily  atorvastatin 40 milliGRAM(s) Oral at bedtime  bisacodyl Suppository 10 milliGRAM(s) Rectal once  chlorhexidine 2% Cloths 1 Application(s) Topical <User Schedule>  diltiazem    Tablet 30 milliGRAM(s) Oral every 6 hours  enoxaparin Injectable 40 milliGRAM(s) SubCutaneous daily  famotidine    Tablet 20 milliGRAM(s) Oral two times a day  fluticasone furoate/umeclidinium/vilanterol 100-62.5-25 MICROgram(s) Inhaler 1 Puff(s) Inhalation daily  furosemide    Tablet 40 milliGRAM(s) Oral daily  gabapentin 300 milliGRAM(s) Oral every 12 hours  nicotine - 21 mG/24Hr(s) Patch 1 patch Transdermal daily  polyethylene glycol 3350 17 Gram(s) Oral daily  predniSONE   Tablet 40 milliGRAM(s) Oral daily  senna 2 Tablet(s) Oral at bedtime  sodium chloride 0.9%. 1000 milliLiter(s) (10 mL/Hr) IV Continuous <Continuous>  tamsulosin 0.4 milliGRAM(s) Oral at bedtime      VITAL:  T(C): , Max: 36.7 (07-19-21 @ 11:09)  T(F): , Max: 98.1 (07-19-21 @ 11:09)  HR: 98 (07-20-21 @ 08:17)  BP: 110/67 (07-20-21 @ 08:17)  BP(mean): 86 (07-19-21 @ 12:50)  RR: 20 (07-20-21 @ 09:18)  SpO2: 86% (07-20-21 @ 09:18)  Wt(kg): --    I and O's:    07-19 @ 07:01  -  07-20 @ 07:00  --------------------------------------------------------  IN: 1325 mL / OUT: 2200 mL / NET: -875 mL    07-20 @ 07:01  -  07-20 @ 10:58  --------------------------------------------------------  IN: 240 mL / OUT: 400 mL / NET: -160 mL          PHYSICAL EXAM:    Constitutional: NAD; cachetic   Neck:  No JVD  Respiratory: CTAB/L  Cardiovascular: S1 and S2  Gastrointestinal: BS+, soft, NT/ND  Extremities: No peripheral edema  Neurological: A/O x 3, no focal deficits  Psychiatric: Normal mood, normal affect  : No Levy  Skin: No rashes  Access: Not applicable    LABS:                        12.1   14.49 )-----------( 153      ( 20 Jul 2021 05:27 )             35.8     07-20    132<L>  |  96  |  21  ----------------------------<  120<H>  3.8   |  23  |  1.02    Ca    9.1      20 Jul 2021 05:25  Phos  2.6     07-20  Mg     2.0     07-20    TPro  5.7<L>  /  Alb  3.1<L>  /  TBili  0.9  /  DBili  x   /  AST  32  /  ALT  46<H>  /  AlkPhos  92  07-20          Urine Studies:          RADIOLOGY & ADDITIONAL STUDIES:

## 2021-07-21 LAB
ANION GAP SERPL CALC-SCNC: 12 MMOL/L — SIGNIFICANT CHANGE UP (ref 5–17)
BUN SERPL-MCNC: 25 MG/DL — HIGH (ref 7–23)
CALCIUM SERPL-MCNC: 9.3 MG/DL — SIGNIFICANT CHANGE UP (ref 8.4–10.5)
CHLORIDE SERPL-SCNC: 99 MMOL/L — SIGNIFICANT CHANGE UP (ref 96–108)
CO2 SERPL-SCNC: 22 MMOL/L — SIGNIFICANT CHANGE UP (ref 22–31)
CREAT SERPL-MCNC: 1.09 MG/DL — SIGNIFICANT CHANGE UP (ref 0.5–1.3)
GLUCOSE SERPL-MCNC: 99 MG/DL — SIGNIFICANT CHANGE UP (ref 70–99)
HCT VFR BLD CALC: 36.2 % — SIGNIFICANT CHANGE UP (ref 34.5–45)
HGB BLD-MCNC: 12.4 G/DL — SIGNIFICANT CHANGE UP (ref 11.5–15.5)
MCHC RBC-ENTMCNC: 30.8 PG — SIGNIFICANT CHANGE UP (ref 27–34)
MCHC RBC-ENTMCNC: 34.3 GM/DL — SIGNIFICANT CHANGE UP (ref 32–36)
MCV RBC AUTO: 89.8 FL — SIGNIFICANT CHANGE UP (ref 80–100)
NRBC # BLD: 0 /100 WBCS — SIGNIFICANT CHANGE UP (ref 0–0)
PLATELET # BLD AUTO: 164 K/UL — SIGNIFICANT CHANGE UP (ref 150–400)
POTASSIUM SERPL-MCNC: 3.8 MMOL/L — SIGNIFICANT CHANGE UP (ref 3.5–5.3)
POTASSIUM SERPL-SCNC: 3.8 MMOL/L — SIGNIFICANT CHANGE UP (ref 3.5–5.3)
RBC # BLD: 4.03 M/UL — SIGNIFICANT CHANGE UP (ref 3.8–5.2)
RBC # FLD: 15.1 % — HIGH (ref 10.3–14.5)
SODIUM SERPL-SCNC: 133 MMOL/L — LOW (ref 135–145)
WBC # BLD: 13.29 K/UL — HIGH (ref 3.8–10.5)
WBC # FLD AUTO: 13.29 K/UL — HIGH (ref 3.8–10.5)

## 2021-07-21 PROCEDURE — 99232 SBSQ HOSP IP/OBS MODERATE 35: CPT

## 2021-07-21 RX ORDER — POTASSIUM CHLORIDE 20 MEQ
20 PACKET (EA) ORAL ONCE
Refills: 0 | Status: COMPLETED | OUTPATIENT
Start: 2021-07-21 | End: 2021-07-21

## 2021-07-21 RX ORDER — PANTOPRAZOLE SODIUM 20 MG/1
40 TABLET, DELAYED RELEASE ORAL
Refills: 0 | Status: DISCONTINUED | OUTPATIENT
Start: 2021-07-21 | End: 2021-07-22

## 2021-07-21 RX ORDER — POTASSIUM BICARBONATE 978 MG/1
25 TABLET, EFFERVESCENT ORAL ONCE
Refills: 0 | Status: DISCONTINUED | OUTPATIENT
Start: 2021-07-21 | End: 2021-07-21

## 2021-07-21 RX ADMIN — Medication 3 MILLILITER(S): at 21:13

## 2021-07-21 RX ADMIN — Medication 30 MILLIGRAM(S): at 08:17

## 2021-07-21 RX ADMIN — Medication 30 MILLIGRAM(S): at 20:19

## 2021-07-21 RX ADMIN — Medication 1 PATCH: at 13:20

## 2021-07-21 RX ADMIN — Medication 1 PATCH: at 11:11

## 2021-07-21 RX ADMIN — Medication 20 MILLIEQUIVALENT(S): at 09:40

## 2021-07-21 RX ADMIN — OXYCODONE AND ACETAMINOPHEN 2 TABLET(S): 5; 325 TABLET ORAL at 21:14

## 2021-07-21 RX ADMIN — Medication 3 MILLILITER(S): at 05:14

## 2021-07-21 RX ADMIN — ATORVASTATIN CALCIUM 40 MILLIGRAM(S): 80 TABLET, FILM COATED ORAL at 21:13

## 2021-07-21 RX ADMIN — GABAPENTIN 300 MILLIGRAM(S): 400 CAPSULE ORAL at 17:24

## 2021-07-21 RX ADMIN — TAMSULOSIN HYDROCHLORIDE 0.4 MILLIGRAM(S): 0.4 CAPSULE ORAL at 21:14

## 2021-07-21 RX ADMIN — Medication 40 MILLIGRAM(S): at 05:04

## 2021-07-21 RX ADMIN — FLUTICASONE FUROATE, UMECLIDINIUM BROMIDE AND VILANTEROL TRIFENATATE 1 PUFF(S): 200; 62.5; 25 POWDER RESPIRATORY (INHALATION) at 13:28

## 2021-07-21 RX ADMIN — Medication 81 MILLIGRAM(S): at 13:20

## 2021-07-21 RX ADMIN — Medication 1 PATCH: at 08:35

## 2021-07-21 RX ADMIN — Medication 30 MILLILITER(S): at 05:14

## 2021-07-21 RX ADMIN — CHLORHEXIDINE GLUCONATE 1 APPLICATION(S): 213 SOLUTION TOPICAL at 11:15

## 2021-07-21 RX ADMIN — OXYCODONE AND ACETAMINOPHEN 2 TABLET(S): 5; 325 TABLET ORAL at 08:17

## 2021-07-21 RX ADMIN — ENOXAPARIN SODIUM 40 MILLIGRAM(S): 100 INJECTION SUBCUTANEOUS at 13:20

## 2021-07-21 RX ADMIN — Medication 1000 MILLIGRAM(S): at 00:00

## 2021-07-21 RX ADMIN — OXYCODONE AND ACETAMINOPHEN 2 TABLET(S): 5; 325 TABLET ORAL at 21:45

## 2021-07-21 RX ADMIN — OXYCODONE AND ACETAMINOPHEN 2 TABLET(S): 5; 325 TABLET ORAL at 09:15

## 2021-07-21 RX ADMIN — GABAPENTIN 300 MILLIGRAM(S): 400 CAPSULE ORAL at 05:04

## 2021-07-21 RX ADMIN — FAMOTIDINE 20 MILLIGRAM(S): 10 INJECTION INTRAVENOUS at 05:04

## 2021-07-21 RX ADMIN — Medication 3 MILLILITER(S): at 13:21

## 2021-07-21 RX ADMIN — Medication 1 PATCH: at 18:35

## 2021-07-21 NOTE — PROGRESS NOTE ADULT - SUBJECTIVE AND OBJECTIVE BOX
[facility] [phone]        Patient seen and examined in bed. She was on the same amount of oxygen         MEDICATIONS  (STANDING):  albuterol/ipratropium for Nebulization 3 milliLiter(s) Nebulizer every 8 hours  aspirin enteric coated 81 milliGRAM(s) Oral daily  atorvastatin 40 milliGRAM(s) Oral at bedtime  bisacodyl Suppository 10 milliGRAM(s) Rectal once  chlorhexidine 2% Cloths 1 Application(s) Topical <User Schedule>  diltiazem    Tablet 30 milliGRAM(s) Oral every 6 hours  enoxaparin Injectable 40 milliGRAM(s) SubCutaneous daily  famotidine    Tablet 20 milliGRAM(s) Oral two times a day  fluticasone furoate/umeclidinium/vilanterol 100-62.5-25 MICROgram(s) Inhaler 1 Puff(s) Inhalation daily  furosemide    Tablet 40 milliGRAM(s) Oral daily  gabapentin 300 milliGRAM(s) Oral every 12 hours  nicotine - 21 mG/24Hr(s) Patch 1 patch Transdermal daily  polyethylene glycol 3350 17 Gram(s) Oral daily  potassium chloride    Tablet ER 20 milliEquivalent(s) Oral once  predniSONE   Tablet 40 milliGRAM(s) Oral daily  senna 2 Tablet(s) Oral at bedtime  sodium chloride 0.9%. 1000 milliLiter(s) (10 mL/Hr) IV Continuous <Continuous>  tamsulosin 0.4 milliGRAM(s) Oral at bedtime      VITAL:  T(C): , Max: 37.1 (07-21-21 @ 05:04)  T(F): , Max: 98.8 (07-21-21 @ 05:04)  HR: 113 (07-21-21 @ 08:11)  BP: 113/67 (07-21-21 @ 08:11)  BP(mean): --  RR: 18 (07-21-21 @ 05:04)  SpO2: 93% (07-21-21 @ 05:04)  Wt(kg): --    I and O's:    07-20 @ 07:01  -  07-21 @ 07:00  --------------------------------------------------------  IN: 780 mL / OUT: 1150 mL / NET: -370 mL          PHYSICAL EXAM:    Constitutional: NAD; cachetic   Neck:  No JVD  Respiratory: CTAB/L  Cardiovascular: S1 and S2 tachy   Gastrointestinal: BS+, soft, NT/ND  Extremities: No peripheral edema  Neurological: A/O x 3, no focal deficits  Psychiatric: Normal mood, normal affect  : No Levy  Skin: No rashes  Access: Not applicable    LABS:                        12.4   13.29 )-----------( 164      ( 21 Jul 2021 05:43 )             36.2     07-21    133<L>  |  99  |  25<H>  ----------------------------<  99  3.8   |  22  |  1.09    Ca    9.3      21 Jul 2021 05:41  Phos  2.6     07-20  Mg     2.0     07-20    TPro  5.7<L>  /  Alb  3.1<L>  /  TBili  0.9  /  DBili  x   /  AST  32  /  ALT  46<H>  /  AlkPhos  92  07-20          Urine Studies:          RADIOLOGY & ADDITIONAL STUDIES:

## 2021-07-21 NOTE — PROGRESS NOTE ADULT - PROBLEM SELECTOR PROBLEM 2
The patient called stating that his eye is getting better by the hour. He said his vision is a little blurry. The patient was asking to change the appointment to next week as discussed with Dr Chin. The patient questioned if he needs to continue to use the antibiotic drop every 2 hours as originally prescribed. After reviewing doctor's notes I changed the appointment to next Tuesday and told the patient that I will check as to how he should use the eyedrops.  
Hypoxia

## 2021-07-21 NOTE — PROGRESS NOTE ADULT - ASSESSMENT
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 Pulmonology consulted for possible COPD. Pt now on 2L O2 but may not be needed, not currently in exacerbation.     Problems:  COPD        Recommendation  - Continue trelegy 1 puff daily which pt has at bedside, please have RN bring down to pharmacy so can document in EMR that pt is actually taking  - Albuterol nebulizer q6h PRN  - Maintain O2 saturation > 88%, if she does not desaturate with ambulation then no need for O2/ acapella/incentive spirometry  *****************  7/13-mucusy in am--surgery 7/15                           (see below)  7/14-better today  7/15-ready for surgery-no new resp sx.  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 Pulmonology consulted for possible COPD. Pt now on 2L O2 but may not be needed, not currently in exacerbation.     Problems:  COPD        Recommendation  - Continue trelegy 1 puff daily which pt has at bedside, please have RN bring down to pharmacy so can document in EMR that pt is actually taking  - Albuterol nebulizer q6h PRN  - Maintain O2 saturation > 88%, if she does not desaturate with ambulation then no need for O2/ acapella/incentive spirometry  *****************  7/13-mucusy in am--surgery 7/15                           (see below)  7/14-better today  7/15-ready for surgery-no new resp sx.  7/16-s/p OHS-extubated to NC  7/19-more cough/wheeze and hemoptysis  7/20-still tight-no hemoptysis  7/21-poor night -reflux #1 issue

## 2021-07-21 NOTE — PROGRESS NOTE ADULT - ASSESSMENT
76 year old female current everyday 3ppd smoker on nicotine patch Hx COPD, PVD, CAD presents to [facility] for LHC, as preop testing prior to PAD procedure. S/p LHC revealing TVD, CT surgery consulted for CABG eval.     7/9/21 CT Surgery evaluation in progress. Preoperative work up in progress. Dr. Cole to review angiogram findings.   7/15/21  S/P  C2L w/ L radial harvest,ef 45  Acute blood loss anemia- prbc- basleline hct elevated from smoking hx- 2u intraop and post op  SB- pacing- now nsr  Hypoxia- copd --> Pulmonary following   Cardizem for radial artery graft  7/16 Transferred to SDU on 6 Liters NC 02.   7/17 VVS; PW x 4 D/C'd.  Right IJ D/C'D and MS CT removed.  Continue on 6 Liters NC 02 placed on humidified air 02 SAT 88-89%.  Pulmonary following --> continue on home inhaler Trelegy inh 1 puff daily and Douneb neb Q6. No BB 2/2 severe COPD with hypoxemia.    7/19  5 l NC  sats 90 percent   OOB ambulating  neg  540 cc   wbc 18  no ss infection     7/20   5 l  nc  02   sat   94    prednisone  per  kassidy,     neg   one liter lasix 40 qd   7/21 SR 70's 92-94% 5L  Cont pred.  -370cc/24 hrs

## 2021-07-21 NOTE — PROGRESS NOTE ADULT - ASSESSMENT
CAD  Triple vessel disease   asa  statin   s/p CABG  plan as per CTS    PVD  surgery on hold for now given above     tobacco   counseling given   nicotine patch     COPD  hypoxia  on nebs  steroids  plan as per pulm     I will be away till Sunday July 25. Dr Nithin Hamilton is covering me for any urgent issues.

## 2021-07-21 NOTE — PROGRESS NOTE ADULT - SUBJECTIVE AND OBJECTIVE BOX
DATE OF SERVICE: 07-21-21 @ 09:32    Subjective: Patient seen and examined. No new events except as noted.     SUBJECTIVE/ROS:    feels ok     MEDICATIONS:  MEDICATIONS  (STANDING):  albuterol/ipratropium for Nebulization 3 milliLiter(s) Nebulizer every 8 hours  aspirin enteric coated 81 milliGRAM(s) Oral daily  atorvastatin 40 milliGRAM(s) Oral at bedtime  bisacodyl Suppository 10 milliGRAM(s) Rectal once  chlorhexidine 2% Cloths 1 Application(s) Topical <User Schedule>  diltiazem    Tablet 30 milliGRAM(s) Oral every 6 hours  enoxaparin Injectable 40 milliGRAM(s) SubCutaneous daily  famotidine    Tablet 20 milliGRAM(s) Oral two times a day  fluticasone furoate/umeclidinium/vilanterol 100-62.5-25 MICROgram(s) Inhaler 1 Puff(s) Inhalation daily  furosemide    Tablet 40 milliGRAM(s) Oral daily  gabapentin 300 milliGRAM(s) Oral every 12 hours  nicotine - 21 mG/24Hr(s) Patch 1 patch Transdermal daily  polyethylene glycol 3350 17 Gram(s) Oral daily  potassium chloride    Tablet ER 20 milliEquivalent(s) Oral once  predniSONE   Tablet 40 milliGRAM(s) Oral daily  senna 2 Tablet(s) Oral at bedtime  sodium chloride 0.9%. 1000 milliLiter(s) (10 mL/Hr) IV Continuous <Continuous>  tamsulosin 0.4 milliGRAM(s) Oral at bedtime      PHYSICAL EXAM:  T(C): 37.1 (07-21-21 @ 05:04), Max: 37.1 (07-21-21 @ 05:04)  HR: 113 (07-21-21 @ 08:11) (80 - 113)  BP: 113/67 (07-21-21 @ 08:11) (96/60 - 127/77)  RR: 18 (07-21-21 @ 05:04) (18 - 18)  SpO2: 93% (07-21-21 @ 05:04) (92% - 94%)  Wt(kg): --  I&O's Summary    20 Jul 2021 07:01  -  21 Jul 2021 07:00  --------------------------------------------------------  IN: 780 mL / OUT: 1150 mL / NET: -370 mL    21 Jul 2021 07:01  -  21 Jul 2021 09:32  --------------------------------------------------------  IN: 400 mL / OUT: 0 mL / NET: 400 mL            JVP: Normal  Neck: supple  Lung: clear   CV: S1 S2 , Murmur:  Abd: soft  Ext: No edema  neuro: Awake / alert  Psych: flat affect  Skin: normal``    LABS/DATA:    CARDIAC MARKERS:                                12.4   13.29 )-----------( 164      ( 21 Jul 2021 05:43 )             36.2     07-21    133<L>  |  99  |  25<H>  ----------------------------<  99  3.8   |  22  |  1.09    Ca    9.3      21 Jul 2021 05:41  Phos  2.6     07-20  Mg     2.0     07-20    TPro  5.7<L>  /  Alb  3.1<L>  /  TBili  0.9  /  DBili  x   /  AST  32  /  ALT  46<H>  /  AlkPhos  92  07-20    proBNP:   Lipid Profile:   HgA1c:   TSH:     TELE:  EKG:

## 2021-07-21 NOTE — PROGRESS NOTE ADULT - SUBJECTIVE AND OBJECTIVE BOX
S:  c/o epigastric discomfort.  No n/v + BM    Telemetry:  SR 70's    Vital Signs Last 24 Hrs  T(C): 37.1 (07-21-21 @ 05:04), Max: 37.1 (07-21-21 @ 05:04)  T(F): 98.8 (07-21-21 @ 05:04), Max: 98.8 (07-21-21 @ 05:04)  HR: 113 (07-21-21 @ 08:11) (80 - 113)  BP: 113/67 (07-21-21 @ 08:11) (96/60 - 127/77)  RR: 18 (07-21-21 @ 05:04) (18 - 18)  SpO2: 93% (07-21-21 @ 05:04) (92% - 94%)                   07-20 @ 07:01  -  07-21 @ 07:00  --------------------------------------------------------  IN: 780 mL / OUT: 1150 mL / NET: -370 mL    07-21 @ 07:01  -  07-21 @ 11:30  --------------------------------------------------------  IN: 400 mL / OUT: 0 mL / NET: 400 mL                Drains:     MS         [  ] Drainage:                 L Pleural  [  ]  Drainage:                R Pleural  [  ]  Drainage:    Pacing Wires        [  ]   Settings:                                  Isolated  [  ]    Coumadin    [ ] YES          [ x ]      NO         Reason:                                 12.4   13.29 )-----------( 164      ( 21 Jul 2021 05:43 )             36.2       07-21    133<L>  |  99  |  25<H>  ----------------------------<  99  3.8   |  22  |  1.09    Ca    9.3      21 Jul 2021 05:41  Phos  2.6     07-20  Mg     2.0     07-20    TPro  5.7<L>  /  Alb  3.1<L>  /  TBili  0.9  /  DBili  x   /  AST  32  /  ALT  46<H>  /  AlkPhos  92  07-20          PHYSICAL EXAM:    Neurology: alert and oriented x 3, nonfocal, no gross deficits    CV :  S1S2 heard RRR    Sternal Wound :  CDI , Stable    Lungs:  On O2.  clear to ausc.  No w/r/r    Abdomen: soft, nontender, nondistended, positive bowel sounds, last bowel movement            Extremities:     no edema          acetaminophen   Tablet .. 650 milliGRAM(s) Oral every 6 hours PRN  albuterol/ipratropium for Nebulization 3 milliLiter(s) Nebulizer every 8 hours  aluminum hydroxide/magnesium hydroxide/simethicone Suspension 30 milliLiter(s) Oral every 6 hours PRN  aspirin enteric coated 81 milliGRAM(s) Oral daily  atorvastatin 40 milliGRAM(s) Oral at bedtime  bisacodyl Suppository 10 milliGRAM(s) Rectal once  chlorhexidine 2% Cloths 1 Application(s) Topical <User Schedule>  diltiazem    Tablet 30 milliGRAM(s) Oral every 6 hours  enoxaparin Injectable 40 milliGRAM(s) SubCutaneous daily  famotidine    Tablet 20 milliGRAM(s) Oral two times a day  fluticasone furoate/umeclidinium/vilanterol 100-62.5-25 MICROgram(s) Inhaler 1 Puff(s) Inhalation daily  furosemide    Tablet 40 milliGRAM(s) Oral daily  gabapentin 300 milliGRAM(s) Oral every 12 hours  nicotine - 21 mG/24Hr(s) Patch 1 patch Transdermal daily  oxycodone    5 mG/acetaminophen 325 mG 1 Tablet(s) Oral every 6 hours PRN  oxycodone    5 mG/acetaminophen 325 mG 2 Tablet(s) Oral every 6 hours PRN  polyethylene glycol 3350 17 Gram(s) Oral daily  predniSONE   Tablet 40 milliGRAM(s) Oral daily  senna 2 Tablet(s) Oral at bedtime  sodium chloride 0.9%. 1000 milliLiter(s) IV Continuous <Continuous>  tamsulosin 0.4 milliGRAM(s) Oral at bedtime                              Physical Therapy Rec:   Home  [ x ]   Home w/ PT  [  ]  Rehab  [  ]    Discussed with Cardiothoracic Team at AM rounds.

## 2021-07-21 NOTE — PROGRESS NOTE ADULT - ATTENDING COMMENTS
as above- still tight-s/p OHS 7/15- on NC O2--on prednisone 40mg D2  Multifactorial dyspnea--COPD-CB/emphysema (pt was seen by Dr. Montelongo in the office on 7/8   PFT showed a normal FEV1, TLC, DLCO of 68), CAD, debility--keep sat above 90%  COPD/CB/emphysejma--trelegy 1 q day, incentive spirometry, acapella, albuterol q 6; now w/ flair of copd vs ?CHF-***-added prednisone 40mg per           day D2                nicotine addiction-- smoking cessation advised.  lung CA screening---LDCT was to be done, will need f/up ct in 6 months  CAD s/p OHS 7/15; CT as per CTS- check BNP      DVT and GI prophylaxis     renal f/up                         DC planning    Isrrael Klein MD-Pulmonary   [phone] as above- HB is #1 issue-poor sleep-s/p OHS 7/15- on NC O2--on prednisone 40mg D2  Multifactorial dyspnea--COPD-CB/emphysema (pt was seen by Dr. Montelongo in the office on 7/8   PFT showed a normal FEV1, TLC, DLCO of 68), CAD, debility--keep sat above 90%  COPD/CB/emphysejma--trelegy 1 q day, incentive spirometry, acapella, albuterol q 6; now w/ flair of copd vs ?CHF-***-added prednisone 40mg per           day D2                nicotine addiction-- smoking cessation advised.                                        ******GI-am PPI and add pepcid 40 q hs to regimen  lung CA screening---LDCT was to be done, will need f/up ct in 6 months  CAD s/p OHS 7/15; CT as per CTS- check BNP      DVT and GI prophylaxis     renal f/up                         DC planning    Isrrael Klein MD-Pulmonary   [phone]

## 2021-07-21 NOTE — PROGRESS NOTE ADULT - ASSESSMENT
pt  is a 75y/o  female with PMHX of Current tobacco smoker on Nicotine patch , COPD , HTN and PVD.   Pt had recent stress test that was abnormal on 6/22/21    s/p cabg X2  Postop care  hyponatremia likely sec to hctz  improved  renal f/u  copd  pulm follow  need for supplemental O2  c/w outpt meds  PVD stable  PT  smoking cessation  dvt proph   gi proph   d/w patient and daughters     Jovani Boyce MD pager [phone]

## 2021-07-21 NOTE — PROGRESS NOTE ADULT - SUBJECTIVE AND OBJECTIVE BOX
CHIEF COMPLAINT:  f/up sob, preop resp, copd--tight chest/sob    Interval Events: nephro/cards f/up; s/p OHS 7/15, prednisone since 7/19    REVIEW OF SYSTEMS:  Constitutional: No fevers or chills. No weight loss. No fatigue or generalized malaise.  Eyes: No itching or discharge from the eyes  ENT: No ear pain. No ear discharge. No nasal congestion. No post nasal drip. No epistaxis. No throat pain. No sore throat. No difficulty swallowing.   CV: No chest pain. No palpitations. No lightheadedness or dizziness.   Resp: No dyspnea at rest. No dyspnea on exertion. No orthopnea. No wheezing. No cough. No stridor. No sputum production. No chest pain with respiration.  GI: No nausea. No vomiting. No diarrhea.  MSK: No joint pain or pain in any extremities  Integumentary: No skin lesions. No pedal edema.  Neurological: No gross motor weakness. No sensory changes.  [ ] All other systems negative  [ ] Unable to assess ROS because ________    OBJECTIVE:  ICU Vital Signs Last 24 Hrs  T(C): 36.8 (20 Jul 2021 19:41), Max: 36.8 (20 Jul 2021 19:41)  T(F): 98.2 (20 Jul 2021 19:41), Max: 98.2 (20 Jul 2021 19:41)  HR: 80 (21 Jul 2021 02:07) (80 - 98)  BP: 96/60 (21 Jul 2021 02:07) (96/60 - 110/70)  BP(mean): --  ABP: --  ABP(mean): --  RR: 18 (20 Jul 2021 19:41) (18 - 20)  SpO2: 92% (20 Jul 2021 19:41) (86% - 94%)        07-19 @ 07:01  -  07-20 @ 07:00  --------------------------------------------------------  IN: 1325 mL / OUT: 2200 mL / NET: -875 mL    07-20 @ 07:01 - 07-21 @ 05:02  --------------------------------------------------------  IN: 780 mL / OUT: 1150 mL / NET: -370 mL      CAPILLARY BLOOD GLUCOSE      POCT Blood Glucose.: 101 mg/dL (19 Jul 2021 11:16)      PHYSICAL EXAM:  General: Awake, alert, oriented X 3.   HEENT: Atraumatic, normocephalic.                 Mallampatti Grade                 No nasal congestion.                No tonsillar or pharyngeal exudates.  Lymph Nodes: No palpable lymphadenopathy  Neck: No JVD. No carotid bruit.   Respiratory: Normal chest expansion                         Normal percussion                         Normal and equal air entry                         No wheeze, rhonchi or rales.  Cardiovascular: S1 S2 normal. No murmurs, rubs or gallops.   Abdomen: Soft, non-tender, non-distended. No organomegaly. Normoactive bowel sounds.  Extremities: Warm to touch. Peripheral pulse palpable. No pedal edema.   Skin: No rashes or skin lesions  Neurological: Motor and sensory examination equal and normal in all four extremities.  Psychiatry: Appropriate mood and affect.    HOSPITAL MEDICATIONS:  MEDICATIONS  (STANDING):  albuterol/ipratropium for Nebulization 3 milliLiter(s) Nebulizer every 8 hours  aspirin enteric coated 81 milliGRAM(s) Oral daily  atorvastatin 40 milliGRAM(s) Oral at bedtime  bisacodyl Suppository 10 milliGRAM(s) Rectal once  chlorhexidine 2% Cloths 1 Application(s) Topical <User Schedule>  diltiazem    Tablet 30 milliGRAM(s) Oral every 6 hours  enoxaparin Injectable 40 milliGRAM(s) SubCutaneous daily  famotidine    Tablet 20 milliGRAM(s) Oral two times a day  fluticasone furoate/umeclidinium/vilanterol 100-62.5-25 MICROgram(s) Inhaler 1 Puff(s) Inhalation daily  furosemide    Tablet 40 milliGRAM(s) Oral daily  gabapentin 300 milliGRAM(s) Oral every 12 hours  nicotine - 21 mG/24Hr(s) Patch 1 patch Transdermal daily  polyethylene glycol 3350 17 Gram(s) Oral daily  predniSONE   Tablet 40 milliGRAM(s) Oral daily  senna 2 Tablet(s) Oral at bedtime  sodium chloride 0.9%. 1000 milliLiter(s) (10 mL/Hr) IV Continuous <Continuous>  tamsulosin 0.4 milliGRAM(s) Oral at bedtime    MEDICATIONS  (PRN):  acetaminophen   Tablet .. 650 milliGRAM(s) Oral every 6 hours PRN Mild Pain (1 - 3)  oxycodone    5 mG/acetaminophen 325 mG 1 Tablet(s) Oral every 6 hours PRN Moderate Pain (4 - 6)  oxycodone    5 mG/acetaminophen 325 mG 2 Tablet(s) Oral every 6 hours PRN Severe Pain (7 - 10)      LABS:                        12.1   14.49 )-----------( 153      ( 20 Jul 2021 05:27 )             35.8     07-20    132<L>  |  96  |  21  ----------------------------<  120<H>  3.8   |  23  |  1.02    Ca    9.1      20 Jul 2021 05:25  Phos  2.6     07-20  Mg     2.0     07-20    TPro  5.7<L>  /  Alb  3.1<L>  /  TBili  0.9  /  DBili  x   /  AST  32  /  ALT  46<H>  /  AlkPhos  92  07-20              MICROBIOLOGY:     RADIOLOGY:  [ ] Reviewed and interpreted by me    Point of Care Ultrasound Findings:    PFT:    EKG: CHIEF COMPLAINT:  f/up sob, preop resp, copd--tight chest/sob-heart burn is primary issue    Interval Events: nephro/cards f/up; s/p OHS 7/15, prednisone since 7/19    REVIEW OF SYSTEMS:  Constitutional: No fevers or chills. No weight loss. No fatigue or generalized malaise.  Eyes: No itching or discharge from the eyes  ENT: No ear pain. No ear discharge. No nasal congestion. No post nasal drip. No epistaxis. No throat pain. No sore throat. No difficulty swallowing.   CV: No chest pain. No palpitations. No lightheadedness or dizziness.   Resp: No dyspnea at rest. + dyspnea on exertion. No orthopnea. No wheezing. No cough. No stridor. No sputum production. No chest pain with respiration.  GI: No nausea. No vomiting. No diarrhea.  MSK: No joint pain or pain in any extremities  Integumentary: No skin lesions. No pedal edema.  Neurological: No gross motor weakness. No sensory changes.  [+ ] All other systems negative  [ ] Unable to assess ROS because ________    OBJECTIVE:  ICU Vital Signs Last 24 Hrs  T(C): 36.8 (20 Jul 2021 19:41), Max: 36.8 (20 Jul 2021 19:41)  T(F): 98.2 (20 Jul 2021 19:41), Max: 98.2 (20 Jul 2021 19:41)  HR: 80 (21 Jul 2021 02:07) (80 - 98)  BP: 96/60 (21 Jul 2021 02:07) (96/60 - 110/70)  BP(mean): --  ABP: --  ABP(mean): --  RR: 18 (20 Jul 2021 19:41) (18 - 20)  SpO2: 92% (20 Jul 2021 19:41) (86% - 94%)        07-19 @ 07:01  -  07-20 @ 07:00  --------------------------------------------------------  IN: 1325 mL / OUT: 2200 mL / NET: -875 mL    07-20 @ 07:01  -  07-21 @ 05:02  --------------------------------------------------------  IN: 780 mL / OUT: 1150 mL / NET: -370 mL      CAPILLARY BLOOD GLUCOSE      POCT Blood Glucose.: 101 mg/dL (19 Jul 2021 11:16)      PHYSICAL EXAM: upset over HB etc  General: Awake, alert, oriented X 3.   HEENT: Atraumatic, normocephalic.                 Mallampatti Grade                 No nasal congestion.                No tonsillar or pharyngeal exudates.  Lymph Nodes: No palpable lymphadenopathy  Neck: No JVD. No carotid bruit.   Respiratory: Normal chest expansion                         Normal percussion                         Normal and equal air entry                         less wheeze, no rhonchi or rales.  Cardiovascular: S1 S2 normal. No murmurs, rubs or gallops.   Abdomen: Soft, non-tender, non-distended. No organomegaly. Normoactive bowel sounds.  Extremities: Warm to touch. Peripheral pulse palpable. No pedal edema.   Skin: No rashes or skin lesions  Neurological: Motor and sensory examination equal and normal in all four extremities.  Psychiatry: Appropriate mood and affect.    HOSPITAL MEDICATIONS:  MEDICATIONS  (STANDING):  albuterol/ipratropium for Nebulization 3 milliLiter(s) Nebulizer every 8 hours  aspirin enteric coated 81 milliGRAM(s) Oral daily  atorvastatin 40 milliGRAM(s) Oral at bedtime  bisacodyl Suppository 10 milliGRAM(s) Rectal once  chlorhexidine 2% Cloths 1 Application(s) Topical <User Schedule>  diltiazem    Tablet 30 milliGRAM(s) Oral every 6 hours  enoxaparin Injectable 40 milliGRAM(s) SubCutaneous daily  famotidine    Tablet 20 milliGRAM(s) Oral two times a day  fluticasone furoate/umeclidinium/vilanterol 100-62.5-25 MICROgram(s) Inhaler 1 Puff(s) Inhalation daily  furosemide    Tablet 40 milliGRAM(s) Oral daily  gabapentin 300 milliGRAM(s) Oral every 12 hours  nicotine - 21 mG/24Hr(s) Patch 1 patch Transdermal daily  polyethylene glycol 3350 17 Gram(s) Oral daily  predniSONE   Tablet 40 milliGRAM(s) Oral daily  senna 2 Tablet(s) Oral at bedtime  sodium chloride 0.9%. 1000 milliLiter(s) (10 mL/Hr) IV Continuous <Continuous>  tamsulosin 0.4 milliGRAM(s) Oral at bedtime    MEDICATIONS  (PRN):  acetaminophen   Tablet .. 650 milliGRAM(s) Oral every 6 hours PRN Mild Pain (1 - 3)  oxycodone    5 mG/acetaminophen 325 mG 1 Tablet(s) Oral every 6 hours PRN Moderate Pain (4 - 6)  oxycodone    5 mG/acetaminophen 325 mG 2 Tablet(s) Oral every 6 hours PRN Severe Pain (7 - 10)      LABS:                        12.1   14.49 )-----------( 153      ( 20 Jul 2021 05:27 )             35.8     07-20    132<L>  |  96  |  21  ----------------------------<  120<H>  3.8   |  23  |  1.02    Ca    9.1      20 Jul 2021 05:25  Phos  2.6     07-20  Mg     2.0     07-20    TPro  5.7<L>  /  Alb  3.1<L>  /  TBili  0.9  /  DBili  x   /  AST  32  /  ALT  46<H>  /  AlkPhos  92  07-20              MICROBIOLOGY:     RADIOLOGY:  [ ] Reviewed and interpreted by me    Point of Care Ultrasound Findings:    PFT:    EKG:

## 2021-07-21 NOTE — PROGRESS NOTE ADULT - PROBLEM SELECTOR PLAN 2
Pulmonary following   Continue Trelegy 1 puff daily which pt has at bedside RN to bring down to pharmacy so can document in EMR that   Continue on Albuterol nebulizer q6h PRN  Continue on 5 Liters NC 02 placed on humidified air 02 SAT 92-94%.    Home on O2

## 2021-07-21 NOTE — PROGRESS NOTE ADULT - SUBJECTIVE AND OBJECTIVE BOX
Patient is a 76y old  Female who presents with a chief complaint of sob (21 Jul 2021 05:02)    Coverage for Dr. Michel Strickland   SUBJECTIVE / OVERNIGHT EVENTS: Comfortable without new complaints. Daughters at bedside.  Review of Systems  chest pain no  palpitations no  sob no  nausea no  headache no    MEDICATIONS  (STANDING):  albuterol/ipratropium for Nebulization 3 milliLiter(s) Nebulizer every 8 hours  aspirin enteric coated 81 milliGRAM(s) Oral daily  atorvastatin 40 milliGRAM(s) Oral at bedtime  bisacodyl Suppository 10 milliGRAM(s) Rectal once  chlorhexidine 2% Cloths 1 Application(s) Topical <User Schedule>  diltiazem    Tablet 30 milliGRAM(s) Oral every 6 hours  enoxaparin Injectable 40 milliGRAM(s) SubCutaneous daily  fluticasone furoate/umeclidinium/vilanterol 100-62.5-25 MICROgram(s) Inhaler 1 Puff(s) Inhalation daily  furosemide    Tablet 40 milliGRAM(s) Oral daily  gabapentin 300 milliGRAM(s) Oral every 12 hours  nicotine - 21 mG/24Hr(s) Patch 1 patch Transdermal daily  pantoprazole    Tablet 40 milliGRAM(s) Oral before breakfast  polyethylene glycol 3350 17 Gram(s) Oral daily  predniSONE   Tablet 40 milliGRAM(s) Oral daily  senna 2 Tablet(s) Oral at bedtime  sodium chloride 0.9%. 1000 milliLiter(s) (10 mL/Hr) IV Continuous <Continuous>  tamsulosin 0.4 milliGRAM(s) Oral at bedtime    MEDICATIONS  (PRN):  acetaminophen   Tablet .. 650 milliGRAM(s) Oral every 6 hours PRN Mild Pain (1 - 3)  aluminum hydroxide/magnesium hydroxide/simethicone Suspension 30 milliLiter(s) Oral every 6 hours PRN Dyspepsia  oxycodone    5 mG/acetaminophen 325 mG 1 Tablet(s) Oral every 6 hours PRN Moderate Pain (4 - 6)  oxycodone    5 mG/acetaminophen 325 mG 2 Tablet(s) Oral every 6 hours PRN Severe Pain (7 - 10)      Vital Signs Last 24 Hrs  T(C): 36.8 (21 Jul 2021 12:02), Max: 37.1 (21 Jul 2021 05:04)  T(F): 98.2 (21 Jul 2021 12:02), Max: 98.8 (21 Jul 2021 05:04)  HR: 89 (21 Jul 2021 14:10) (79 - 113)  BP: 94/64 (21 Jul 2021 14:10) (89/57 - 127/77)  BP(mean): --  RR: 18 (21 Jul 2021 12:02) (18 - 18)  SpO2: 92% (21 Jul 2021 12:02) (92% - 93%)    PHYSICAL EXAM:  GENERAL: NAD, well-developed  HEAD:  Atraumatic, Normocephalic  EYES: EOMI, PERRLA, conjunctiva and sclera clear  NECK: Supple, No JVD  CHEST/LUNG: Clear to auscultation bilaterally; No wheeze Sternal wound clean  HEART: Regular rate and rhythm; No murmurs, rubs, or gallops  ABDOMEN: Soft, Nontender, Nondistended; Bowel sounds present  EXTREMITIES:  2+ Peripheral Pulses, No clubbing, cyanosis, or edema L arm wound healing.  PSYCH: AAOx3  NEUROLOGY: non-focal  SKIN: No rashes or lesions    LABS:                        12.4   13.29 )-----------( 164      ( 21 Jul 2021 05:43 )             36.2     07-21    133<L>  |  99  |  25<H>  ----------------------------<  99  3.8   |  22  |  1.09    Ca    9.3      21 Jul 2021 05:41  Phos  2.6     07-20  Mg     2.0     07-20    TPro  5.7<L>  /  Alb  3.1<L>  /  TBili  0.9  /  DBili  x   /  AST  32  /  ALT  46<H>  /  AlkPhos  92  07-20                RADIOLOGY & ADDITIONAL TESTS:    Imaging Personally Reviewed:    Consultant(s) Notes Reviewed:      Care Discussed with Consultants/Other Providers:

## 2021-07-21 NOTE — PROGRESS NOTE ADULT - PROBLEM SELECTOR PLAN 1
No BB 2/2 severe COPD w/ hypoxemia    ck o2 sats on room air  + prednisone   per pulmonary    Continue on Cardizem 30 mg PO Q6 hours for radial graft  D/C plan home PT on O2 7/22

## 2021-07-21 NOTE — PROGRESS NOTE ADULT - ASSESSMENT
77y/o  female with COPD , HTN and PVD recent stress test that was abnormal on 6/22/21 now s/p CABG on 7/15     Hyponatremia(mild now)-    Non oliguric ZAC -  renal fxn resolving  s/p CABG on 7/15    RECOMMEND:  No indication  for salt tablets for now ;  Serum sodium is of little significance   Monitor Cr/NA  for now   Encourage protein intake   She remains on oxygen at present- Pulm follow up;  The prednisone will increase the BUN   Incentive spirometry     Sayed [facility]   [phone]

## 2021-07-22 ENCOUNTER — TRANSCRIPTION ENCOUNTER (OUTPATIENT)
Age: 76
End: 2021-07-22

## 2021-07-22 VITALS — DIASTOLIC BLOOD PRESSURE: 69 MMHG | HEART RATE: 97 BPM | SYSTOLIC BLOOD PRESSURE: 105 MMHG

## 2021-07-22 LAB
ANION GAP SERPL CALC-SCNC: 12 MMOL/L — SIGNIFICANT CHANGE UP (ref 5–17)
BUN SERPL-MCNC: 20 MG/DL — SIGNIFICANT CHANGE UP (ref 7–23)
CALCIUM SERPL-MCNC: 8.7 MG/DL — SIGNIFICANT CHANGE UP (ref 8.4–10.5)
CHLORIDE SERPL-SCNC: 97 MMOL/L — SIGNIFICANT CHANGE UP (ref 96–108)
CO2 SERPL-SCNC: 22 MMOL/L — SIGNIFICANT CHANGE UP (ref 22–31)
CREAT SERPL-MCNC: 0.99 MG/DL — SIGNIFICANT CHANGE UP (ref 0.5–1.3)
GLUCOSE SERPL-MCNC: 85 MG/DL — SIGNIFICANT CHANGE UP (ref 70–99)
HCT VFR BLD CALC: 37 % — SIGNIFICANT CHANGE UP (ref 34.5–45)
HGB BLD-MCNC: 12.3 G/DL — SIGNIFICANT CHANGE UP (ref 11.5–15.5)
MAGNESIUM SERPL-MCNC: 2.4 MG/DL — SIGNIFICANT CHANGE UP (ref 1.6–2.6)
MCHC RBC-ENTMCNC: 29.9 PG — SIGNIFICANT CHANGE UP (ref 27–34)
MCHC RBC-ENTMCNC: 33.2 GM/DL — SIGNIFICANT CHANGE UP (ref 32–36)
MCV RBC AUTO: 90 FL — SIGNIFICANT CHANGE UP (ref 80–100)
NRBC # BLD: 0 /100 WBCS — SIGNIFICANT CHANGE UP (ref 0–0)
PLATELET # BLD AUTO: 179 K/UL — SIGNIFICANT CHANGE UP (ref 150–400)
POTASSIUM SERPL-MCNC: 4.4 MMOL/L — SIGNIFICANT CHANGE UP (ref 3.5–5.3)
POTASSIUM SERPL-SCNC: 4.4 MMOL/L — SIGNIFICANT CHANGE UP (ref 3.5–5.3)
RBC # BLD: 4.11 M/UL — SIGNIFICANT CHANGE UP (ref 3.8–5.2)
RBC # FLD: 15.1 % — HIGH (ref 10.3–14.5)
SODIUM SERPL-SCNC: 131 MMOL/L — LOW (ref 135–145)
WBC # BLD: 13.68 K/UL — HIGH (ref 3.8–10.5)
WBC # FLD AUTO: 13.68 K/UL — HIGH (ref 3.8–10.5)

## 2021-07-22 PROCEDURE — 82550 ASSAY OF CK (CPK): CPT

## 2021-07-22 PROCEDURE — 71045 X-RAY EXAM CHEST 1 VIEW: CPT

## 2021-07-22 PROCEDURE — C1894: CPT

## 2021-07-22 PROCEDURE — 85018 HEMOGLOBIN: CPT

## 2021-07-22 PROCEDURE — 86901 BLOOD TYPING SEROLOGIC RH(D): CPT

## 2021-07-22 PROCEDURE — C1751: CPT

## 2021-07-22 PROCEDURE — 85027 COMPLETE CBC AUTOMATED: CPT

## 2021-07-22 PROCEDURE — 84134 ASSAY OF PREALBUMIN: CPT

## 2021-07-22 PROCEDURE — 81001 URINALYSIS AUTO W/SCOPE: CPT

## 2021-07-22 PROCEDURE — 80053 COMPREHEN METABOLIC PANEL: CPT

## 2021-07-22 PROCEDURE — U0005: CPT

## 2021-07-22 PROCEDURE — C1769: CPT

## 2021-07-22 PROCEDURE — 82435 ASSAY OF BLOOD CHLORIDE: CPT

## 2021-07-22 PROCEDURE — 97162 PT EVAL MOD COMPLEX 30 MIN: CPT

## 2021-07-22 PROCEDURE — 85014 HEMATOCRIT: CPT

## 2021-07-22 PROCEDURE — 84484 ASSAY OF TROPONIN QUANT: CPT

## 2021-07-22 PROCEDURE — 83935 ASSAY OF URINE OSMOLALITY: CPT

## 2021-07-22 PROCEDURE — 81003 URINALYSIS AUTO W/O SCOPE: CPT

## 2021-07-22 PROCEDURE — 93458 L HRT ARTERY/VENTRICLE ANGIO: CPT

## 2021-07-22 PROCEDURE — 36430 TRANSFUSION BLD/BLD COMPNT: CPT

## 2021-07-22 PROCEDURE — 99153 MOD SED SAME PHYS/QHP EA: CPT

## 2021-07-22 PROCEDURE — 80048 BASIC METABOLIC PNL TOTAL CA: CPT

## 2021-07-22 PROCEDURE — C1729: CPT

## 2021-07-22 PROCEDURE — 85576 BLOOD PLATELET AGGREGATION: CPT

## 2021-07-22 PROCEDURE — 83735 ASSAY OF MAGNESIUM: CPT

## 2021-07-22 PROCEDURE — 93005 ELECTROCARDIOGRAM TRACING: CPT

## 2021-07-22 PROCEDURE — 86891 AUTOLOGOUS BLOOD OP SALVAGE: CPT

## 2021-07-22 PROCEDURE — P9047: CPT

## 2021-07-22 PROCEDURE — 83605 ASSAY OF LACTIC ACID: CPT

## 2021-07-22 PROCEDURE — 82947 ASSAY GLUCOSE BLOOD QUANT: CPT

## 2021-07-22 PROCEDURE — 94640 AIRWAY INHALATION TREATMENT: CPT

## 2021-07-22 PROCEDURE — 94010 BREATHING CAPACITY TEST: CPT

## 2021-07-22 PROCEDURE — 85610 PROTHROMBIN TIME: CPT

## 2021-07-22 PROCEDURE — 82803 BLOOD GASES ANY COMBINATION: CPT

## 2021-07-22 PROCEDURE — 94002 VENT MGMT INPAT INIT DAY: CPT

## 2021-07-22 PROCEDURE — C1887: CPT

## 2021-07-22 PROCEDURE — C1889: CPT

## 2021-07-22 PROCEDURE — 87641 MR-STAPH DNA AMP PROBE: CPT

## 2021-07-22 PROCEDURE — 83036 HEMOGLOBIN GLYCOSYLATED A1C: CPT

## 2021-07-22 PROCEDURE — 86769 SARS-COV-2 COVID-19 ANTIBODY: CPT

## 2021-07-22 PROCEDURE — 86850 RBC ANTIBODY SCREEN: CPT

## 2021-07-22 PROCEDURE — 82533 TOTAL CORTISOL: CPT

## 2021-07-22 PROCEDURE — P9041: CPT

## 2021-07-22 PROCEDURE — 82553 CREATINE MB FRACTION: CPT

## 2021-07-22 PROCEDURE — 83520 IMMUNOASSAY QUANT NOS NONAB: CPT

## 2021-07-22 PROCEDURE — 86923 COMPATIBILITY TEST ELECTRIC: CPT

## 2021-07-22 PROCEDURE — 93306 TTE W/DOPPLER COMPLETE: CPT

## 2021-07-22 PROCEDURE — 85384 FIBRINOGEN ACTIVITY: CPT

## 2021-07-22 PROCEDURE — 84295 ASSAY OF SERUM SODIUM: CPT

## 2021-07-22 PROCEDURE — 99232 SBSQ HOSP IP/OBS MODERATE 35: CPT

## 2021-07-22 PROCEDURE — P9045: CPT

## 2021-07-22 PROCEDURE — 84100 ASSAY OF PHOSPHORUS: CPT

## 2021-07-22 PROCEDURE — 97116 GAIT TRAINING THERAPY: CPT

## 2021-07-22 PROCEDURE — 83880 ASSAY OF NATRIURETIC PEPTIDE: CPT

## 2021-07-22 PROCEDURE — 87640 STAPH A DNA AMP PROBE: CPT

## 2021-07-22 PROCEDURE — 85025 COMPLETE CBC W/AUTO DIFF WBC: CPT

## 2021-07-22 PROCEDURE — U0003: CPT

## 2021-07-22 PROCEDURE — 84300 ASSAY OF URINE SODIUM: CPT

## 2021-07-22 PROCEDURE — 84132 ASSAY OF SERUM POTASSIUM: CPT

## 2021-07-22 PROCEDURE — 82436 ASSAY OF URINE CHLORIDE: CPT

## 2021-07-22 PROCEDURE — 71250 CT THORAX DX C-: CPT

## 2021-07-22 PROCEDURE — 86900 BLOOD TYPING SEROLOGIC ABO: CPT

## 2021-07-22 PROCEDURE — P9016: CPT

## 2021-07-22 PROCEDURE — 85396 CLOTTING ASSAY WHOLE BLOOD: CPT

## 2021-07-22 PROCEDURE — 93930 UPPER EXTREMITY STUDY: CPT

## 2021-07-22 PROCEDURE — 99152 MOD SED SAME PHYS/QHP 5/>YRS: CPT

## 2021-07-22 PROCEDURE — 84443 ASSAY THYROID STIM HORMONE: CPT

## 2021-07-22 PROCEDURE — 82565 ASSAY OF CREATININE: CPT

## 2021-07-22 PROCEDURE — 85730 THROMBOPLASTIN TIME PARTIAL: CPT

## 2021-07-22 PROCEDURE — 82962 GLUCOSE BLOOD TEST: CPT

## 2021-07-22 PROCEDURE — 82330 ASSAY OF CALCIUM: CPT

## 2021-07-22 PROCEDURE — 80061 LIPID PANEL: CPT

## 2021-07-22 RX ORDER — DILTIAZEM HCL 120 MG
1 CAPSULE, EXT RELEASE 24 HR ORAL
Qty: 60 | Refills: 0
Start: 2021-07-22

## 2021-07-22 RX ORDER — ACETAMINOPHEN 500 MG
2 TABLET ORAL
Qty: 0 | Refills: 0 | DISCHARGE
Start: 2021-07-22

## 2021-07-22 RX ORDER — TELMISARTAN AND HYDROCHLOROTHIAZIDE 40; 12.5 MG/1; MG/1
1 TABLET ORAL
Qty: 0 | Refills: 0 | DISCHARGE

## 2021-07-22 RX ORDER — MAGNESIUM SULFATE 500 MG/ML
1 VIAL (ML) INJECTION ONCE
Refills: 0 | Status: COMPLETED | OUTPATIENT
Start: 2021-07-22 | End: 2021-07-22

## 2021-07-22 RX ORDER — SENNA PLUS 8.6 MG/1
2 TABLET ORAL
Qty: 30 | Refills: 0
Start: 2021-07-22

## 2021-07-22 RX ORDER — NICOTINE POLACRILEX 2 MG
1 GUM BUCCAL
Qty: 14 | Refills: 1
Start: 2021-07-22

## 2021-07-22 RX ORDER — DIPYRIDAMOLE 50 MG
1 TABLET ORAL
Qty: 0 | Refills: 0 | DISCHARGE

## 2021-07-22 RX ORDER — DILTIAZEM HCL 120 MG
30 CAPSULE, EXT RELEASE 24 HR ORAL EVERY 8 HOURS
Refills: 0 | Status: DISCONTINUED | OUTPATIENT
Start: 2021-07-22 | End: 2021-07-22

## 2021-07-22 RX ORDER — ATORVASTATIN CALCIUM 80 MG/1
1 TABLET, FILM COATED ORAL
Qty: 30 | Refills: 0
Start: 2021-07-22

## 2021-07-22 RX ORDER — CILOSTAZOL 100 MG/1
1 TABLET ORAL
Qty: 0 | Refills: 0 | DISCHARGE

## 2021-07-22 RX ORDER — DILTIAZEM HCL 120 MG
1 CAPSULE, EXT RELEASE 24 HR ORAL
Qty: 0 | Refills: 0 | DISCHARGE
Start: 2021-07-22

## 2021-07-22 RX ADMIN — ENOXAPARIN SODIUM 40 MILLIGRAM(S): 100 INJECTION SUBCUTANEOUS at 12:17

## 2021-07-22 RX ADMIN — Medication 30 MILLIGRAM(S): at 14:14

## 2021-07-22 RX ADMIN — Medication 1 PATCH: at 12:18

## 2021-07-22 RX ADMIN — Medication 1 PATCH: at 14:06

## 2021-07-22 RX ADMIN — PANTOPRAZOLE SODIUM 40 MILLIGRAM(S): 20 TABLET, DELAYED RELEASE ORAL at 05:58

## 2021-07-22 RX ADMIN — Medication 81 MILLIGRAM(S): at 12:18

## 2021-07-22 RX ADMIN — Medication 650 MILLIGRAM(S): at 05:59

## 2021-07-22 RX ADMIN — Medication 40 MILLIGRAM(S): at 05:58

## 2021-07-22 RX ADMIN — FLUTICASONE FUROATE, UMECLIDINIUM BROMIDE AND VILANTEROL TRIFENATATE 1 PUFF(S): 200; 62.5; 25 POWDER RESPIRATORY (INHALATION) at 14:20

## 2021-07-22 RX ADMIN — Medication 1 PATCH: at 07:08

## 2021-07-22 RX ADMIN — Medication 650 MILLIGRAM(S): at 06:31

## 2021-07-22 RX ADMIN — CHLORHEXIDINE GLUCONATE 1 APPLICATION(S): 213 SOLUTION TOPICAL at 10:12

## 2021-07-22 RX ADMIN — GABAPENTIN 300 MILLIGRAM(S): 400 CAPSULE ORAL at 05:59

## 2021-07-22 RX ADMIN — Medication 3 MILLILITER(S): at 05:59

## 2021-07-22 RX ADMIN — Medication 3 MILLILITER(S): at 14:14

## 2021-07-22 RX ADMIN — Medication 100 GRAM(S): at 01:20

## 2021-07-22 NOTE — DISCHARGE NOTE PROVIDER - NSDCPNSUBOBJ_GEN_ALL_CORE
General: WN/WD NAD  Neurology: A&Ox3, nonfocal, COTO x 4  Respiratory: CTA B/L  CV: RRR, S1S2, no murmur  Abdominal: Soft, NT, ND no palpable mass  MSK: No edema, + peripheral pulses, FROM all 4 extremity  Incisions: intact, no erythema or drainage  Left forearm CDI    Vital Signs Last 24 Hrs  T(C): 37.4 (22 Jul 2021 05:38), Max: 37.4 (22 Jul 2021 05:38)  T(F): 99.3 (22 Jul 2021 05:38), Max: 99.3 (22 Jul 2021 05:38)  HR: 97 (22 Jul 2021 14:15) (77 - 97)  BP: 105/69 (22 Jul 2021 14:15) (97/60 - 124/70)  BP(mean): --  RR: 18 (22 Jul 2021 05:38) (18 - 18)  SpO2: 94% (22 Jul 2021 13:23) (91% - 94%)

## 2021-07-22 NOTE — DISCHARGE NOTE PROVIDER - NSDCFUADDAPPT_GEN_ALL_CORE_FT
FU Dr Cole July 29th @ 1:30pm.  Call [phone] with questions or concerns  FU Dr Guzman within 2 weeks  FU Dr Klein or your own pulmonologist within 2 weeks.  Will need repeat CT Chest in 6 months

## 2021-07-22 NOTE — DISCHARGE NOTE PROVIDER - CARE PROVIDER_API CALL
Isai Cole)  Surgery; Thoracic and Cardiac Surgery  [address] [place] [address]  Phone: [phone]  Fax: [phone]  Follow Up Time:     Isrrael Klein)  Internal Medicine; Pulmonary Disease  [address], [address]  [place] [address]  Phone: [phone]  Fax: [phone]  Follow Up Time:     Jez Guzman  CARDIOVASCULAR DISEASE  [address], [address]  [place] [address]  Phone: [phone]  Fax: [phone]  Follow Up Time:

## 2021-07-22 NOTE — PROGRESS NOTE ADULT - SUBJECTIVE AND OBJECTIVE BOX
Patient is a 76y old  Female who presents with a chief complaint of sob (22 Jul 2021 05:40)      SUBJECTIVE / OVERNIGHT EVENTS: Comfortable without new complaints. Son at bedside.  Review of Systems  chest pain no  palpitations no  sob no  nausea no  headache no    MEDICATIONS  (STANDING):  albuterol/ipratropium for Nebulization 3 milliLiter(s) Nebulizer every 8 hours  aspirin enteric coated 81 milliGRAM(s) Oral daily  atorvastatin 40 milliGRAM(s) Oral at bedtime  bisacodyl Suppository 10 milliGRAM(s) Rectal once  chlorhexidine 2% Cloths 1 Application(s) Topical <User Schedule>  diltiazem    Tablet 30 milliGRAM(s) Oral every 8 hours  enoxaparin Injectable 40 milliGRAM(s) SubCutaneous daily  fluticasone furoate/umeclidinium/vilanterol 100-62.5-25 MICROgram(s) Inhaler 1 Puff(s) Inhalation daily  gabapentin 300 milliGRAM(s) Oral every 12 hours  nicotine - 21 mG/24Hr(s) Patch 1 patch Transdermal daily  pantoprazole    Tablet 40 milliGRAM(s) Oral before breakfast  polyethylene glycol 3350 17 Gram(s) Oral daily  predniSONE   Tablet 40 milliGRAM(s) Oral daily  senna 2 Tablet(s) Oral at bedtime  sodium chloride 0.9%. 1000 milliLiter(s) (10 mL/Hr) IV Continuous <Continuous>  tamsulosin 0.4 milliGRAM(s) Oral at bedtime    MEDICATIONS  (PRN):  acetaminophen   Tablet .. 650 milliGRAM(s) Oral every 6 hours PRN Mild Pain (1 - 3)  aluminum hydroxide/magnesium hydroxide/simethicone Suspension 30 milliLiter(s) Oral every 6 hours PRN Dyspepsia  oxycodone    5 mG/acetaminophen 325 mG 1 Tablet(s) Oral every 6 hours PRN Moderate Pain (4 - 6)  oxycodone    5 mG/acetaminophen 325 mG 2 Tablet(s) Oral every 6 hours PRN Severe Pain (7 - 10)      Vital Signs Last 24 Hrs  T(C): 37.4 (22 Jul 2021 05:38), Max: 37.4 (22 Jul 2021 05:38)  T(F): 99.3 (22 Jul 2021 05:38), Max: 99.3 (22 Jul 2021 05:38)  HR: 97 (22 Jul 2021 14:15) (77 - 97)  BP: 105/69 (22 Jul 2021 14:15) (97/60 - 124/70)  BP(mean): --  RR: 18 (22 Jul 2021 05:38) (18 - 18)  SpO2: 94% (22 Jul 2021 13:23) (91% - 94%)    PHYSICAL EXAM:  GENERAL: NAD  HEAD:  Atraumatic, Normocephalic  EYES: EOMI, PERRLA, conjunctiva and sclera clear  NECK: Supple, No JVD  CHEST/LUNG: Clear to auscultation bilaterally; No wheeze Sternal wound healing  HEART: Regular rate and rhythm; No murmurs, rubs, or gallops  ABDOMEN: Soft, Nontender, Nondistended; Bowel sounds present  EXTREMITIES:  2+ Peripheral Pulses, No clubbing, cyanosis, or edema L arm wound healing.  PSYCH: AAOx3  NEUROLOGY: non-focal  SKIN: No rashes or lesions    LABS:                        12.3   13.68 )-----------( 179      ( 22 Jul 2021 06:47 )             37.0     07-22    131<L>  |  97  |  20  ----------------------------<  85  4.4   |  22  |  0.99    Ca    8.7      22 Jul 2021 07:07  Mg     2.4     07-22                  RADIOLOGY & ADDITIONAL TESTS:    Imaging Personally Reviewed:    Consultant(s) Notes Reviewed:      Care Discussed with Consultants/Other Providers:

## 2021-07-22 NOTE — PROGRESS NOTE ADULT - NSICDXPILOT_GEN_ALL_CORE
[place]

## 2021-07-22 NOTE — PROGRESS NOTE ADULT - SUBJECTIVE AND OBJECTIVE BOX
NEPHROLOGY-NS[facility] [phone]        Patient seen and examined in bed.  She was about the same and still on oxygen         MEDICATIONS  (STANDING):  albuterol/ipratropium for Nebulization 3 milliLiter(s) Nebulizer every 8 hours  aspirin enteric coated 81 milliGRAM(s) Oral daily  atorvastatin 40 milliGRAM(s) Oral at bedtime  bisacodyl Suppository 10 milliGRAM(s) Rectal once  chlorhexidine 2% Cloths 1 Application(s) Topical <User Schedule>  diltiazem    Tablet 30 milliGRAM(s) Oral every 8 hours  enoxaparin Injectable 40 milliGRAM(s) SubCutaneous daily  fluticasone furoate/umeclidinium/vilanterol 100-62.5-25 MICROgram(s) Inhaler 1 Puff(s) Inhalation daily  gabapentin 300 milliGRAM(s) Oral every 12 hours  nicotine - 21 mG/24Hr(s) Patch 1 patch Transdermal daily  pantoprazole    Tablet 40 milliGRAM(s) Oral before breakfast  polyethylene glycol 3350 17 Gram(s) Oral daily  predniSONE   Tablet 40 milliGRAM(s) Oral daily  senna 2 Tablet(s) Oral at bedtime  sodium chloride 0.9%. 1000 milliLiter(s) (10 mL/Hr) IV Continuous <Continuous>  tamsulosin 0.4 milliGRAM(s) Oral at bedtime      VITAL:  T(C): , Max: 37.4 (07-22-21 @ 05:38)  T(F): , Max: 99.3 (07-22-21 @ 05:38)  HR: 87 (07-22-21 @ 05:38)  BP: 105/64 (07-22-21 @ 05:38)  BP(mean): --  RR: 18 (07-22-21 @ 05:38)  SpO2: 91% (07-22-21 @ 05:38)  Wt(kg): --    I and O's:    07-21 @ 07:01  -  07-22 @ 07:00  --------------------------------------------------------  IN: 1040 mL / OUT: 1150 mL / NET: -110 mL    07-22 @ 07:01  -  07-22 @ 11:18  --------------------------------------------------------  IN: 400 mL / OUT: 400 mL / NET: 0 mL          PHYSICAL EXAM:    Constitutional: NAD  Neck:  No JVD  Respiratory: reduced   Cardiovascular: S1 and S2  Gastrointestinal: BS+, soft, NT/ND  Extremities: No peripheral edema  Neurological: A/O x 3, no focal deficits  Psychiatric: Normal mood, normal affect  : No Levy  Skin: No rashes  Access: Not applicable    LABS:                        12.3   13.68 )-----------( 179      ( 22 Jul 2021 06:47 )             37.0     07-22    131<L>  |  97  |  20  ----------------------------<  85  4.4   |  22  |  0.99    Ca    8.7      22 Jul 2021 07:07  Mg     2.4     07-22            Urine Studies:          RADIOLOGY & ADDITIONAL STUDIES:

## 2021-07-22 NOTE — DISCHARGE NOTE PROVIDER - HOSPITAL COURSE
76 year old female current everyday 3ppd smoker on nicotine patch Hx COPD, PVD, CAD presents to [facility] for LHC, as preop testing prior to PAD procedure. S/p LHC revealing TVD, CT surgery consulted for CABG eval.     7/9/21 CT Surgery evaluation in progress. Preoperative work up in progress. Dr. Cole to review angiogram findings.   7/15/21  S/P  C2L w/ L radial harvest,ef 45  Acute blood loss anemia- prbc- basleline hct elevated from smoking hx- 2u intraop and post op  SB- pacing- now nsr  Hypoxia- copd --> Pulmonary following   Cardizem for radial artery graft  7/16 Transferred to SDU on 6 Liters NC 02.   7/17 VVS; PW x 4 D/C'd.  Right IJ D/C'D and MS CT removed.  Continue on 6 Liters NC 02 placed on humidified air 02 SAT 88-89%.  Pulmonary following --> continue on home inhaler Trelegy inh 1 puff daily and Douneb neb Q6. No BB 2/2 severe COPD with hypoxemia.    7/19  5 l NC  sats 90 percent   OOB ambulating  neg  540 cc   wbc 18  no ss infection     7/20   5 l  nc  02   sat   94    prednisone  per  kassidy,     neg   one liter lasix 40 qd   7/21 SR 70's 92-94% 5L  Cont pred.  -370cc/24 hrs  7/22 HD stable 91-92% on 5 L.  DC home on O2 keep O2 >90%

## 2021-07-22 NOTE — PROGRESS NOTE ADULT - ASSESSMENT
pt  is a 75y/o  female with PMHX of Current tobacco smoker on Nicotine patch , COPD , HTN and PVD.   Pt had recent stress test that was abnormal on 6/22/21    s/p cabg X2  Postop care  hyponatremia resolved  renal f/u  copd  pulm follow  need for supplemental O2  c/w outpt meds  PVD stable  PT  smoking cessation  dvt proph   gi proph   d/w patient and son    Jovani Boyce MD pager [phone]

## 2021-07-22 NOTE — PROGRESS NOTE ADULT - ASSESSMENT
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 Pulmonology consulted for possible COPD. Pt now on 2L O2 but may not be needed, not currently in exacerbation.     Problems:  COPD        Recommendation  - Continue trelegy 1 puff daily which pt has at bedside, please have RN bring down to pharmacy so can document in EMR that pt is actually taking  - Albuterol nebulizer q6h PRN  - Maintain O2 saturation > 88%, if she does not desaturate with ambulation then no need for O2/ acapella/incentive spirometry  *****************  7/13-mucusy in am--surgery 7/15                           (see below)  7/14-better today  7/15-ready for surgery-no new resp sx.  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 Pulmonology consulted for possible COPD. Pt now on 2L O2 but may not be needed, not currently in exacerbation.     Problems:  COPD        Recommendation  - Continue trelegy 1 puff daily which pt has at bedside, please have RN bring down to pharmacy so can document in EMR that pt is actually taking  - Albuterol nebulizer q6h PRN  - Maintain O2 saturation > 88%, if she does not desaturate with ambulation then no need for O2/ acapella/incentive spirometry  *****************  7/13-mucusy in am--surgery 7/15                           (see below)  7/14-better today  7/15-ready for surgery-no new resp sx.  7/16-s/p OHS-extubated to NC  7/19-more cough/wheeze and hemoptysis  7/20-still tight-no hemoptysis  7/21-poor night -reflux #1 issue  7/22-better over all

## 2021-07-22 NOTE — DISCHARGE NOTE PROVIDER - NSDCMRMEDTOKEN_GEN_ALL_CORE_FT
acetaminophen 325 mg oral tablet: 2 tab(s) orally every 6 hours, As needed, Mild Pain (1 - 3)  Albuterol (Eqv-ProAir HFA) 90 mcg/inh inhalation aerosol: 2 puff(s) inhaled every 6 hours  Aspir 81 oral delayed release tablet: 1 tab(s) orally once a day  atorvastatin 40 mg oral tablet: 1 tab(s) orally once a day (at bedtime)  dilTIAZem 30 mg oral tablet: 1 tab(s) orally every 8 hours  gabapentin 300 mg oral tablet: 1 tab(s) orally 2 times a day  nicotine 21 mg/24 hr transdermal film, extended release: 1 patch transdermal once a day   oxycodone-acetaminophen 5 mg-325 mg oral tablet: 1 tab(s) orally every 6 hours, As needed, Moderate Pain (4 - 6) MDD:4  Pepcid 20 mg oral tablet: 1 tab(s) orally 2 times a day, As Needed  predniSONE 20 mg oral tablet: 2 tab(s) orally once a day x 3days  then 1.5 tabs orally once a day x 3 days then 1 tab orally x 3 days then 1/2 tab orally once a day x 3 days then stop  senna oral tablet: 2 tab(s) orally once a day (at bedtime)  tamsulosin 0.4 mg oral capsule: 1 cap(s) orally once a day  Trelegy Ellipta 100 mcg-62.5 mcg-25 mcg/inh inhalation powder: 1 puff(s) inhaled once a day

## 2021-07-22 NOTE — CHART NOTE - NSCHARTNOTESELECT_GEN_ALL_CORE
abnormal echo/Event Note
CABG/Event Note
CTS/Event Note
Event Note
Nutrition Services

## 2021-07-22 NOTE — DISCHARGE NOTE NURSING/CASE MANAGEMENT/SOCIAL WORK - NSDCPEWEB_GEN_ALL_CORE
[facility] for Tobacco Control website --- [email or web address] --- [email or web address]

## 2021-07-22 NOTE — CHART NOTE - NSCHARTNOTEFT_GEN_A_CORE
Nutrition Follow Up Note  Patient seen for: length of stay follow up.    Chart reviewed, events noted. Pt is a 76 year old female current everyday 3ppd smoker on nicotine patch Hx COPD, PVD, CAD presents to [facility] for LHC, as preop testing prior to PAD procedure. S/p LHC revealing TVD s/p CABG x 2 7/15.    Source: [x] Patient       [x] EMR        [] RN        [x] Family at bedside       [] Other:    -If unable to interview patient: [] Trach/Vent/BiPAP  [] Disoriented/confused/inappropriate to interview    Diet Order:   Diet, Regular:   DASH/TLC {Sodium & Cholesterol Restricted} (DASH) (21)    - Is current order appropriate/adequate? [x] Yes  []  No:     - PO intake :   [x] >75%  Adequate    [] 50-75%  Fair       [] <50%  Poor    - Nutrition-related concerns: Pt eating well without issue. Pt denies nausea, vomiting, diarrhea, or constipation.     GI:  Last BM yesterday.   Bowel Regimen? [x] Yes   [] No    Weights:   Daily Weight in k.4 (), Weight in k.4 (), Weight in k.1 (), Weight in k.1 (), Weight in k.7 (), Weight in k.7 ()    Nutritionally Pertinent MEDICATIONS  (STANDING):  atorvastatin  bisacodyl Suppository  diltiazem    Tablet  pantoprazole    Tablet  polyethylene glycol 3350  predniSONE   Tablet  senna  sodium chloride 0.9%.  tamsulosin    Pertinent Labs:  @ 07:07: Na 131<L>, BUN 20, Cr 0.99, BG 85, K+ 4.4, Phos --, Mg 2.4, Alk Phos --, ALT/SGPT --, AST/SGOT --, HbA1c --    A1C with Estimated Average Glucose Result: 5.4 % (21 @ 19:13)    Finger Sticks: n/a    Skin per nursing documentation: no pressure injuries;  +mid-sternal wound  Edema: No noted edema as per flow sheets.     Estimated Needs:   [] no change since previous assessment  [x] recalculated:     Previous Nutrition Diagnosis: Food and Nutrition Related Knowledge Deficit  Nutrition Diagnosis is: [x] ongoing  [] resolved [] not applicable     New Nutrition Diagnosis: [x] Not applicable    Nutrition Care Plan:  [x] In Progress - being addressed with education  [] Achieved  [] Not applicable    Nutrition Interventions:     Education Provided:       [x] Yes:  [] No: Provided education on heart healthy nutrition therapy. Recommended limited salt intake. Reviewed foods high in salt and amount of salt recommended per day. Discussed nutrition label reading. Stressed the importance of limited fried foods and saturated fat consumption. Encouraged adequate protein intake to promote wound healing after surgery. Handouts provided at bedside.    Recommendations:      1. Continue DASH/TLC diet.  2. Reinforce nutrition education as needed - RD remains available.     Monitoring and Evaluation:   Continue to monitor nutritional intake, tolerance to diet prescription, weights, labs, skin integrity    RD remains available upon request and will follow up per protocol    Nila Campbell MS, RD, CDN Pager# [phone]

## 2021-07-22 NOTE — DISCHARGE NOTE PROVIDER - NSDCCPCAREPLAN_GEN_ALL_CORE_FT
PRINCIPAL DISCHARGE DIAGNOSIS  Diagnosis: S/P CABG x 2  Assessment and Plan of Treatment: take meds as prescribed  ambulate at home at least 4x daily  use cough pillow and incentive spirometry  weigh yourself daily and report weight gain > 2.5 lbs in 24 hrs        SECONDARY DISCHARGE DIAGNOSES  Diagnosis: COPD exacerbation  Assessment and Plan of Treatment: resume pre hospital meds  O2 via nasal cannula  prednisone taper  FU pulmonary  Will need repeat CT chest in 6 months

## 2021-07-22 NOTE — DISCHARGE NOTE PROVIDER - PROVIDER TOKENS
PROVIDER:[TOKEN:[[id number]]],PROVIDER:[TOKEN:[[id number]]],PROVIDER:[TOKEN:[[id number]]]

## 2021-07-22 NOTE — PROGRESS NOTE ADULT - ATTENDING COMMENTS
as above- HB is #1 issue-poor sleep-s/p OHS 7/15- on NC O2--on prednisone 40mg D2  Multifactorial dyspnea--COPD-CB/emphysema (pt was seen by Dr. Montelongo in the office on 7/8   PFT showed a normal FEV1, TLC, DLCO of 68), CAD, debility--keep sat above 90%  COPD/CB/emphysejma--trelegy 1 q day, incentive spirometry, acapella, albuterol q 6; now w/ flair of copd vs ?CHF-***-added prednisone 40mg per           day D2                nicotine addiction-- smoking cessation advised.                                        ******GI-am PPI and add pepcid 40 q hs to regimen  lung CA screening---LDCT was to be done, will need f/up ct in 6 months  CAD s/p OHS 7/15; CT as per CTS- check BNP      DVT and GI prophylaxis     renal f/up                         DC planning    Isrrael Klein MD-Pulmonary   [phone] as above- better over all-s/p OHS 7/15- on NC O2--on prednisone 40mg D3  Multifactorial dyspnea--COPD-CB/emphysema (pt was seen by Dr. Montelongo in the office on 7/8   PFT showed a normal FEV1, TLC, DLCO of 68), CAD, debility--keep sat above 90%  COPD/CB/emphysejma--trelegy 1 q day, incentive spirometry, acapella, albuterol q 6; now w/ flair of copd vs ?CHF-***-added prednisone 40mg per           day D3                nicotine addiction-- smoking cessation advised.                                        ******GI-am PPI and  pepcid 40 q hs   lung CA screening---LDCT was to be done, will need f/up ct in 6 months  CAD s/p OHS 7/15; CT as per CTS- f/up BNP      DVT and GI prophylaxis     renal f/up                         DC planning (pred taper ;40 for 3 and drop by k10mg every 3 days to off)    Isrrael Klein MD-Pulmonary   [phone]

## 2021-07-22 NOTE — PROGRESS NOTE ADULT - PROVIDER SPECIALTY LIST ADULT
Cardiology
Internal Medicine
Nephrology
Pulmonology
Cardiology
Cardiology
Critical Care
Internal Medicine
Nephrology
Nephrology
Cardiology
Critical Care
Internal Medicine
Nephrology
Pulmonology
CT Surgery
Cardiology
Internal Medicine
Pulmonology
CT Surgery

## 2021-07-22 NOTE — DISCHARGE NOTE NURSING/CASE MANAGEMENT/SOCIAL WORK - NSDCPEEMAIL_GEN_ALL_CORE
[facility] for Tobacco Control email [email or web address]

## 2021-07-22 NOTE — PROGRESS NOTE ADULT - ASSESSMENT
77y/o  female with COPD , HTN and PVD recent stress test that was abnormal on 6/22/21 now s/p CABG on 7/15     Hyponatremia(mild now)-    Non oliguric ZAC -  renal fxn resolving  s/p CABG on 7/15    RECOMMEND:  No indication  for salt tablets for now ;  Serum sodium is of little significance   Monitor Cr/NA  for now   Encourage protein intake   She remains on oxygen at present- Pulm follow up;  The prednisone will increase the BUN   Incentive spirometry     DC planning    [employer] [facility]   [phone]

## 2021-07-22 NOTE — DISCHARGE NOTE NURSING/CASE MANAGEMENT/SOCIAL WORK - PATIENT PORTAL LINK FT
You can access the FollowMyHealth Patient Portal offered by [facility] by registering at the following website: [email or web address]. By joining [employer]’s FollowMyHealth portal, you will also be able to view your health information using other applications (apps) compatible with our system.

## 2021-07-22 NOTE — PROGRESS NOTE ADULT - SUBJECTIVE AND OBJECTIVE BOX
CHIEF COMPLAINT: f/up sob, preop resp, copd--tight chest/sob-heart burn is primary issue    Interval Events: nephro/cards f/up; s/p OHS 7/15, prednisone since 7/19    REVIEW OF SYSTEMS:  Constitutional: No fevers or chills. No weight loss. No fatigue or generalized malaise.  Eyes: No itching or discharge from the eyes  ENT: No ear pain. No ear discharge. No nasal congestion. No post nasal drip. No epistaxis. No throat pain. No sore throat. No difficulty swallowing.   CV: No chest pain. No palpitations. No lightheadedness or dizziness.   Resp: No dyspnea at rest. No dyspnea on exertion. No orthopnea. No wheezing. No cough. No stridor. No sputum production. No chest pain with respiration.  GI: No nausea. No vomiting. No diarrhea.  MSK: No joint pain or pain in any extremities  Integumentary: No skin lesions. No pedal edema.  Neurological: No gross motor weakness. No sensory changes.  [ ] All other systems negative  [ ] Unable to assess ROS because ________    OBJECTIVE:  ICU Vital Signs Last 24 Hrs  T(C): 37.4 (22 Jul 2021 05:38), Max: 37.4 (22 Jul 2021 05:38)  T(F): 99.3 (22 Jul 2021 05:38), Max: 99.3 (22 Jul 2021 05:38)  HR: 87 (22 Jul 2021 05:38) (77 - 113)  BP: 105/64 (22 Jul 2021 05:38) (89/57 - 124/70)  BP(mean): --  ABP: --  ABP(mean): --  RR: 18 (22 Jul 2021 05:38) (18 - 18)  SpO2: 91% (22 Jul 2021 05:38) (91% - 93%)        07-20 @ 07:01  -  07-21 @ 07:00  --------------------------------------------------------  IN: 780 mL / OUT: 1150 mL / NET: -370 mL    07-21 @ 07:01  -  07-22 @ 05:40  --------------------------------------------------------  IN: 920 mL / OUT: 850 mL / NET: 70 mL      CAPILLARY BLOOD GLUCOSE          PHYSICAL EXAM:  General: Awake, alert, oriented X 3.   HEENT: Atraumatic, normocephalic.                 Mallampatti Grade                 No nasal congestion.                No tonsillar or pharyngeal exudates.  Lymph Nodes: No palpable lymphadenopathy  Neck: No JVD. No carotid bruit.   Respiratory: Normal chest expansion                         Normal percussion                         Normal and equal air entry                         No wheeze, rhonchi or rales.  Cardiovascular: S1 S2 normal. No murmurs, rubs or gallops.   Abdomen: Soft, non-tender, non-distended. No organomegaly. Normoactive bowel sounds.  Extremities: Warm to touch. Peripheral pulse palpable. No pedal edema.   Skin: No rashes or skin lesions  Neurological: Motor and sensory examination equal and normal in all four extremities.  Psychiatry: Appropriate mood and affect.    HOSPITAL MEDICATIONS:  MEDICATIONS  (STANDING):  albuterol/ipratropium for Nebulization 3 milliLiter(s) Nebulizer every 8 hours  aspirin enteric coated 81 milliGRAM(s) Oral daily  atorvastatin 40 milliGRAM(s) Oral at bedtime  bisacodyl Suppository 10 milliGRAM(s) Rectal once  chlorhexidine 2% Cloths 1 Application(s) Topical <User Schedule>  diltiazem    Tablet 30 milliGRAM(s) Oral every 6 hours  enoxaparin Injectable 40 milliGRAM(s) SubCutaneous daily  fluticasone furoate/umeclidinium/vilanterol 100-62.5-25 MICROgram(s) Inhaler 1 Puff(s) Inhalation daily  furosemide    Tablet 40 milliGRAM(s) Oral daily  gabapentin 300 milliGRAM(s) Oral every 12 hours  nicotine - 21 mG/24Hr(s) Patch 1 patch Transdermal daily  pantoprazole    Tablet 40 milliGRAM(s) Oral before breakfast  polyethylene glycol 3350 17 Gram(s) Oral daily  predniSONE   Tablet 40 milliGRAM(s) Oral daily  senna 2 Tablet(s) Oral at bedtime  sodium chloride 0.9%. 1000 milliLiter(s) (10 mL/Hr) IV Continuous <Continuous>  tamsulosin 0.4 milliGRAM(s) Oral at bedtime    MEDICATIONS  (PRN):  acetaminophen   Tablet .. 650 milliGRAM(s) Oral every 6 hours PRN Mild Pain (1 - 3)  aluminum hydroxide/magnesium hydroxide/simethicone Suspension 30 milliLiter(s) Oral every 6 hours PRN Dyspepsia  oxycodone    5 mG/acetaminophen 325 mG 1 Tablet(s) Oral every 6 hours PRN Moderate Pain (4 - 6)  oxycodone    5 mG/acetaminophen 325 mG 2 Tablet(s) Oral every 6 hours PRN Severe Pain (7 - 10)      LABS:                        12.4   13.29 )-----------( 164      ( 21 Jul 2021 05:43 )             36.2     07-21    133<L>  |  99  |  25<H>  ----------------------------<  99  3.8   |  22  |  1.09    Ca    9.3      21 Jul 2021 05:41                MICROBIOLOGY:     RADIOLOGY:  [ ] Reviewed and interpreted by me    Point of Care Ultrasound Findings:    PFT:    EKG: CHIEF COMPLAINT: f/up sob, preop resp, copd--better over all-less pain and HB    Interval Events: nephro/cards f/up; s/p OHS 7/15, prednisone since 7/19    REVIEW OF SYSTEMS:  Constitutional: No fevers or chills. No weight loss. + fatigue or generalized malaise.  Eyes: No itching or discharge from the eyes  ENT: No ear pain. No ear discharge. No nasal congestion. No post nasal drip. No epistaxis. No throat pain. No sore throat. No difficulty swallowing.   CV: No chest pain. No palpitations. No lightheadedness or dizziness.   Resp: No dyspnea at rest. + dyspnea on exertion. No orthopnea. + wheezing. No cough. No stridor. No sputum production. No chest pain with respiration.  GI: No nausea. No vomiting. No diarrhea.  MSK: No joint pain or pain in any extremities  Integumentary: No skin lesions. No pedal edema.  Neurological: No gross motor weakness. No sensory changes.  [+ ] All other systems negative  [ ] Unable to assess ROS because ________    OBJECTIVE:  ICU Vital Signs Last 24 Hrs  T(C): 37.4 (22 Jul 2021 05:38), Max: 37.4 (22 Jul 2021 05:38)  T(F): 99.3 (22 Jul 2021 05:38), Max: 99.3 (22 Jul 2021 05:38)  HR: 87 (22 Jul 2021 05:38) (77 - 113)  BP: 105/64 (22 Jul 2021 05:38) (89/57 - 124/70)  BP(mean): --  ABP: --  ABP(mean): --  RR: 18 (22 Jul 2021 05:38) (18 - 18)  SpO2: 91% (22 Jul 2021 05:38) (91% - 93%)        07-20 @ 07:01  -  07-21 @ 07:00  --------------------------------------------------------  IN: 780 mL / OUT: 1150 mL / NET: -370 mL    07-21 @ 07:01  -  07-22 @ 05:40  --------------------------------------------------------  IN: 920 mL / OUT: 850 mL / NET: 70 mL      CAPILLARY BLOOD GLUCOSE          PHYSICAL EXAM:  General: Awake, alert, oriented X 3.   HEENT: Atraumatic, normocephalic.                 Mallampatti Grade 2                No nasal congestion.                No tonsillar or pharyngeal exudates.  Lymph Nodes: No palpable lymphadenopathy  Neck: No JVD. No carotid bruit.   Respiratory: Normal chest expansion                         Normal percussion                         Normal and equal air entry                         mild exp wheeze, no rhonchi or rales.  Cardiovascular: S1 S2 normal. No murmurs, rubs or gallops.   Abdomen: Soft, non-tender, non-distended. No organomegaly. Normoactive bowel sounds.  Extremities: Warm to touch. Peripheral pulse palpable. No pedal edema.   Skin: No rashes or skin lesions  Neurological: Motor and sensory examination equal and normal in all four extremities.  Psychiatry: Appropriate mood and affect.    HOSPITAL MEDICATIONS:  MEDICATIONS  (STANDING):  albuterol/ipratropium for Nebulization 3 milliLiter(s) Nebulizer every 8 hours  aspirin enteric coated 81 milliGRAM(s) Oral daily  atorvastatin 40 milliGRAM(s) Oral at bedtime  bisacodyl Suppository 10 milliGRAM(s) Rectal once  chlorhexidine 2% Cloths 1 Application(s) Topical <User Schedule>  diltiazem    Tablet 30 milliGRAM(s) Oral every 6 hours  enoxaparin Injectable 40 milliGRAM(s) SubCutaneous daily  fluticasone furoate/umeclidinium/vilanterol 100-62.5-25 MICROgram(s) Inhaler 1 Puff(s) Inhalation daily  furosemide    Tablet 40 milliGRAM(s) Oral daily  gabapentin 300 milliGRAM(s) Oral every 12 hours  nicotine - 21 mG/24Hr(s) Patch 1 patch Transdermal daily  pantoprazole    Tablet 40 milliGRAM(s) Oral before breakfast  polyethylene glycol 3350 17 Gram(s) Oral daily  predniSONE   Tablet 40 milliGRAM(s) Oral daily  senna 2 Tablet(s) Oral at bedtime  sodium chloride 0.9%. 1000 milliLiter(s) (10 mL/Hr) IV Continuous <Continuous>  tamsulosin 0.4 milliGRAM(s) Oral at bedtime    MEDICATIONS  (PRN):  acetaminophen   Tablet .. 650 milliGRAM(s) Oral every 6 hours PRN Mild Pain (1 - 3)  aluminum hydroxide/magnesium hydroxide/simethicone Suspension 30 milliLiter(s) Oral every 6 hours PRN Dyspepsia  oxycodone    5 mG/acetaminophen 325 mG 1 Tablet(s) Oral every 6 hours PRN Moderate Pain (4 - 6)  oxycodone    5 mG/acetaminophen 325 mG 2 Tablet(s) Oral every 6 hours PRN Severe Pain (7 - 10)      LABS:                        12.4   13.29 )-----------( 164      ( 21 Jul 2021 05:43 )             36.2     07-21    133<L>  |  99  |  25<H>  ----------------------------<  99  3.8   |  22  |  1.09    Ca    9.3      21 Jul 2021 05:41                MICROBIOLOGY:     RADIOLOGY:  [ ] Reviewed and interpreted by me    Point of Care Ultrasound Findings:    PFT:    EKG:

## 2021-07-22 NOTE — DISCHARGE NOTE PROVIDER - CARE PROVIDERS DIRECT ADDRESSES
[email or web address]

## 2021-07-23 RX ORDER — IPRATROPIUM BROMIDE AND ALBUTEROL SULFATE 2.5; .5 MG/3ML; MG/3ML
0.5-2.5 (3) SOLUTION RESPIRATORY (INHALATION)
Qty: 1 | Refills: 1 | Status: DISCONTINUED | COMMUNITY
Start: 2021-07-08 | End: 2021-07-23

## 2021-07-26 ENCOUNTER — APPOINTMENT (OUTPATIENT)
Dept: CARE COORDINATION | Facility: HOME HEALTH | Age: 76
End: 2021-07-26
Payer: MEDICARE

## 2021-07-26 VITALS
OXYGEN SATURATION: 98 % | DIASTOLIC BLOOD PRESSURE: 62 MMHG | RESPIRATION RATE: 16 BRPM | BODY MASS INDEX: 20.24 KG/M2 | WEIGHT: 110 LBS | SYSTOLIC BLOOD PRESSURE: 104 MMHG | HEART RATE: 68 BPM | HEIGHT: 62 IN

## 2021-07-26 PROCEDURE — 99024 POSTOP FOLLOW-UP VISIT: CPT

## 2021-07-26 NOTE — ASSESSMENT
[FreeTextEntry1] : Pt recovering well at home s/p OHS. Reviewed all medications and dosages with pt understanding. Pt has all medications in home and is taking as prescribed. Pain controlled with current medication regimen. No new symptoms, issues or concerns to report at this time.\par \par Pt with pulse oximeter in home, encouraged to titrate down 02 via NC as tolerated to maintain 02 sat > 92%. Pt placed on 4 L/min and 02 sat maintained 96-98%. Reviewed good pulmonary toileting and titration of 02. Pt also encouraged to increase mobility and ambulation during the day and use IS often. \par Pt with decreased appetite but supplementing with Boost shakes BID.

## 2021-07-26 NOTE — REASON FOR VISIT
[Follow-Up: _____] : a [unfilled] follow-up visit [FreeTextEntry1] : FOLLOW YOUR HEART- Transitional Care Management Program- [facility]\par \par

## 2021-07-26 NOTE — REVIEW OF SYSTEMS
[Fever] : no fever [Chills] : no chills [Feeling Poorly] : not feeling poorly [Feeling Tired] : feeling tired [Shortness Of Breath] : no shortness of breath [Wheezing] : no wheezing [Cough] : cough [SOB on Exertion] : shortness of breath during exertion [Orthopnea] : no orthopnea [PND] : no PND [Negative] : Psychiatric [FreeTextEntry6] : cough is occasional and produces clear/white sputum at this time [FreeTextEntry7] : last BM today

## 2021-07-26 NOTE — HISTORY OF PRESENT ILLNESS
[FreeTextEntry1] : 76 year old female current everyday 3ppd smoker on nicotine patch Hx COPD, PVD, \par CAD presents to [facility] for LHC, as preop testing prior to PAD procedure. S/p LHC \par revealing TVD, CT surgery consulted for CABG eval. Pt underwent a  C2L w/ L radial harvest (EF 45%) on 7/15 with Dr. Cole. Post op course included Acute blood loss anemia, SB, both now resolved. No BB 2/2 \par severe COPD with hypoxemia. Pulm followed pt during stay and prednisone taper initiated. Pt discharged home on 4-5 L/min 02 via NC and neb treatments. Pt remained hemodynamically stable and discharged home with support of spouse/family, home care services and the [facility] team. Initial visit completed in home\par CC "I think I'm doing ok, I'm tired but coming along"\par

## 2021-07-26 NOTE — PHYSICAL EXAM
[Sclera] : the sclera and conjunctiva were normal [Neck Appearance] : the appearance of the neck was normal [Respiration, Rhythm And Depth] : normal respiratory rhythm and effort [Exaggerated Use Of Accessory Muscles For Inspiration] : no accessory muscle use [Apical Impulse] : the apical impulse was normal [Heart Rate And Rhythm] : heart rate was normal and rhythm regular [Heart Sounds] : normal S1 and S2 [Murmurs] : no murmurs [Chest Visual Inspection Thoracic Asymmetry] : no chest asymmetry [1+] : left 1+ [FreeTextEntry2] : B/L LE without edema, B/L calves soft, NT [Bowel Sounds] : normal bowel sounds [Abdomen Soft] : soft [Abdomen Tenderness] : non-tender [Abnormal Walk] : normal gait [Musculoskeletal - Swelling] : no joint swelling seen [Skin Color & Pigmentation] : normal skin color and pigmentation [Skin Turgor] : normal skin turgor [] : no rash [FreeTextEntry1] : Left radial artery harvest site without erythema, warmth or drainage, Prinradha mccartyg CD&I, edges remain well approximated. Hand warm and pink  [Oriented To Time, Place, And Person] : oriented to person, place, and time [Impaired Insight] : insight and judgment were intact [Affect] : the affect was normal

## 2021-07-28 PROBLEM — Z09 POSTOPERATIVE FOLLOW-UP: Status: ACTIVE | Noted: 2021-07-28

## 2021-07-28 PROBLEM — Z95.1 S/P CORONARY ARTERY BYPASS GRAFT X 2: Status: ACTIVE | Noted: 2021-07-23

## 2021-07-28 RX ORDER — FAMOTIDINE 20 MG/1
20 TABLET, FILM COATED ORAL
Refills: 0 | Status: ACTIVE | COMMUNITY

## 2021-07-28 RX ORDER — TAMSULOSIN HYDROCHLORIDE 0.4 MG/1
0.4 CAPSULE ORAL
Qty: 90 | Refills: 0 | Status: ACTIVE | COMMUNITY

## 2021-07-28 RX ORDER — PREDNISONE 20 MG/1
20 TABLET ORAL
Refills: 0 | Status: ACTIVE | COMMUNITY

## 2021-07-28 RX ORDER — GABAPENTIN 300 MG/1
300 CAPSULE ORAL 3 TIMES DAILY
Qty: 90 | Refills: 0 | Status: ACTIVE | COMMUNITY

## 2021-07-28 RX ORDER — SENNOSIDES 8.6 MG TABLETS 8.6 MG/1
TABLET ORAL
Refills: 0 | Status: ACTIVE | COMMUNITY

## 2021-07-28 RX ORDER — ASPIRIN ENTERIC COATED TABLETS 81 MG 81 MG/1
81 TABLET, DELAYED RELEASE ORAL
Qty: 30 | Refills: 0 | Status: ACTIVE | COMMUNITY

## 2021-07-28 RX ORDER — NICOTINE 21 MG/24HR
21 PATCH, TRANSDERMAL 24 HOURS TRANSDERMAL DAILY
Qty: 1 | Refills: 0 | Status: ACTIVE | COMMUNITY

## 2021-07-28 RX ORDER — DILTIAZEM HYDROCHLORIDE 30 MG/1
30 TABLET, COATED ORAL EVERY 8 HOURS
Refills: 0 | Status: ACTIVE | COMMUNITY

## 2021-07-28 RX ORDER — ATORVASTATIN CALCIUM 40 MG/1
40 TABLET, FILM COATED ORAL
Qty: 30 | Refills: 0 | Status: ACTIVE | COMMUNITY

## 2021-07-29 ENCOUNTER — APPOINTMENT (OUTPATIENT)
Dept: CARDIOTHORACIC SURGERY | Facility: CLINIC | Age: 76
End: 2021-07-29
Payer: MEDICARE

## 2021-07-29 VITALS
RESPIRATION RATE: 16 BRPM | WEIGHT: 114 LBS | TEMPERATURE: 97.3 F | DIASTOLIC BLOOD PRESSURE: 97 MMHG | OXYGEN SATURATION: 9 % | BODY MASS INDEX: 20.85 KG/M2 | SYSTOLIC BLOOD PRESSURE: 165 MMHG | HEART RATE: 73 BPM

## 2021-07-29 DIAGNOSIS — Z09 ENCOUNTER FOR FOLLOW-UP EXAMINATION AFTER COMPLETED TREATMENT FOR CONDITIONS OTHER THAN MALIGNANT NEOPLASM: ICD-10-CM

## 2021-07-29 DIAGNOSIS — Z95.1 PRESENCE OF AORTOCORONARY BYPASS GRAFT: ICD-10-CM

## 2021-07-29 DIAGNOSIS — Z29.8 ENCOUNTER FOR OTHER SPECIFIED PROPHYLACTIC MEASURES: ICD-10-CM

## 2021-07-29 PROCEDURE — 99024 POSTOP FOLLOW-UP VISIT: CPT

## 2021-07-29 RX ORDER — CEPHALEXIN 250 MG/1
250 TABLET ORAL DAILY
Qty: 30 | Refills: 0 | Status: ACTIVE | COMMUNITY
Start: 2021-07-29 | End: 1900-01-01

## 2021-08-02 RX ORDER — OXYCODONE AND ACETAMINOPHEN 5; 325 MG/1; MG/1
5-325 TABLET ORAL TWICE DAILY
Qty: 30 | Refills: 0 | Status: ACTIVE | COMMUNITY
Start: 1900-01-01 | End: 1900-01-01

## 2021-08-09 ENCOUNTER — TRANSCRIPTION ENCOUNTER (OUTPATIENT)
Age: 76
End: 2021-08-09

## 2021-08-11 ENCOUNTER — APPOINTMENT (OUTPATIENT)
Dept: PULMONOLOGY | Facility: CLINIC | Age: 76
End: 2021-08-11
Payer: MEDICARE

## 2021-08-11 VITALS
SYSTOLIC BLOOD PRESSURE: 108 MMHG | DIASTOLIC BLOOD PRESSURE: 60 MMHG | TEMPERATURE: 97.6 F | HEART RATE: 75 BPM | WEIGHT: 116 LBS | OXYGEN SATURATION: 99 % | BODY MASS INDEX: 21.35 KG/M2 | RESPIRATION RATE: 16 BRPM | HEIGHT: 62 IN

## 2021-08-11 DIAGNOSIS — J43.9 EMPHYSEMA, UNSPECIFIED: ICD-10-CM

## 2021-08-11 DIAGNOSIS — F17.200 NICOTINE DEPENDENCE, UNSPECIFIED, UNCOMPLICATED: ICD-10-CM

## 2021-08-11 PROCEDURE — 99215 OFFICE O/P EST HI 40 MIN: CPT | Mod: 25

## 2021-08-11 PROCEDURE — 94618 PULMONARY STRESS TESTING: CPT

## 2021-08-11 PROCEDURE — 99406 BEHAV CHNG SMOKING 3-10 MIN: CPT | Mod: 25

## 2021-08-11 RX ORDER — ALBUTEROL 90 MCG
90 AEROSOL (GRAM) INHALATION EVERY 6 HOURS
Refills: 0 | Status: DISCONTINUED | COMMUNITY
End: 2021-08-11

## 2021-08-11 RX ORDER — ALBUTEROL SULFATE 90 UG/1
108 (90 BASE) AEROSOL, METERED RESPIRATORY (INHALATION)
Qty: 1 | Refills: 1 | Status: ACTIVE | COMMUNITY
Start: 2021-08-11 | End: 1900-01-01

## 2021-08-11 NOTE — PROCEDURE
[FreeTextEntry1] : PFT personally reviewed 7/8/21 mild obstructive ventilatory defect with mild gas exchange abnormality\par \par 6 minute walk testing 8/11/21 no desaturation

## 2021-08-11 NOTE — HISTORY OF PRESENT ILLNESS
[Current] : current [>= 30 pack years] : >= 30 pack years [Never] : never [TextBox_4] : Patient is a 76 year old female former heavy  smoker Hx COPD, PVD, CAD s/p CABG x 2 LIMA to LAD, LT radial to LPL, presents for follow up.  Patient has stopped smoking.  She is doing very well per patient.  She uses Trelegy daily as well as nebulized medications.  She would like to get off oxygen.  She is here for follow up  [TextBox_11] : 3 [TextBox_13] : 55

## 2021-08-11 NOTE — REASON FOR VISIT
Assessment/Plan:    No problem-specific Assessment & Plan notes found for this encounter  Problem List Items Addressed This Visit     None      Visit Diagnoses     External thrombosed hemorrhoids    -  Primary    Relevant Orders    Ambulatory referral to Colorectal Surgery        Appointment with colorectal today  Subjective:      Patient ID: Tracy Diaz is a 60 y o  female  HPI 59 y/o F here for lump in rectal area that came out with BM about 3 days ago  Yesterday she felt like the lump got hard and it is painful  She has no abdomen pain  No hx of hemorrhoids  No f/c  She has had intermittent blood in the past with wiping  NO home treatment  The following portions of the patient's history were reviewed and updated as appropriate: She  has a past medical history of Asthma and Gastritis  She   Patient Active Problem List    Diagnosis Date Noted    External hemorrhoid, thrombosed 03/12/2019    History of asthma 05/03/2018    Seasonal allergic rhinitis 05/03/2018    Hypertriglyceridemia 05/03/2018    Gastroesophageal reflux disease without esophagitis 09/19/2017     She  has no past surgical history on file  Her family history is not on file  She  reports that she has never smoked  She has never used smokeless tobacco  She reports that she does not drink alcohol or use drugs  Current Outpatient Medications   Medication Sig Dispense Refill    fenofibrate (TRIGLIDE) 160 MG tablet Take 1 tablet (160 mg total) by mouth daily 90 tablet 1    montelukast (SINGULAIR) 10 mg tablet TAKE 1 TABLET(10 MG) BY MOUTH DAILY AT BEDTIME 90 tablet 0    omeprazole (PriLOSEC) 40 MG capsule Take 1 capsule (40 mg total) by mouth daily 90 capsule 1     No current facility-administered medications for this visit        Current Outpatient Medications on File Prior to Visit   Medication Sig    fenofibrate (TRIGLIDE) 160 MG tablet Take 1 tablet (160 mg total) by mouth daily    montelukast (SINGULAIR) 10 mg tablet TAKE 1 TABLET(10 MG) BY MOUTH DAILY AT BEDTIME    omeprazole (PriLOSEC) 40 MG capsule Take 1 capsule (40 mg total) by mouth daily     No current facility-administered medications on file prior to visit  She has No Known Allergies       Review of Systems   Constitutional: Negative for chills and fever  Gastrointestinal: Positive for blood in stool and rectal pain  Negative for abdominal pain, constipation, diarrhea, nausea and vomiting  Genitourinary: Negative for difficulty urinating  Skin: Negative for rash and wound  Objective:      /74 (BP Location: Left arm, Patient Position: Sitting, Cuff Size: Standard)   Pulse 77   Temp 97 8 °F (36 6 °C) (Tympanic)   Resp 18   Ht 5' 3" (1 6 m)   Wt 69 9 kg (154 lb 3 oz)   SpO2 98%   BMI 27 31 kg/m²          Physical Exam   Constitutional: She is oriented to person, place, and time  She appears well-developed and well-nourished  HENT:   Head: Normocephalic and atraumatic  Right Ear: External ear normal    Left Ear: External ear normal    Nose: Nose normal    Mouth/Throat: Oropharynx is clear and moist    Eyes: Conjunctivae are normal    Neck: Normal range of motion  Neck supple  No thyromegaly present  Cardiovascular: Normal rate, regular rhythm and normal heart sounds  No murmur heard  Pulmonary/Chest: Effort normal and breath sounds normal  No respiratory distress  Abdominal: Soft  Bowel sounds are normal  She exhibits no mass  There is no tenderness  There is no guarding  2 cm pink/grey external hemorrhoid     Musculoskeletal: Normal range of motion  Lymphadenopathy:     She has no cervical adenopathy  Neurological: She is alert and oriented to person, place, and time  Skin: Skin is warm  Psychiatric: She has a normal mood and affect  Her behavior is normal    Nursing note and vitals reviewed  [Initial] : an initial visit [Cough] : cough [COPD] : COPD [Pre-op Risk Stratification] : pre-op risk stratification [Nicotine Dependence] : nicotine dependence [Family Member] : family member

## 2021-08-11 NOTE — COUNSELING
[Risk of tobacco use and health benefits of smoking cessation discussed] : Risk of tobacco use and health benefits of smoking cessation discussed [Use of nicotine replacement therapies and other medications discussed] : Use of nicotine replacement therapies and other medications discussed [Willing to Quit Smoking] : Willing to quit smoking [None] : None [Good understanding] : Patient has a good understanding of lifestyle changes and steps needed to achieve self management goal [Tobacco Use Cessation Intermediate Greater Than 3 Minutes Up to 10 Minutes] : Tobacco Use Cessation Intermediate Greater Than 3 Minutes Up to 10 Minutes [FreeTextEntry1] : 2

## 2021-08-11 NOTE — ASSESSMENT
[FreeTextEntry1] : 76 year old female former heavy smoker, COPD emphysema presents for follow up s/p CABG x 2 \par \par Continue Trelegy Ellipta 100-25 mcg daily\par Discontinue O2\par Albuterol/Ipratropium via nebulizer prior to Trelegy and every 6 hours as needed\par LDCT September 2021\par \par Follow up 4 months  \par \par

## 2021-08-17 RX ORDER — ALBUTEROL SULFATE 2.5 MG/3ML
(2.5 MG/3ML) SOLUTION RESPIRATORY (INHALATION)
Qty: 1 | Refills: 1 | Status: ACTIVE | COMMUNITY
Start: 2021-08-11 | End: 1900-01-01

## 2021-08-17 RX ORDER — FLUTICASONE FUROATE, UMECLIDINIUM BROMIDE AND VILANTEROL TRIFENATATE 100; 62.5; 25 UG/1; UG/1; UG/1
100-62.5-25 POWDER RESPIRATORY (INHALATION)
Qty: 1 | Refills: 3 | Status: ACTIVE | COMMUNITY
Start: 1900-01-01 | End: 1900-01-01

## 2021-08-17 RX ORDER — NICOTINE 21 MG/24HR
21 PATCH, TRANSDERMAL 24 HOURS TRANSDERMAL DAILY
Qty: 30 | Refills: 2 | Status: ACTIVE | COMMUNITY
Start: 2021-08-17 | End: 1900-01-01

## 2021-08-24 ENCOUNTER — NON-APPOINTMENT (OUTPATIENT)
Age: 76
End: 2021-08-24

## 2021-08-24 ENCOUNTER — TRANSCRIPTION ENCOUNTER (OUTPATIENT)
Age: 76
End: 2021-08-24

## 2021-08-24 RX ORDER — NICOTINE 21 MG/24HR
14 PATCH, TRANSDERMAL 24 HOURS TRANSDERMAL DAILY
Qty: 30 | Refills: 0 | Status: ACTIVE | COMMUNITY
Start: 2021-08-24 | End: 1900-01-01

## 2021-10-04 DIAGNOSIS — N39.0 URINARY TRACT INFECTION, SITE NOT SPECIFIED: ICD-10-CM

## 2021-10-04 LAB
APPEARANCE: ABNORMAL
BACTERIA: ABNORMAL
BILIRUBIN URINE: NEGATIVE
BLOOD URINE: ABNORMAL
COLOR: YELLOW
GLUCOSE QUALITATIVE U: NEGATIVE
HYALINE CASTS: 1 /LPF
KETONES URINE: NEGATIVE
LEUKOCYTE ESTERASE URINE: ABNORMAL
MICROSCOPIC-UA: NORMAL
NITRITE URINE: NEGATIVE
PH URINE: 6.5
PROTEIN URINE: ABNORMAL
RED BLOOD CELLS URINE: 13 /HPF
SPECIFIC GRAVITY URINE: 1.02
SQUAMOUS EPITHELIAL CELLS: 21 /HPF
UROBILINOGEN URINE: NEGATIVE
WHITE BLOOD CELLS URINE: 82 /HPF

## 2021-11-08 ENCOUNTER — APPOINTMENT (OUTPATIENT)
Age: 76
End: 2021-11-08

## 2022-08-10 ENCOUNTER — OUTPATIENT (OUTPATIENT)
Dept: OUTPATIENT SERVICES | Facility: HOSPITAL | Age: 77
LOS: 1 days | End: 2022-08-10
Payer: MEDICARE

## 2022-08-10 VITALS
TEMPERATURE: 97 F | DIASTOLIC BLOOD PRESSURE: 88 MMHG | WEIGHT: 143.08 LBS | HEART RATE: 82 BPM | RESPIRATION RATE: 14 BRPM | HEIGHT: 62 IN | OXYGEN SATURATION: 97 % | SYSTOLIC BLOOD PRESSURE: 136 MMHG

## 2022-08-10 DIAGNOSIS — I70.229 ATHEROSCLEROSIS OF NATIVE ARTERIES OF EXTREMITIES WITH REST PAIN, UNSPECIFIED EXTREMITY: ICD-10-CM

## 2022-08-10 DIAGNOSIS — Z01.818 ENCOUNTER FOR OTHER PREPROCEDURAL EXAMINATION: ICD-10-CM

## 2022-08-10 DIAGNOSIS — R09.89 OTHER SPECIFIED SYMPTOMS AND SIGNS INVOLVING THE CIRCULATORY AND RESPIRATORY SYSTEMS: ICD-10-CM

## 2022-08-10 DIAGNOSIS — I70.223 ATHEROSCLEROSIS OF NATIVE ARTERIES OF EXTREMITIES WITH REST PAIN, BILATERAL LEGS: ICD-10-CM

## 2022-08-10 DIAGNOSIS — Z95.1 PRESENCE OF AORTOCORONARY BYPASS GRAFT: Chronic | ICD-10-CM

## 2022-08-10 DIAGNOSIS — I25.10 ATHEROSCLEROTIC HEART DISEASE OF NATIVE CORONARY ARTERY WITHOUT ANGINA PECTORIS: ICD-10-CM

## 2022-08-10 DIAGNOSIS — Z29.9 ENCOUNTER FOR PROPHYLACTIC MEASURES, UNSPECIFIED: ICD-10-CM

## 2022-08-10 LAB
ANION GAP SERPL CALC-SCNC: 15 MMOL/L — SIGNIFICANT CHANGE UP (ref 5–17)
BLD GP AB SCN SERPL QL: NEGATIVE — SIGNIFICANT CHANGE UP
BUN SERPL-MCNC: 20 MG/DL — SIGNIFICANT CHANGE UP (ref 7–23)
CALCIUM SERPL-MCNC: 9.5 MG/DL — SIGNIFICANT CHANGE UP (ref 8.4–10.5)
CHLORIDE SERPL-SCNC: 106 MMOL/L — SIGNIFICANT CHANGE UP (ref 96–108)
CO2 SERPL-SCNC: 21 MMOL/L — LOW (ref 22–31)
CREAT SERPL-MCNC: 1.44 MG/DL — HIGH (ref 0.5–1.3)
EGFR: 38 ML/MIN/1.73M2 — LOW
GLUCOSE SERPL-MCNC: 111 MG/DL — HIGH (ref 70–99)
HCT VFR BLD CALC: 42.1 % — SIGNIFICANT CHANGE UP (ref 34.5–45)
HGB BLD-MCNC: 13.5 G/DL — SIGNIFICANT CHANGE UP (ref 11.5–15.5)
MCHC RBC-ENTMCNC: 30 PG — SIGNIFICANT CHANGE UP (ref 27–34)
MCHC RBC-ENTMCNC: 32.1 GM/DL — SIGNIFICANT CHANGE UP (ref 32–36)
MCV RBC AUTO: 93.6 FL — SIGNIFICANT CHANGE UP (ref 80–100)
NRBC # BLD: 0 /100 WBCS — SIGNIFICANT CHANGE UP (ref 0–0)
PLATELET # BLD AUTO: 226 K/UL — SIGNIFICANT CHANGE UP (ref 150–400)
POTASSIUM SERPL-MCNC: 4.1 MMOL/L — SIGNIFICANT CHANGE UP (ref 3.5–5.3)
POTASSIUM SERPL-SCNC: 4.1 MMOL/L — SIGNIFICANT CHANGE UP (ref 3.5–5.3)
RBC # BLD: 4.5 M/UL — SIGNIFICANT CHANGE UP (ref 3.8–5.2)
RBC # FLD: 14.2 % — SIGNIFICANT CHANGE UP (ref 10.3–14.5)
RH IG SCN BLD-IMP: POSITIVE — SIGNIFICANT CHANGE UP
SODIUM SERPL-SCNC: 142 MMOL/L — SIGNIFICANT CHANGE UP (ref 135–145)
WBC # BLD: 11.38 K/UL — HIGH (ref 3.8–10.5)
WBC # FLD AUTO: 11.38 K/UL — HIGH (ref 3.8–10.5)

## 2022-08-10 PROCEDURE — G0463: CPT

## 2022-08-10 PROCEDURE — 86901 BLOOD TYPING SEROLOGIC RH(D): CPT

## 2022-08-10 PROCEDURE — 85027 COMPLETE CBC AUTOMATED: CPT

## 2022-08-10 PROCEDURE — 86850 RBC ANTIBODY SCREEN: CPT

## 2022-08-10 PROCEDURE — 86900 BLOOD TYPING SEROLOGIC ABO: CPT

## 2022-08-10 PROCEDURE — 80048 BASIC METABOLIC PNL TOTAL CA: CPT

## 2022-08-10 RX ORDER — CEPHALEXIN 500 MG
1 CAPSULE ORAL
Qty: 0 | Refills: 0 | DISCHARGE

## 2022-08-10 RX ORDER — ALBUTEROL 90 UG/1
2 AEROSOL, METERED ORAL
Qty: 0 | Refills: 0 | DISCHARGE

## 2022-08-10 NOTE — H&P PST ADULT - PRIMARY CARE PROVIDER
Chris Kirkpatrick, PCP, [phone] (seen7/2022); Jez Guzman, Cardio, [phone] (seen 8/9/2022 having carotid dopplers 8/11)

## 2022-08-10 NOTE — H&P PST ADULT - HISTORY OF PRESENT ILLNESS
Ms. Kay is a 77 year old woman with PMH former smoker (quit 6/2021), CAD s/p CABGx2 (7/2021), HLD, HTN, GERD, COPD/Emphysema and PAD of both lower extremities with bilateral rest pain now scheduled for left femoral endarterectomy, possible bilateral, left iliac stent, possible right iliac stent, aortogram, possible femoral femoral bypass on 8/17/2022.    Denies s/s of COVID, no recent international travel or travel outside [facility] in last 2 weeks

## 2022-08-10 NOTE — H&P PST ADULT - ASSESSMENT
CAPRINI SCORE [CLOT]    AGE RELATED RISK FACTORS                                                       MOBILITY RELATED FACTORS  [ ] Age 41-60 years                                            (1 Point)                  [ ] Bed rest                                                        (1 Point)  [ ] Age: 61-74 years                                           (2 Points)                 [ ] Plaster cast                                                   (2 Points)  [x ] Age= 75 years                                              (3 Points)                 [ ] Bed bound for more than 72 hours                 (2 Points)    DISEASE RELATED RISK FACTORS                                               GENDER SPECIFIC FACTORS  [ ] Edema in the lower extremities                       (1 Point)                  [ ] Pregnancy                                                     (1 Point)  [ ] Varicose veins                                               (1 Point)                  [ ] Post-partum < 6 weeks                                   (1 Point)             [x ] BMI > 25 Kg/m2                                            (1 Point)                  [ ] Hormonal therapy  or oral contraception          (1 Point)                 [ ] Sepsis (in the previous month)                        (1 Point)                  [ ] History of pregnancy complications                 (1 point)  [ ] Pneumonia or serious lung disease                                               [ ] Unexplained or recurrent                     (1 Point)           (in the previous month)                               (1 Point)  [ ] Abnormal pulmonary function test                     (1 Point)                 SURGERY RELATED RISK FACTORS  [ ] Acute myocardial infarction                              (1 Point)                 [ ]  Section                                             (1 Point)  [ ] Congestive heart failure (in the previous month)  (1 Point)               [ ] Minor surgery                                                  (1 Point)   [ ] Inflammatory bowel disease                             (1 Point)                 [ ] Arthroscopic surgery                                        (2 Points)  [ ] Central venous access                                      (2 Points)                [x ] General surgery lasting more than 45 minutes   (2 Points)       [ ] Stroke (in the previous month)                          (5 Points)               [ ] Elective arthroplasty                                         (5 Points)                                                                                                                                               HEMATOLOGY RELATED FACTORS                                                 TRAUMA RELATED RISK FACTORS  [ ] Prior episodes of VTE                                     (3 Points)                 [ ] Fracture of the hip, pelvis, or leg                       (5 Points)  [ ] Positive family history for VTE                         (3 Points)                 [ ] Acute spinal cord injury (in the previous month)  (5 Points)  [ ] Prothrombin 08600 A                                     (3 Points)                 [ ] Paralysis  (less than 1 month)                             (5 Points)  [ ] Factor V Leiden                                             (3 Points)                  [ ] Multiple Trauma within 1 month                        (5 Points)  [ ] Lupus anticoagulants                                     (3 Points)                                                           [ ] Anticardiolipin antibodies                               (3 Points)                                                       [ ] High homocysteine in the blood                      (3 Points)                                             [ ] Other congenital or acquired thrombophilia      (3 Points)                                                [ ] Heparin induced thrombocytopenia                  (3 Points)                                          Total Score [   6       ]

## 2022-08-10 NOTE — H&P PST ADULT - PROBLEM SELECTOR PLAN 1
Scheduled for left femoral endarterectomy, possible bilateral, left iliac stent, possible right iliac stetnt, aortogram and possible femoral femoral bypass on 8/17/2022  Preop instructions given  Labs pending  COVID PCR 8/14/2022  ABO upon admission  Awaiting carotid dopplers (8/11/2022) Scheduled for left femoral endarterectomy, possible bilateral, left iliac stent, possible right iliac stetnt, aortogram and possible femoral femoral bypass on 8/17/2022  Preop instructions given  Labs pending  COVID PCR 8/14/2022  ABO upon admission

## 2022-08-10 NOTE — H&P PST ADULT - NSICDXPASTMEDICALHX_GEN_ALL_CORE_FT
PAST MEDICAL HISTORY:  CAD (coronary artery disease)     COPD (chronic obstructive pulmonary disease)     GERD (gastroesophageal reflux disease)     HLD (hyperlipidemia)     HTN (hypertension)     PVD (peripheral vascular disease)     Right carotid bruit     Tobacco use Quit June 2021

## 2022-08-10 NOTE — H&P PST ADULT - MUSCULOSKELETAL
negative bilateral LE, Left is actually weaker proximally than the right/decreased ROM/decreased strength

## 2022-08-10 NOTE — H&P PST ADULT - RESPIRATORY
clear to auscultation bilaterally/no wheezes/no rhonchi/breath sounds equal/respirations non-labored

## 2022-08-14 ENCOUNTER — OUTPATIENT (OUTPATIENT)
Dept: OUTPATIENT SERVICES | Facility: HOSPITAL | Age: 77
LOS: 1 days | End: 2022-08-14
Payer: MEDICARE

## 2022-08-14 DIAGNOSIS — Z95.1 PRESENCE OF AORTOCORONARY BYPASS GRAFT: Chronic | ICD-10-CM

## 2022-08-14 PROCEDURE — C9803: CPT

## 2022-08-14 PROCEDURE — U0003: CPT

## 2022-08-14 PROCEDURE — U0005: CPT

## 2022-08-20 PROBLEM — Z72.0 TOBACCO USE: Chronic | Status: ACTIVE | Noted: 2021-07-09

## 2022-08-20 PROBLEM — K21.9 GASTRO-ESOPHAGEAL REFLUX DISEASE WITHOUT ESOPHAGITIS: Chronic | Status: ACTIVE | Noted: 2022-08-10

## 2022-08-20 PROBLEM — R09.89 OTHER SPECIFIED SYMPTOMS AND SIGNS INVOLVING THE CIRCULATORY AND RESPIRATORY SYSTEMS: Chronic | Status: ACTIVE | Noted: 2022-08-10

## 2022-08-20 PROBLEM — E78.5 HYPERLIPIDEMIA, UNSPECIFIED: Chronic | Status: ACTIVE | Noted: 2022-08-10

## 2022-08-20 PROBLEM — I25.10 ATHEROSCLEROTIC HEART DISEASE OF NATIVE CORONARY ARTERY WITHOUT ANGINA PECTORIS: Chronic | Status: ACTIVE | Noted: 2022-08-10

## 2022-08-24 DIAGNOSIS — Z11.52 ENCOUNTER FOR SCREENING FOR COVID-19: ICD-10-CM

## 2022-09-04 ENCOUNTER — OUTPATIENT (OUTPATIENT)
Dept: OUTPATIENT SERVICES | Facility: HOSPITAL | Age: 77
LOS: 1 days | End: 2022-09-04

## 2022-09-04 DIAGNOSIS — Z95.1 PRESENCE OF AORTOCORONARY BYPASS GRAFT: Chronic | ICD-10-CM

## 2022-09-04 DIAGNOSIS — Z11.52 ENCOUNTER FOR SCREENING FOR COVID-19: ICD-10-CM

## 2022-09-04 LAB — SARS-COV-2 RNA SPEC QL NAA+PROBE: SIGNIFICANT CHANGE UP

## 2022-09-06 ENCOUNTER — TRANSCRIPTION ENCOUNTER (OUTPATIENT)
Age: 77
End: 2022-09-06

## 2022-09-07 ENCOUNTER — TRANSCRIPTION ENCOUNTER (OUTPATIENT)
Age: 77
End: 2022-09-07

## 2022-09-07 ENCOUNTER — RESULT REVIEW (OUTPATIENT)
Age: 77
End: 2022-09-07

## 2022-09-07 ENCOUNTER — INPATIENT (INPATIENT)
Facility: HOSPITAL | Age: 77
LOS: 2 days | Discharge: HOME | DRG: 38 | End: 2022-09-10
Attending: SURGERY | Admitting: SURGERY
Payer: MEDICARE

## 2022-09-07 VITALS
HEIGHT: 62 IN | OXYGEN SATURATION: 96 % | WEIGHT: 143.08 LBS | RESPIRATION RATE: 18 BRPM | TEMPERATURE: 98 F | DIASTOLIC BLOOD PRESSURE: 75 MMHG | HEART RATE: 61 BPM | SYSTOLIC BLOOD PRESSURE: 152 MMHG

## 2022-09-07 DIAGNOSIS — I65.23 OCCLUSION AND STENOSIS OF BILATERAL CAROTID ARTERIES: ICD-10-CM

## 2022-09-07 DIAGNOSIS — Z95.1 PRESENCE OF AORTOCORONARY BYPASS GRAFT: Chronic | ICD-10-CM

## 2022-09-07 LAB
BLD GP AB SCN SERPL QL: NEGATIVE — SIGNIFICANT CHANGE UP
GAS PNL BLDA: SIGNIFICANT CHANGE UP
GAS PNL BLDA: SIGNIFICANT CHANGE UP
RH IG SCN BLD-IMP: POSITIVE — SIGNIFICANT CHANGE UP

## 2022-09-07 PROCEDURE — 88311 DECALCIFY TISSUE: CPT | Mod: 26

## 2022-09-07 PROCEDURE — 93010 ELECTROCARDIOGRAM REPORT: CPT

## 2022-09-07 PROCEDURE — 88304 TISSUE EXAM BY PATHOLOGIST: CPT | Mod: 26

## 2022-09-07 DEVICE — KIT A-LINE 1LUM 20G X 12CM SAFE KIT: Type: IMPLANTABLE DEVICE | Status: FUNCTIONAL

## 2022-09-07 DEVICE — SURGIFOAM PAD 8CM X 12.5CM X 10MM (100): Type: IMPLANTABLE DEVICE | Status: FUNCTIONAL

## 2022-09-07 DEVICE — GRAFT VASCULAR PATCH HEMASHIELD PLATINUM FINESSE UT 0.8 X 7.6CM: Type: IMPLANTABLE DEVICE | Status: FUNCTIONAL

## 2022-09-07 DEVICE — CLIP APPLIER COVIDIEN SURGICLIP 11.5" MEDIUM: Type: IMPLANTABLE DEVICE | Status: FUNCTIONAL

## 2022-09-07 DEVICE — CLIP APPLIER COVIDIEN SURGICLIP III 9" SM: Type: IMPLANTABLE DEVICE | Status: FUNCTIONAL

## 2022-09-07 DEVICE — SURGICEL POWDER 3 GRAMS: Type: IMPLANTABLE DEVICE | Status: FUNCTIONAL

## 2022-09-07 RX ORDER — SIMETHICONE 80 MG/1
80 TABLET, CHEWABLE ORAL ONCE
Refills: 0 | Status: COMPLETED | OUTPATIENT
Start: 2022-09-07 | End: 2022-09-07

## 2022-09-07 RX ORDER — ONDANSETRON 8 MG/1
4 TABLET, FILM COATED ORAL ONCE
Refills: 0 | Status: DISCONTINUED | OUTPATIENT
Start: 2022-09-07 | End: 2022-09-07

## 2022-09-07 RX ORDER — ACETAMINOPHEN 500 MG
650 TABLET ORAL EVERY 6 HOURS
Refills: 0 | Status: DISCONTINUED | OUTPATIENT
Start: 2022-09-07 | End: 2022-09-10

## 2022-09-07 RX ORDER — SODIUM CHLORIDE 9 MG/ML
1000 INJECTION, SOLUTION INTRAVENOUS
Refills: 0 | Status: DISCONTINUED | OUTPATIENT
Start: 2022-09-07 | End: 2022-09-08

## 2022-09-07 RX ORDER — ACETAMINOPHEN 500 MG
1000 TABLET ORAL ONCE
Refills: 0 | Status: COMPLETED | OUTPATIENT
Start: 2022-09-07 | End: 2022-09-07

## 2022-09-07 RX ORDER — TOBRAMYCIN 0.3 %
DROPS OPHTHALMIC (EYE)
Refills: 0 | Status: COMPLETED | OUTPATIENT
Start: 2022-09-07 | End: 2022-09-08

## 2022-09-07 RX ORDER — TOBRAMYCIN 0.3 %
1 DROPS OPHTHALMIC (EYE) ONCE
Refills: 0 | Status: COMPLETED | OUTPATIENT
Start: 2022-09-07 | End: 2022-09-07

## 2022-09-07 RX ORDER — HEPARIN SODIUM 5000 [USP'U]/ML
5000 INJECTION INTRAVENOUS; SUBCUTANEOUS EVERY 8 HOURS
Refills: 0 | Status: DISCONTINUED | OUTPATIENT
Start: 2022-09-07 | End: 2022-09-07

## 2022-09-07 RX ORDER — GABAPENTIN 400 MG/1
300 CAPSULE ORAL
Refills: 0 | Status: DISCONTINUED | OUTPATIENT
Start: 2022-09-07 | End: 2022-09-10

## 2022-09-07 RX ORDER — SENNA PLUS 8.6 MG/1
2 TABLET ORAL AT BEDTIME
Refills: 0 | Status: DISCONTINUED | OUTPATIENT
Start: 2022-09-07 | End: 2022-09-10

## 2022-09-07 RX ORDER — HYDROMORPHONE HYDROCHLORIDE 2 MG/ML
0.5 INJECTION INTRAMUSCULAR; INTRAVENOUS; SUBCUTANEOUS ONCE
Refills: 0 | Status: DISCONTINUED | OUTPATIENT
Start: 2022-09-07 | End: 2022-09-07

## 2022-09-07 RX ORDER — TIOTROPIUM BROMIDE 18 UG/1
1 CAPSULE ORAL; RESPIRATORY (INHALATION) DAILY
Refills: 0 | Status: DISCONTINUED | OUTPATIENT
Start: 2022-09-07 | End: 2022-09-10

## 2022-09-07 RX ORDER — HYDRALAZINE HCL 50 MG
5 TABLET ORAL ONCE
Refills: 0 | Status: COMPLETED | OUTPATIENT
Start: 2022-09-07 | End: 2022-09-07

## 2022-09-07 RX ORDER — FAMOTIDINE 10 MG/ML
20 INJECTION INTRAVENOUS DAILY
Refills: 0 | Status: DISCONTINUED | OUTPATIENT
Start: 2022-09-07 | End: 2022-09-10

## 2022-09-07 RX ORDER — ATORVASTATIN CALCIUM 80 MG/1
40 TABLET, FILM COATED ORAL AT BEDTIME
Refills: 0 | Status: DISCONTINUED | OUTPATIENT
Start: 2022-09-07 | End: 2022-09-10

## 2022-09-07 RX ORDER — FAMOTIDINE 10 MG/ML
20 INJECTION INTRAVENOUS
Refills: 0 | Status: DISCONTINUED | OUTPATIENT
Start: 2022-09-07 | End: 2022-09-07

## 2022-09-07 RX ORDER — HEPARIN SODIUM 5000 [USP'U]/ML
5000 INJECTION INTRAVENOUS; SUBCUTANEOUS EVERY 8 HOURS
Refills: 0 | Status: DISCONTINUED | OUTPATIENT
Start: 2022-09-07 | End: 2022-09-10

## 2022-09-07 RX ORDER — ASPIRIN/CALCIUM CARB/MAGNESIUM 324 MG
81 TABLET ORAL DAILY
Refills: 0 | Status: DISCONTINUED | OUTPATIENT
Start: 2022-09-07 | End: 2022-09-10

## 2022-09-07 RX ORDER — OXYCODONE HYDROCHLORIDE 5 MG/1
10 TABLET ORAL EVERY 4 HOURS
Refills: 0 | Status: DISCONTINUED | OUTPATIENT
Start: 2022-09-07 | End: 2022-09-07

## 2022-09-07 RX ORDER — BUDESONIDE AND FORMOTEROL FUMARATE DIHYDRATE 160; 4.5 UG/1; UG/1
2 AEROSOL RESPIRATORY (INHALATION)
Refills: 0 | Status: DISCONTINUED | OUTPATIENT
Start: 2022-09-07 | End: 2022-09-10

## 2022-09-07 RX ORDER — FAMOTIDINE 10 MG/ML
20 INJECTION INTRAVENOUS ONCE
Refills: 0 | Status: COMPLETED | OUTPATIENT
Start: 2022-09-07 | End: 2022-09-07

## 2022-09-07 RX ORDER — OXYCODONE HYDROCHLORIDE 5 MG/1
5 TABLET ORAL EVERY 4 HOURS
Refills: 0 | Status: DISCONTINUED | OUTPATIENT
Start: 2022-09-07 | End: 2022-09-10

## 2022-09-07 RX ORDER — TOBRAMYCIN 0.3 %
1 DROPS OPHTHALMIC (EYE) EVERY 4 HOURS
Refills: 0 | Status: COMPLETED | OUTPATIENT
Start: 2022-09-07 | End: 2022-09-08

## 2022-09-07 RX ORDER — NICARDIPINE HYDROCHLORIDE 30 MG/1
5 CAPSULE, EXTENDED RELEASE ORAL
Qty: 40 | Refills: 0 | Status: DISCONTINUED | OUTPATIENT
Start: 2022-09-07 | End: 2022-09-07

## 2022-09-07 RX ORDER — FENTANYL CITRATE 50 UG/ML
25 INJECTION INTRAVENOUS
Refills: 0 | Status: DISCONTINUED | OUTPATIENT
Start: 2022-09-07 | End: 2022-09-07

## 2022-09-07 RX ORDER — FENTANYL CITRATE 50 UG/ML
50 INJECTION INTRAVENOUS ONCE
Refills: 0 | Status: DISCONTINUED | OUTPATIENT
Start: 2022-09-07 | End: 2022-09-07

## 2022-09-07 RX ADMIN — FENTANYL CITRATE 50 MICROGRAM(S): 50 INJECTION INTRAVENOUS at 18:45

## 2022-09-07 RX ADMIN — Medication 1 DROP(S): at 18:30

## 2022-09-07 RX ADMIN — ATORVASTATIN CALCIUM 40 MILLIGRAM(S): 80 TABLET, FILM COATED ORAL at 22:12

## 2022-09-07 RX ADMIN — Medication 1 DROP(S): at 22:12

## 2022-09-07 RX ADMIN — Medication 81 MILLIGRAM(S): at 15:56

## 2022-09-07 RX ADMIN — Medication 1 DROP(S): at 14:32

## 2022-09-07 RX ADMIN — Medication 650 MILLIGRAM(S): at 19:33

## 2022-09-07 RX ADMIN — HEPARIN SODIUM 5000 UNIT(S): 5000 INJECTION INTRAVENOUS; SUBCUTANEOUS at 19:35

## 2022-09-07 RX ADMIN — Medication 400 MILLIGRAM(S): at 14:06

## 2022-09-07 RX ADMIN — SIMETHICONE 80 MILLIGRAM(S): 80 TABLET, CHEWABLE ORAL at 12:18

## 2022-09-07 RX ADMIN — Medication 1000 MILLIGRAM(S): at 14:21

## 2022-09-07 RX ADMIN — Medication 1 DROP(S): at 14:31

## 2022-09-07 RX ADMIN — BUDESONIDE AND FORMOTEROL FUMARATE DIHYDRATE 2 PUFF(S): 160; 4.5 AEROSOL RESPIRATORY (INHALATION) at 18:30

## 2022-09-07 RX ADMIN — SODIUM CHLORIDE 75 MILLILITER(S): 9 INJECTION, SOLUTION INTRAVENOUS at 13:35

## 2022-09-07 RX ADMIN — Medication 5 MILLIGRAM(S): at 12:42

## 2022-09-07 RX ADMIN — FENTANYL CITRATE 50 MICROGRAM(S): 50 INJECTION INTRAVENOUS at 18:30

## 2022-09-07 RX ADMIN — GABAPENTIN 300 MILLIGRAM(S): 400 CAPSULE ORAL at 18:30

## 2022-09-07 RX ADMIN — NICARDIPINE HYDROCHLORIDE 25 MG/HR: 30 CAPSULE, EXTENDED RELEASE ORAL at 13:17

## 2022-09-07 RX ADMIN — FAMOTIDINE 20 MILLIGRAM(S): 10 INJECTION INTRAVENOUS at 12:18

## 2022-09-07 NOTE — PRE-ANESTHESIA EVALUATION ADULT - NSANTHPMHFT_GEN_ALL_CORE
77F hx of HTN, HLD, GERD, COPD/emphysema, PAD of b/l lower extremities, CAD s/p CABG x 2 (7/2021). SHEIKH that has much improved since CABG and quitting smoking. Denies CP.    Discussed possibility of post op intubation with patient.

## 2022-09-07 NOTE — DISCHARGE NOTE NURSING/CASE MANAGEMENT/SOCIAL WORK - NSDCPEFALRISK_GEN_ALL_CORE
For information on Fall & Injury Prevention, visit: [email or web address] OR  [email or web address] OR  https://www.cdc.gov/steadi/patient.html

## 2022-09-07 NOTE — CHART NOTE - NSCHARTNOTEFT_GEN_A_CORE
Post op check for Vascular Sx    S: Pt seen and examined at bedside. Having some pain at surgical site.    T(C): 36.4 (09-07-22 @ 15:00), Max: 36.5 (09-07-22 @ 05:40)  HR: 90 (09-07-22 @ 17:45) (61 - 95)  BP: 146/75 (09-07-22 @ 17:45) (129/63 - 176/77)  RR: 14 (09-07-22 @ 17:45) (14 - 18)  SpO2: 93% (09-07-22 @ 17:45) (92% - 97%)  Wt(kg): --    09-07 @ 07:01  -  09-07 @ 18:29  --------------------------------------------------------  IN: 425 mL / OUT: 760 mL / NET: -335 mL    Nicardipine gtt: 2.5      Gen: Laying in bed, NAD  MSK/VASC: Moving all extremities spontaneously, bilateral radial, DP, and PT pulses palpable. CN III-XII intact, motor and sensation of bilateral upper and lower extremities intact. Incision c/d/i    A/P:  Ms. Kay is a 76 yo female with hx of CAD s/p CABGx2, COPD/emphysema, and PAD of bilateral LE presenting with b/l carotid stenoses now 4 hrs post-op s/p L CEA.    - pain control  - CLD, ADAT  - IVF  - wean nicardipine gtt, maintain -160  - OOB  - moore out at midnight    Vascular sx  [id number] Post op check for Vascular Sx    S: Pt seen and examined at bedside. Having some pain at surgical site.    T(C): 36.4 (09-07-22 @ 15:00), Max: 36.5 (09-07-22 @ 05:40)  HR: 90 (09-07-22 @ 17:45) (61 - 95)  BP: 146/75 (09-07-22 @ 17:45) (129/63 - 176/77)  RR: 14 (09-07-22 @ 17:45) (14 - 18)  SpO2: 93% (09-07-22 @ 17:45) (92% - 97%)  Wt(kg): --    09-07 @ 07:01  -  09-07 @ 18:29  --------------------------------------------------------  IN: 425 mL / OUT: 760 mL / NET: -335 mL    Nicardipine gtt: 2.5      Gen: Laying in bed, NAD  MSK/VASC: Moving all extremities spontaneously, bilateral radial, DP, and PT pulses palpable. L sided tongue deviation at baseline, otherwise CN III-XII intact, motor and sensation of bilateral upper and lower extremities intact. Incision c/d/i    A/P:  Ms. Kay is a 78 yo female with hx of CAD s/p CABGx2, COPD/emphysema, and PAD of bilateral LE presenting with b/l carotid stenoses now 4 hrs post-op s/p L CEA.    - pain control  - CLD, ADAT  - IVF  - wean nicardipine gtt, maintain -160  - OOB  - moore out at midnight    Vascular sx  [id number]

## 2022-09-07 NOTE — H&P PST ADULT - HISTORY OF PRESENT ILLNESS
Ms. Kay is a 77 year old woman with PMH former smoker (quit 6/2021), CAD s/p CABGx2 (7/2021), HLD, HTN, GERD, COPD/Emphysema and PAD of both lower extremities with bilateral rest pain, occlusion & stenosis of bilateral carotid arteries now scheduled for L CEA on 9/7/22.    Denies s/s of COVID, no recent international travel or travel outside [facility] in last 2 weeks

## 2022-09-07 NOTE — H&P PST ADULT - PRIMARY CARE PROVIDER
Chris Kirkpatrick, PCP, [phone] (seen7/2022); Jez Guzman, Cardio, [phone] (seen 8/9/2022 having carotid dopplers 8/11)

## 2022-09-07 NOTE — H&P PST ADULT - MUSCULOSKELETAL
bilateral LE, Left is actually weaker proximally than the right/decreased ROM/decreased strength negative

## 2022-09-07 NOTE — DISCHARGE NOTE NURSING/CASE MANAGEMENT/SOCIAL WORK - PATIENT PORTAL LINK FT
You can access the FollowMyHealth Patient Portal offered by [facility] by registering at the following website: [email or web address]. By joining [facility]’s FollowMyHealth portal, you will also be able to view your health information using other applications (apps) compatible with our system.

## 2022-09-07 NOTE — BRIEF OPERATIVE NOTE - OPERATION/FINDINGS
EEG stable throughout clamping motor intact. Patch reconstruction of carotid. Moving Left upper and lower extremity after extubation minimal tongue deviation.

## 2022-09-07 NOTE — PATIENT PROFILE ADULT - FALL HARM RISK - HARM RISK INTERVENTIONS

## 2022-09-07 NOTE — H&P PST ADULT - ATTENDING COMMENTS
She has severe PAD and was initially scheduled for surgery to treat that issue. She had a carotid bruit, but no neurologic symptoms. A duplex scan and CTA noted a severe left ICA stenosis. It was felt that the leg condition could wait a short time to be treated. Also that she would be at increased samy of a stroke if the carotid stenosis was not first treated. This was explained in detail to the patient. The procedure, risks and alternatives were explained and informed consent was obtained.

## 2022-09-07 NOTE — BRIEF OPERATIVE NOTE - NSICDXBRIEFPROCEDURE_GEN_ALL_CORE_FT
PROCEDURES:  CEA (carotid endarterectomy) 07-Sep-2022 12:07:50 Left Carotid Endarterectomy Julien Monique

## 2022-09-07 NOTE — H&P PST ADULT - PROBLEM/PLAN-2
DISPLAY PLAN FREE TEXT Coronary artery disease involving coronary bypass graft of native heart without angina pectoris

## 2022-09-07 NOTE — H&P PST ADULT - ASSESSMENT
CAPRINI SCORE [CLOT]    AGE RELATED RISK FACTORS                                                       MOBILITY RELATED FACTORS  [ ] Age 41-60 years                                            (1 Point)                  [ ] Bed rest                                                        (1 Point)  [ ] Age: 61-74 years                                           (2 Points)                 [ ] Plaster cast                                                   (2 Points)  [x ] Age= 75 years                                              (3 Points)                 [ ] Bed bound for more than 72 hours                 (2 Points)    DISEASE RELATED RISK FACTORS                                               GENDER SPECIFIC FACTORS  [ ] Edema in the lower extremities                       (1 Point)                  [ ] Pregnancy                                                     (1 Point)  [ ] Varicose veins                                               (1 Point)                  [ ] Post-partum < 6 weeks                                   (1 Point)             [x ] BMI > 25 Kg/m2                                            (1 Point)                  [ ] Hormonal therapy  or oral contraception          (1 Point)                 [ ] Sepsis (in the previous month)                        (1 Point)                  [ ] History of pregnancy complications                 (1 point)  [ ] Pneumonia or serious lung disease                                               [ ] Unexplained or recurrent                     (1 Point)           (in the previous month)                               (1 Point)  [ ] Abnormal pulmonary function test                     (1 Point)                 SURGERY RELATED RISK FACTORS  [ ] Acute myocardial infarction                              (1 Point)                 [ ]  Section                                             (1 Point)  [ ] Congestive heart failure (in the previous month)  (1 Point)               [ ] Minor surgery                                                  (1 Point)   [ ] Inflammatory bowel disease                             (1 Point)                 [ ] Arthroscopic surgery                                        (2 Points)  [ ] Central venous access                                      (2 Points)                [x ] General surgery lasting more than 45 minutes   (2 Points)       [ ] Stroke (in the previous month)                          (5 Points)               [ ] Elective arthroplasty                                         (5 Points)                                                                                                                                               HEMATOLOGY RELATED FACTORS                                                 TRAUMA RELATED RISK FACTORS  [ ] Prior episodes of VTE                                     (3 Points)                 [ ] Fracture of the hip, pelvis, or leg                       (5 Points)  [ ] Positive family history for VTE                         (3 Points)                 [ ] Acute spinal cord injury (in the previous month)  (5 Points)  [ ] Prothrombin 22661 A                                     (3 Points)                 [ ] Paralysis  (less than 1 month)                             (5 Points)  [ ] Factor V Leiden                                             (3 Points)                  [ ] Multiple Trauma within 1 month                        (5 Points)  [ ] Lupus anticoagulants                                     (3 Points)                                                           [ ] Anticardiolipin antibodies                               (3 Points)                                                       [ ] High homocysteine in the blood                      (3 Points)                                             [ ] Other congenital or acquired thrombophilia      (3 Points)                                                [ ] Heparin induced thrombocytopenia                  (3 Points)                                          Total Score [   6       ]

## 2022-09-07 NOTE — H&P PST ADULT - REASON FOR ADMISSION
Pain in both legs affecting her ability to walk long distances, occlusion & stenosis of bilateral carotid arteries

## 2022-09-07 NOTE — PATIENT PROFILE ADULT - FUNCTIONAL ASSESSMENT - DAILY ACTIVITY 5.
Per Dr Schmitt patient to recheck BMP level on Wednesday. Decrease the metolazone from 2.5 mg qd to 1.25 mg qd due to stabilization of weight of 264-266 pounds. Edema to lower extremities is gone per patient. Med sheet updated.Future lab orders placed in electronic medical record.      Discussed lab results and above with patient, verbalizes understanding.     Per Dr Schmitt obtain echocardi[facility] disc [facility] on 7/2/18, for MD's review.   4 = No assist / stand by assistance

## 2022-09-07 NOTE — CHART NOTE - NSCHARTNOTEFT_GEN_A_CORE
Called to bedside to evaluate patient for corneal abrasion. Pt c/o something in her eye. patient with immediate relief from tetracaine. Will have Tobramycin 0.3% every 4 hours for 6 total doses. Paged Vascular to inform them

## 2022-09-07 NOTE — H&P PST ADULT - PROBLEM SELECTOR PLAN 1
Scheduled for left femoral endarterectomy, possible bilateral, left iliac stent, possible right iliac stetnt, aortogram and possible femoral femoral bypass on 8/17/2022  Preop instructions given  Labs pending  COVID PCR 8/14/2022  ABO upon admission

## 2022-09-08 ENCOUNTER — TRANSCRIPTION ENCOUNTER (OUTPATIENT)
Age: 77
End: 2022-09-08

## 2022-09-08 LAB
A1C WITH ESTIMATED AVERAGE GLUCOSE RESULT: 6 % — HIGH (ref 4–5.6)
ALBUMIN SERPL ELPH-MCNC: 3.3 G/DL — SIGNIFICANT CHANGE UP (ref 3.3–5)
ALP SERPL-CCNC: 150 U/L — HIGH (ref 40–120)
ALT FLD-CCNC: 8 U/L — LOW (ref 10–45)
ANION GAP SERPL CALC-SCNC: 13 MMOL/L — SIGNIFICANT CHANGE UP (ref 5–17)
AST SERPL-CCNC: 14 U/L — SIGNIFICANT CHANGE UP (ref 10–40)
BASOPHILS # BLD AUTO: 0.02 K/UL — SIGNIFICANT CHANGE UP (ref 0–0.2)
BASOPHILS NFR BLD AUTO: 0.2 % — SIGNIFICANT CHANGE UP (ref 0–2)
BILIRUB SERPL-MCNC: 0.3 MG/DL — SIGNIFICANT CHANGE UP (ref 0.2–1.2)
BUN SERPL-MCNC: 12 MG/DL — SIGNIFICANT CHANGE UP (ref 7–23)
CALCIUM SERPL-MCNC: 9 MG/DL — SIGNIFICANT CHANGE UP (ref 8.4–10.5)
CHLORIDE SERPL-SCNC: 107 MMOL/L — SIGNIFICANT CHANGE UP (ref 96–108)
CO2 SERPL-SCNC: 20 MMOL/L — LOW (ref 22–31)
CREAT SERPL-MCNC: 0.92 MG/DL — SIGNIFICANT CHANGE UP (ref 0.5–1.3)
EGFR: 65 ML/MIN/1.73M2 — SIGNIFICANT CHANGE UP
EOSINOPHIL # BLD AUTO: 0 K/UL — SIGNIFICANT CHANGE UP (ref 0–0.5)
EOSINOPHIL NFR BLD AUTO: 0 % — SIGNIFICANT CHANGE UP (ref 0–6)
ESTIMATED AVERAGE GLUCOSE: 126 MG/DL — HIGH (ref 68–114)
GLUCOSE SERPL-MCNC: 114 MG/DL — HIGH (ref 70–99)
HCT VFR BLD CALC: 36.7 % — SIGNIFICANT CHANGE UP (ref 34.5–45)
HGB BLD-MCNC: 11.7 G/DL — SIGNIFICANT CHANGE UP (ref 11.5–15.5)
IMM GRANULOCYTES NFR BLD AUTO: 0.5 % — SIGNIFICANT CHANGE UP (ref 0–1.5)
LDLC SERPL DIRECT ASSAY-MCNC: 53 MG/DL — SIGNIFICANT CHANGE UP
LYMPHOCYTES # BLD AUTO: 0.95 K/UL — LOW (ref 1–3.3)
LYMPHOCYTES # BLD AUTO: 8 % — LOW (ref 13–44)
MAGNESIUM SERPL-MCNC: 2.1 MG/DL — SIGNIFICANT CHANGE UP (ref 1.6–2.6)
MCHC RBC-ENTMCNC: 30 PG — SIGNIFICANT CHANGE UP (ref 27–34)
MCHC RBC-ENTMCNC: 31.9 GM/DL — LOW (ref 32–36)
MCV RBC AUTO: 94.1 FL — SIGNIFICANT CHANGE UP (ref 80–100)
MONOCYTES # BLD AUTO: 0.57 K/UL — SIGNIFICANT CHANGE UP (ref 0–0.9)
MONOCYTES NFR BLD AUTO: 4.8 % — SIGNIFICANT CHANGE UP (ref 2–14)
NEUTROPHILS # BLD AUTO: 10.21 K/UL — HIGH (ref 1.8–7.4)
NEUTROPHILS NFR BLD AUTO: 86.5 % — HIGH (ref 43–77)
NRBC # BLD: 0 /100 WBCS — SIGNIFICANT CHANGE UP (ref 0–0)
PHOSPHATE SERPL-MCNC: 3.6 MG/DL — SIGNIFICANT CHANGE UP (ref 2.5–4.5)
PLATELET # BLD AUTO: 182 K/UL — SIGNIFICANT CHANGE UP (ref 150–400)
POTASSIUM SERPL-MCNC: 4.3 MMOL/L — SIGNIFICANT CHANGE UP (ref 3.5–5.3)
POTASSIUM SERPL-SCNC: 4.3 MMOL/L — SIGNIFICANT CHANGE UP (ref 3.5–5.3)
PROT SERPL-MCNC: 6 G/DL — SIGNIFICANT CHANGE UP (ref 6–8.3)
RBC # BLD: 3.9 M/UL — SIGNIFICANT CHANGE UP (ref 3.8–5.2)
RBC # FLD: 14 % — SIGNIFICANT CHANGE UP (ref 10.3–14.5)
SODIUM SERPL-SCNC: 140 MMOL/L — SIGNIFICANT CHANGE UP (ref 135–145)
WBC # BLD: 11.81 K/UL — HIGH (ref 3.8–10.5)
WBC # FLD AUTO: 11.81 K/UL — HIGH (ref 3.8–10.5)

## 2022-09-08 RX ORDER — CILOSTAZOL 100 MG/1
100 TABLET ORAL
Refills: 0 | Status: DISCONTINUED | OUTPATIENT
Start: 2022-09-08 | End: 2022-09-10

## 2022-09-08 RX ORDER — DILTIAZEM HCL 120 MG
30 CAPSULE, EXT RELEASE 24 HR ORAL THREE TIMES A DAY
Refills: 0 | Status: DISCONTINUED | OUTPATIENT
Start: 2022-09-08 | End: 2022-09-09

## 2022-09-08 RX ORDER — TAMSULOSIN HYDROCHLORIDE 0.4 MG/1
0.4 CAPSULE ORAL DAILY
Refills: 0 | Status: DISCONTINUED | OUTPATIENT
Start: 2022-09-08 | End: 2022-09-10

## 2022-09-08 RX ADMIN — BUDESONIDE AND FORMOTEROL FUMARATE DIHYDRATE 2 PUFF(S): 160; 4.5 AEROSOL RESPIRATORY (INHALATION) at 05:03

## 2022-09-08 RX ADMIN — HEPARIN SODIUM 5000 UNIT(S): 5000 INJECTION INTRAVENOUS; SUBCUTANEOUS at 13:54

## 2022-09-08 RX ADMIN — OXYCODONE HYDROCHLORIDE 5 MILLIGRAM(S): 5 TABLET ORAL at 00:28

## 2022-09-08 RX ADMIN — OXYCODONE HYDROCHLORIDE 5 MILLIGRAM(S): 5 TABLET ORAL at 22:54

## 2022-09-08 RX ADMIN — Medication 30 MILLIGRAM(S): at 22:18

## 2022-09-08 RX ADMIN — Medication 81 MILLIGRAM(S): at 13:53

## 2022-09-08 RX ADMIN — GABAPENTIN 300 MILLIGRAM(S): 400 CAPSULE ORAL at 17:13

## 2022-09-08 RX ADMIN — Medication 650 MILLIGRAM(S): at 01:14

## 2022-09-08 RX ADMIN — Medication 30 MILLIGRAM(S): at 13:54

## 2022-09-08 RX ADMIN — OXYCODONE HYDROCHLORIDE 5 MILLIGRAM(S): 5 TABLET ORAL at 05:58

## 2022-09-08 RX ADMIN — ATORVASTATIN CALCIUM 40 MILLIGRAM(S): 80 TABLET, FILM COATED ORAL at 22:13

## 2022-09-08 RX ADMIN — Medication 650 MILLIGRAM(S): at 22:12

## 2022-09-08 RX ADMIN — TIOTROPIUM BROMIDE 1 CAPSULE(S): 18 CAPSULE ORAL; RESPIRATORY (INHALATION) at 13:53

## 2022-09-08 RX ADMIN — Medication 650 MILLIGRAM(S): at 13:53

## 2022-09-08 RX ADMIN — BUDESONIDE AND FORMOTEROL FUMARATE DIHYDRATE 2 PUFF(S): 160; 4.5 AEROSOL RESPIRATORY (INHALATION) at 17:13

## 2022-09-08 RX ADMIN — OXYCODONE HYDROCHLORIDE 5 MILLIGRAM(S): 5 TABLET ORAL at 01:00

## 2022-09-08 RX ADMIN — Medication 1 DROP(S): at 05:02

## 2022-09-08 RX ADMIN — Medication 1 DROP(S): at 10:11

## 2022-09-08 RX ADMIN — HEPARIN SODIUM 5000 UNIT(S): 5000 INJECTION INTRAVENOUS; SUBCUTANEOUS at 22:12

## 2022-09-08 RX ADMIN — Medication 650 MILLIGRAM(S): at 08:56

## 2022-09-08 RX ADMIN — GABAPENTIN 300 MILLIGRAM(S): 400 CAPSULE ORAL at 05:02

## 2022-09-08 RX ADMIN — OXYCODONE HYDROCHLORIDE 5 MILLIGRAM(S): 5 TABLET ORAL at 22:24

## 2022-09-08 RX ADMIN — CILOSTAZOL 100 MILLIGRAM(S): 100 TABLET ORAL at 17:29

## 2022-09-08 RX ADMIN — Medication 1 DROP(S): at 01:14

## 2022-09-08 RX ADMIN — HEPARIN SODIUM 5000 UNIT(S): 5000 INJECTION INTRAVENOUS; SUBCUTANEOUS at 05:02

## 2022-09-08 RX ADMIN — OXYCODONE HYDROCHLORIDE 5 MILLIGRAM(S): 5 TABLET ORAL at 06:20

## 2022-09-08 RX ADMIN — OXYCODONE HYDROCHLORIDE 5 MILLIGRAM(S): 5 TABLET ORAL at 13:55

## 2022-09-08 RX ADMIN — OXYCODONE HYDROCHLORIDE 5 MILLIGRAM(S): 5 TABLET ORAL at 14:55

## 2022-09-08 RX ADMIN — TAMSULOSIN HYDROCHLORIDE 0.4 MILLIGRAM(S): 0.4 CAPSULE ORAL at 13:54

## 2022-09-08 NOTE — PHYSICAL THERAPY INITIAL EVALUATION ADULT - ADDITIONAL COMMENTS
Pt resides in a pvt home w/ son, 15 steps to enter from front door, 8 steps to enter from back door (+HR). PTA pt was independent w/ all mobility & ADLs, uses a rollator for ambulation.

## 2022-09-08 NOTE — PHYSICAL THERAPY INITIAL EVALUATION ADULT - PERTINENT HX OF CURRENT PROBLEM, REHAB EVAL
77 y.o. F PMH former smoker (quit 6/2021), CAD s/p CABGx2 (7/2021), HLD, HTN, GERD, COPD/Emphysema & PAD of both lower extremities with bilateral rest pain, occlusion & stenosis of bilateral carotid arteries now s/p L CEA on 9/7/22.

## 2022-09-08 NOTE — DISCHARGE NOTE PROVIDER - NSDCCPCAREPLAN_GEN_ALL_CORE_FT
PRINCIPAL DISCHARGE DIAGNOSIS  Diagnosis: Status post carotid endarterectomy  Assessment and Plan of Treatment: WOUND CARE: Leave steri strips in place until they come off on their own.  Please pat area dry. You may cover old drain site with gauze and paper tape.    BATHING: Please do not submerge wound underwater. You may shower and/or sponge bathe. Please pat wound dry after showering.    ACTIVITY: No heavy lifting anything more than 10-15lbs or straining. Otherwise, you may return to your usual level of physical activity. If you are taking narcotic pain medication (such as oxycodone or Percocet), do NOT drive a car, operate machinery or make important decisions.  NOTIFY YOUR SURGEON IF: You have any excessive bleeding or pus draining from your wound, any fever (over 100.4 F) or chills, numbness or tingling, syncope, weakness or facial droop, or if your pain is not controlled on your discharge pain medications.  FOLLOW-UP:  1. Please call to make a follow-up appointment with Dr. Lucas in 1-2 weeks upon discharge from the hospital - Please call immediately upon discharge to schedule the appointment  2. Please follow up with your primary care physician in one week regarding your hospitalization.

## 2022-09-08 NOTE — DISCHARGE NOTE PROVIDER - NSDCCPTREATMENT_GEN_ALL_CORE_FT
PRINCIPAL PROCEDURE  Procedure: CEA (carotid endarterectomy)  Findings and Treatment: recover from surgery- outpatient follow up

## 2022-09-08 NOTE — DISCHARGE NOTE PROVIDER - HOSPITAL COURSE
77 year old woman with PMH former smoker (quit 6/2021), CAD s/p CABGx2 (7/2021), HLD, HTN, GERD, COPD/Emphysema and PAD of both lower extremities with bilateral rest pain, occlusion & stenosis of bilateral carotid arteries underwent a L CEA on 9/7.  The patient tolerated the procedure well (see operative report for full details). Postoperatively, diet was advanced as tolerated.  Patient remained neurologically intact with no focal deficits.  Physical therapy evaluated the patient and did not find any needs. On day of discharge, the patient was tolerating diet, ambulating well and pain controlled. She will follow up with Dr. Lucas in 1 week.       77 year old woman with PMH former smoker (quit 6/2021), CAD s/p CABGx2 (7/2021), HLD, HTN, GERD, COPD/Emphysema and PAD of both lower extremities with bilateral rest pain, occlusion & stenosis of bilateral carotid arteries underwent a L CEA on 9/7.  The patient tolerated the procedure well (see operative report for full details). Postoperatively, diet was advanced as tolerated.  Patient remained neurologically intact with no focal deficits.  On 9/9 we obtained a U/A at patient request due to urgency to urinate which was positive. Patient was started on Ceftriaxone and received a total of 2 doses while hospitalized. She was discharged on oral antibiotics to treat her recurrent UTI.  Physical therapy evaluated the patient and did not find any needs. On day of discharge, the patient was tolerating diet, ambulating well and pain controlled. She will follow up with Dr. Lucas in 1 week.      ICU Vital Signs Last 24 Hrs  T(C): 36.9 (10 Sep 2022 09:11), Max: 36.9 (10 Sep 2022 09:11)  T(F): 98.5 (10 Sep 2022 09:11), Max: 98.5 (10 Sep 2022 09:11)  HR: 93 (10 Sep 2022 09:58) (63 - 93)  BP: 113/67 (10 Sep 2022 09:58) (93/57 - 159/71)  BP(mean): --  ABP: --  ABP(mean): --  RR: 18 (10 Sep 2022 09:11) (18 - 19)  SpO2: 93% (10 Sep 2022 09:11) (90% - 94%)    O2 Parameters below as of 10 Sep 2022 09:11  Patient On (Oxygen Delivery Method): room air

## 2022-09-08 NOTE — DISCHARGE NOTE PROVIDER - CARE PROVIDER_API CALL
Florentino Lucas)  Surgery; Vascular Surgery  [address], [address]  [place] [address]  Phone: [phone]  Fax: [phone]  Follow Up Time:

## 2022-09-08 NOTE — DISCHARGE NOTE PROVIDER - NSDCMRMEDTOKEN_GEN_ALL_CORE_FT
Aspir 81 oral delayed release tablet: 1 tab(s) orally once a day  atorvastatin 40 mg oral tablet: 1 tab(s) orally once a day (at bedtime)  cilostazol 100 mg oral tablet: 1 tab(s) orally 2 times a day  dilTIAZem 30 mg oral tablet: 1 tab(s) orally 3 times a day  gabapentin 300 mg oral tablet: 1 tab(s) orally 2 times a day  Imodium 2 mg oral capsule: 1 cap(s) orally every 4 hours, As Needed  Pepcid 20 mg oral tablet: 1 tab(s) orally 2 times a day, As Needed  tamsulosin 0.4 mg oral capsule: 1 cap(s) orally once a day  Trelegy Ellipta 100 mcg-62.5 mcg-25 mcg/inh inhalation powder: 1 puff(s) inhaled once a day   Aspir 81 oral delayed release tablet: 1 tab(s) orally once a day  atorvastatin 40 mg oral tablet: 1 tab(s) orally once a day (at bedtime)  cilostazol 100 mg oral tablet: 1 tab(s) orally 2 times a day  dilTIAZem 30 mg oral tablet: 1 tab(s) orally 3 times a day  gabapentin 300 mg oral tablet: 1 tab(s) orally 2 times a day  Imodium 2 mg oral capsule: 1 cap(s) orally every 4 hours, As Needed  Macrobid 100 mg oral capsule: 1 cap(s) orally 2 times a day   oxyCODONE 5 mg oral capsule: 1 cap(s) orally every 6 hours, As Needed -for severe pain MDD:4   Pepcid 20 mg oral tablet: 1 tab(s) orally 2 times a day, As Needed  tamsulosin 0.4 mg oral capsule: 1 cap(s) orally once a day  Trelegy Ellipta 100 mcg-62.5 mcg-25 mcg/inh inhalation powder: 1 puff(s) inhaled once a day

## 2022-09-08 NOTE — DISCHARGE NOTE PROVIDER - NSDCACTIVITY_GEN_ALL_CORE
Walking - Indoors allowed/No heavy lifting/straining/Walking - Outdoors allowed Walking - Indoors allowed/No heavy lifting/straining/Walking - Outdoors allowed/Follow Instructions Provided by your Surgical Team

## 2022-09-09 LAB
ANION GAP SERPL CALC-SCNC: 10 MMOL/L — SIGNIFICANT CHANGE UP (ref 5–17)
APPEARANCE UR: ABNORMAL
BACTERIA # UR AUTO: ABNORMAL
BILIRUB UR-MCNC: NEGATIVE — SIGNIFICANT CHANGE UP
BUN SERPL-MCNC: 22 MG/DL — SIGNIFICANT CHANGE UP (ref 7–23)
CALCIUM SERPL-MCNC: 8.6 MG/DL — SIGNIFICANT CHANGE UP (ref 8.4–10.5)
CHLORIDE SERPL-SCNC: 109 MMOL/L — HIGH (ref 96–108)
CO2 SERPL-SCNC: 22 MMOL/L — SIGNIFICANT CHANGE UP (ref 22–31)
COLOR SPEC: SIGNIFICANT CHANGE UP
CREAT SERPL-MCNC: 1.22 MG/DL — SIGNIFICANT CHANGE UP (ref 0.5–1.3)
DIFF PNL FLD: ABNORMAL
EGFR: 46 ML/MIN/1.73M2 — LOW
EPI CELLS # UR: 25 /HPF — HIGH
GLUCOSE SERPL-MCNC: 92 MG/DL — SIGNIFICANT CHANGE UP (ref 70–99)
GLUCOSE UR QL: ABNORMAL
HCT VFR BLD CALC: 35.3 % — SIGNIFICANT CHANGE UP (ref 34.5–45)
HGB BLD-MCNC: 11.2 G/DL — LOW (ref 11.5–15.5)
HYALINE CASTS # UR AUTO: 4 /LPF — HIGH (ref 0–2)
KETONES UR-MCNC: NEGATIVE — SIGNIFICANT CHANGE UP
LEUKOCYTE ESTERASE UR-ACNC: ABNORMAL
MAGNESIUM SERPL-MCNC: 2.2 MG/DL — SIGNIFICANT CHANGE UP (ref 1.6–2.6)
MCHC RBC-ENTMCNC: 29.6 PG — SIGNIFICANT CHANGE UP (ref 27–34)
MCHC RBC-ENTMCNC: 31.7 GM/DL — LOW (ref 32–36)
MCV RBC AUTO: 93.4 FL — SIGNIFICANT CHANGE UP (ref 80–100)
NITRITE UR-MCNC: NEGATIVE — SIGNIFICANT CHANGE UP
NRBC # BLD: 0 /100 WBCS — SIGNIFICANT CHANGE UP (ref 0–0)
PH UR: 6 — SIGNIFICANT CHANGE UP (ref 5–8)
PHOSPHATE SERPL-MCNC: 2.7 MG/DL — SIGNIFICANT CHANGE UP (ref 2.5–4.5)
PLATELET # BLD AUTO: 180 K/UL — SIGNIFICANT CHANGE UP (ref 150–400)
POTASSIUM SERPL-MCNC: 3.9 MMOL/L — SIGNIFICANT CHANGE UP (ref 3.5–5.3)
POTASSIUM SERPL-SCNC: 3.9 MMOL/L — SIGNIFICANT CHANGE UP (ref 3.5–5.3)
PROT UR-MCNC: ABNORMAL
RBC # BLD: 3.78 M/UL — LOW (ref 3.8–5.2)
RBC # FLD: 14.2 % — SIGNIFICANT CHANGE UP (ref 10.3–14.5)
RBC CASTS # UR COMP ASSIST: 4 /HPF — SIGNIFICANT CHANGE UP (ref 0–4)
SODIUM SERPL-SCNC: 141 MMOL/L — SIGNIFICANT CHANGE UP (ref 135–145)
SP GR SPEC: 1.03 — HIGH (ref 1.01–1.02)
UROBILINOGEN FLD QL: NEGATIVE — SIGNIFICANT CHANGE UP
WBC # BLD: 9.46 K/UL — SIGNIFICANT CHANGE UP (ref 3.8–10.5)
WBC # FLD AUTO: 9.46 K/UL — SIGNIFICANT CHANGE UP (ref 3.8–10.5)
WBC UR QL: 24 /HPF — HIGH (ref 0–5)

## 2022-09-09 RX ORDER — DILTIAZEM HCL 120 MG
30 CAPSULE, EXT RELEASE 24 HR ORAL THREE TIMES A DAY
Refills: 0 | Status: DISCONTINUED | OUTPATIENT
Start: 2022-09-09 | End: 2022-09-10

## 2022-09-09 RX ORDER — OXYCODONE HYDROCHLORIDE 5 MG/1
1 TABLET ORAL
Qty: 12 | Refills: 0
Start: 2022-09-09 | End: 2022-09-11

## 2022-09-09 RX ORDER — DILTIAZEM HCL 120 MG
30 CAPSULE, EXT RELEASE 24 HR ORAL THREE TIMES A DAY
Refills: 0 | Status: DISCONTINUED | OUTPATIENT
Start: 2022-09-09 | End: 2022-09-09

## 2022-09-09 RX ORDER — NITROFURANTOIN MACROCRYSTAL 50 MG
1 CAPSULE ORAL
Qty: 10 | Refills: 0
Start: 2022-09-09 | End: 2022-09-13

## 2022-09-09 RX ORDER — SODIUM CHLORIDE 9 MG/ML
500 INJECTION, SOLUTION INTRAVENOUS ONCE
Refills: 0 | Status: COMPLETED | OUTPATIENT
Start: 2022-09-09 | End: 2022-09-09

## 2022-09-09 RX ORDER — DILTIAZEM HCL 120 MG
30 CAPSULE, EXT RELEASE 24 HR ORAL DAILY
Refills: 0 | Status: DISCONTINUED | OUTPATIENT
Start: 2022-09-09 | End: 2022-09-09

## 2022-09-09 RX ORDER — CEFTRIAXONE 500 MG/1
1000 INJECTION, POWDER, FOR SOLUTION INTRAMUSCULAR; INTRAVENOUS EVERY 24 HOURS
Refills: 0 | Status: DISCONTINUED | OUTPATIENT
Start: 2022-09-09 | End: 2022-09-10

## 2022-09-09 RX ADMIN — TAMSULOSIN HYDROCHLORIDE 0.4 MILLIGRAM(S): 0.4 CAPSULE ORAL at 11:16

## 2022-09-09 RX ADMIN — HEPARIN SODIUM 5000 UNIT(S): 5000 INJECTION INTRAVENOUS; SUBCUTANEOUS at 21:14

## 2022-09-09 RX ADMIN — ATORVASTATIN CALCIUM 40 MILLIGRAM(S): 80 TABLET, FILM COATED ORAL at 21:14

## 2022-09-09 RX ADMIN — CILOSTAZOL 100 MILLIGRAM(S): 100 TABLET ORAL at 06:08

## 2022-09-09 RX ADMIN — CILOSTAZOL 100 MILLIGRAM(S): 100 TABLET ORAL at 17:17

## 2022-09-09 RX ADMIN — OXYCODONE HYDROCHLORIDE 5 MILLIGRAM(S): 5 TABLET ORAL at 06:17

## 2022-09-09 RX ADMIN — Medication 650 MILLIGRAM(S): at 23:37

## 2022-09-09 RX ADMIN — OXYCODONE HYDROCHLORIDE 5 MILLIGRAM(S): 5 TABLET ORAL at 06:47

## 2022-09-09 RX ADMIN — Medication 650 MILLIGRAM(S): at 11:16

## 2022-09-09 RX ADMIN — GABAPENTIN 300 MILLIGRAM(S): 400 CAPSULE ORAL at 17:17

## 2022-09-09 RX ADMIN — HEPARIN SODIUM 5000 UNIT(S): 5000 INJECTION INTRAVENOUS; SUBCUTANEOUS at 06:08

## 2022-09-09 RX ADMIN — Medication 30 MILLIGRAM(S): at 21:13

## 2022-09-09 RX ADMIN — BUDESONIDE AND FORMOTEROL FUMARATE DIHYDRATE 2 PUFF(S): 160; 4.5 AEROSOL RESPIRATORY (INHALATION) at 17:17

## 2022-09-09 RX ADMIN — Medication 650 MILLIGRAM(S): at 17:16

## 2022-09-09 RX ADMIN — CEFTRIAXONE 100 MILLIGRAM(S): 500 INJECTION, POWDER, FOR SOLUTION INTRAMUSCULAR; INTRAVENOUS at 15:58

## 2022-09-09 RX ADMIN — TIOTROPIUM BROMIDE 1 CAPSULE(S): 18 CAPSULE ORAL; RESPIRATORY (INHALATION) at 11:14

## 2022-09-09 RX ADMIN — SODIUM CHLORIDE 500 MILLILITER(S): 9 INJECTION, SOLUTION INTRAVENOUS at 11:00

## 2022-09-09 RX ADMIN — GABAPENTIN 300 MILLIGRAM(S): 400 CAPSULE ORAL at 06:07

## 2022-09-09 RX ADMIN — Medication 81 MILLIGRAM(S): at 11:16

## 2022-09-09 RX ADMIN — BUDESONIDE AND FORMOTEROL FUMARATE DIHYDRATE 2 PUFF(S): 160; 4.5 AEROSOL RESPIRATORY (INHALATION) at 06:07

## 2022-09-09 RX ADMIN — Medication 30 MILLIGRAM(S): at 06:08

## 2022-09-09 RX ADMIN — HEPARIN SODIUM 5000 UNIT(S): 5000 INJECTION INTRAVENOUS; SUBCUTANEOUS at 13:49

## 2022-09-10 VITALS
TEMPERATURE: 98 F | HEART RATE: 82 BPM | RESPIRATION RATE: 17 BRPM | DIASTOLIC BLOOD PRESSURE: 76 MMHG | OXYGEN SATURATION: 95 % | SYSTOLIC BLOOD PRESSURE: 168 MMHG

## 2022-09-10 LAB
CULTURE RESULTS: SIGNIFICANT CHANGE UP
SPECIMEN SOURCE: SIGNIFICANT CHANGE UP

## 2022-09-10 PROCEDURE — C1889: CPT

## 2022-09-10 PROCEDURE — C1768: CPT

## 2022-09-10 PROCEDURE — 80053 COMPREHEN METABOLIC PANEL: CPT

## 2022-09-10 PROCEDURE — 85014 HEMATOCRIT: CPT

## 2022-09-10 PROCEDURE — 88311 DECALCIFY TISSUE: CPT

## 2022-09-10 PROCEDURE — 82947 ASSAY GLUCOSE BLOOD QUANT: CPT

## 2022-09-10 PROCEDURE — C9803: CPT

## 2022-09-10 PROCEDURE — 88304 TISSUE EXAM BY PATHOLOGIST: CPT

## 2022-09-10 PROCEDURE — C9399: CPT

## 2022-09-10 PROCEDURE — U0005: CPT

## 2022-09-10 PROCEDURE — 82330 ASSAY OF CALCIUM: CPT

## 2022-09-10 PROCEDURE — 85018 HEMOGLOBIN: CPT

## 2022-09-10 PROCEDURE — 83605 ASSAY OF LACTIC ACID: CPT

## 2022-09-10 PROCEDURE — 87086 URINE CULTURE/COLONY COUNT: CPT

## 2022-09-10 PROCEDURE — 85025 COMPLETE CBC W/AUTO DIFF WBC: CPT

## 2022-09-10 PROCEDURE — 94640 AIRWAY INHALATION TREATMENT: CPT

## 2022-09-10 PROCEDURE — 83721 ASSAY OF BLOOD LIPOPROTEIN: CPT

## 2022-09-10 PROCEDURE — 81001 URINALYSIS AUTO W/SCOPE: CPT

## 2022-09-10 PROCEDURE — 84295 ASSAY OF SERUM SODIUM: CPT

## 2022-09-10 PROCEDURE — 83036 HEMOGLOBIN GLYCOSYLATED A1C: CPT

## 2022-09-10 PROCEDURE — 86900 BLOOD TYPING SEROLOGIC ABO: CPT

## 2022-09-10 PROCEDURE — 83735 ASSAY OF MAGNESIUM: CPT

## 2022-09-10 PROCEDURE — 84132 ASSAY OF SERUM POTASSIUM: CPT

## 2022-09-10 PROCEDURE — 82435 ASSAY OF BLOOD CHLORIDE: CPT

## 2022-09-10 PROCEDURE — U0003: CPT

## 2022-09-10 PROCEDURE — 93005 ELECTROCARDIOGRAM TRACING: CPT

## 2022-09-10 PROCEDURE — 85027 COMPLETE CBC AUTOMATED: CPT

## 2022-09-10 PROCEDURE — 97161 PT EVAL LOW COMPLEX 20 MIN: CPT

## 2022-09-10 PROCEDURE — 86901 BLOOD TYPING SEROLOGIC RH(D): CPT

## 2022-09-10 PROCEDURE — 82565 ASSAY OF CREATININE: CPT

## 2022-09-10 PROCEDURE — 80048 BASIC METABOLIC PNL TOTAL CA: CPT

## 2022-09-10 PROCEDURE — 84100 ASSAY OF PHOSPHORUS: CPT

## 2022-09-10 PROCEDURE — 82803 BLOOD GASES ANY COMBINATION: CPT

## 2022-09-10 PROCEDURE — C1769: CPT

## 2022-09-10 PROCEDURE — 86850 RBC ANTIBODY SCREEN: CPT

## 2022-09-10 RX ORDER — OXYCODONE HYDROCHLORIDE 5 MG/1
1 TABLET ORAL
Qty: 8 | Refills: 0
Start: 2022-09-10 | End: 2022-09-11

## 2022-09-10 RX ADMIN — CEFTRIAXONE 100 MILLIGRAM(S): 500 INJECTION, POWDER, FOR SOLUTION INTRAMUSCULAR; INTRAVENOUS at 12:05

## 2022-09-10 RX ADMIN — Medication 650 MILLIGRAM(S): at 11:24

## 2022-09-10 RX ADMIN — GABAPENTIN 300 MILLIGRAM(S): 400 CAPSULE ORAL at 05:42

## 2022-09-10 RX ADMIN — OXYCODONE HYDROCHLORIDE 5 MILLIGRAM(S): 5 TABLET ORAL at 06:55

## 2022-09-10 RX ADMIN — HEPARIN SODIUM 5000 UNIT(S): 5000 INJECTION INTRAVENOUS; SUBCUTANEOUS at 05:41

## 2022-09-10 RX ADMIN — Medication 30 MILLIGRAM(S): at 05:42

## 2022-09-10 RX ADMIN — Medication 81 MILLIGRAM(S): at 11:24

## 2022-09-10 RX ADMIN — OXYCODONE HYDROCHLORIDE 5 MILLIGRAM(S): 5 TABLET ORAL at 05:55

## 2022-09-10 RX ADMIN — CILOSTAZOL 100 MILLIGRAM(S): 100 TABLET ORAL at 05:42

## 2022-09-10 RX ADMIN — TAMSULOSIN HYDROCHLORIDE 0.4 MILLIGRAM(S): 0.4 CAPSULE ORAL at 11:24

## 2022-09-10 RX ADMIN — BUDESONIDE AND FORMOTEROL FUMARATE DIHYDRATE 2 PUFF(S): 160; 4.5 AEROSOL RESPIRATORY (INHALATION) at 05:43

## 2022-09-10 RX ADMIN — Medication 650 MILLIGRAM(S): at 05:41

## 2022-09-10 RX ADMIN — TIOTROPIUM BROMIDE 1 CAPSULE(S): 18 CAPSULE ORAL; RESPIRATORY (INHALATION) at 11:25

## 2022-09-10 NOTE — PROGRESS NOTE ADULT - SUBJECTIVE AND OBJECTIVE BOX
Vascular Surgery Progress Note  Patient is a 77y old  Female who presents with a chief complaint of Pain in both legs affecting her ability to walk long distances, occlusion & stenosis of bilateral carotid arteries (08 Sep 2022 17:11)      INTERVAL EVENTS: No acute events overnight.  SUBJECTIVE: Patient seen and examined at bedside with surgical team, patient without complaints.    OBJECTIVE:    Vital Signs Last 24 Hrs  T(C): 36.4 (09 Sep 2022 05:57), Max: 36.9 (08 Sep 2022 22:00)  T(F): 97.6 (09 Sep 2022 05:57), Max: 98.5 (08 Sep 2022 22:00)  HR: 68 (09 Sep 2022 05:57) (66 - 90)  BP: 120/69 (09 Sep 2022 05:57) (102/55 - 157/83)  BP(mean): --  RR: 18 (09 Sep 2022 05:57) (16 - 18)  SpO2: 93% (09 Sep 2022 05:57) (93% - 97%)    Parameters below as of 09 Sep 2022 05:57  Patient On (Oxygen Delivery Method): nasal cannula  O2 Flow (L/min): 1  I&O's Detail    08 Sep 2022 07:01  -  09 Sep 2022 07:00  --------------------------------------------------------  IN:    Oral Fluid: 1440 mL  Total IN: 1440 mL    OUT:    Bulb (mL): 20 mL    Voided (mL): 400 mL  Total OUT: 420 mL    Total NET: 1020 mL      MEDICATIONS  (STANDING):  acetaminophen     Tablet .. 650 milliGRAM(s) Oral every 6 hours  aspirin enteric coated 81 milliGRAM(s) Oral daily  atorvastatin 40 milliGRAM(s) Oral at bedtime  budesonide  80 MICROgram(s)/formoterol 4.5 MICROgram(s) Inhaler 2 Puff(s) Inhalation two times a day  cilostazol 100 milliGRAM(s) Oral two times a day  diltiazem    Tablet 30 milliGRAM(s) Oral three times a day  gabapentin 300 milliGRAM(s) Oral two times a day  heparin   Injectable 5000 Unit(s) SubCutaneous every 8 hours  tamsulosin 0.4 milliGRAM(s) Oral daily  tiotropium 18 MICROgram(s) Capsule 1 Capsule(s) Inhalation daily    MEDICATIONS  (PRN):  famotidine    Tablet 20 milliGRAM(s) Oral daily PRN GERD  oxyCODONE    IR 5 milliGRAM(s) Oral every 4 hours PRN Moderate Pain (4 - 6)  senna 2 Tablet(s) Oral at bedtime PRN Constipation      PHYSICAL EXAM:  Constitutional: A&Ox3, NAD  Respiratory: Unlabored breathing  Abdomen: Soft, nondistended, NTTP. No rebound or guarding.  MSK/VASC: Moving all extremities spontaneously, bilateral radial, DP, and PT pulses palpable. L sided tongue deviation at baseline, otherwise CN III-XII intact, motor and sensation of bilateral upper and lower extremities intact. Incision c/d/i    LABS:                        11.2   9.46  )-----------( 180      ( 09 Sep 2022 07:39 )             35.3     -    141  |  109<H>  |  22  ----------------------------<  92  3.9   |  22  |  1.22    Ca    8.6      09 Sep 2022 07:39  Phos  2.7       Mg     2.2         TPro  6.0  /  Alb  3.3  /  TBili  0.3  /  DBili  x   /  AST  14  /  ALT  8<L>  /  AlkPhos  150<H>        LIVER FUNCTIONS - ( 08 Sep 2022 07:26 )  Alb: 3.3 g/dL / Pro: 6.0 g/dL / ALK PHOS: 150 U/L / ALT: 8 U/L / AST: 14 U/L / GGT: x           Urinalysis Basic - ( 09 Sep 2022 08:40 )    Color: Light Yellow / Appearance: Slightly Turbid / S.026 / pH: x  Gluc: x / Ketone: Negative  / Bili: Negative / Urobili: Negative   Blood: x / Protein: Trace / Nitrite: Negative   Leuk Esterase: Large / RBC: 4 /hpf / WBC 24 /HPF   Sq Epi: x / Non Sq Epi: 25 /hpf / Bacteria: Few  
Vascular Surgery Progress Note  Patient is a 77y old  Female who presents with a chief complaint of Pain in both legs affecting her ability to walk long distances, occlusion & stenosis of bilateral carotid arteries (08 Sep 2022 17:11)      INTERVAL EVENTS: No acute events overnight.  SUBJECTIVE: Patient seen and examined at bedside with surgical team, patient without complaints.     OBJECTIVE:    Vital Signs Last 24 Hrs  T(C): 36.4 (09 Sep 2022 05:57), Max: 36.9 (08 Sep 2022 22:00)  T(F): 97.6 (09 Sep 2022 05:57), Max: 98.5 (08 Sep 2022 22:00)  HR: 68 (09 Sep 2022 05:57) (66 - 90)  BP: 120/69 (09 Sep 2022 05:57) (102/55 - 157/83)  BP(mean): --  RR: 18 (09 Sep 2022 05:57) (16 - 18)  SpO2: 93% (09 Sep 2022 05:57) (93% - 97%)    Parameters below as of 09 Sep 2022 05:57  Patient On (Oxygen Delivery Method): nasal cannula  O2 Flow (L/min): 1  I&O's Detail    08 Sep 2022 07:01  -  09 Sep 2022 07:00  --------------------------------------------------------  IN:    Oral Fluid: 1440 mL  Total IN: 1440 mL    OUT:    Bulb (mL): 20 mL    Voided (mL): 400 mL  Total OUT: 420 mL    Total NET: 1020 mL      MEDICATIONS  (STANDING):  acetaminophen     Tablet .. 650 milliGRAM(s) Oral every 6 hours  aspirin enteric coated 81 milliGRAM(s) Oral daily  atorvastatin 40 milliGRAM(s) Oral at bedtime  budesonide  80 MICROgram(s)/formoterol 4.5 MICROgram(s) Inhaler 2 Puff(s) Inhalation two times a day  cilostazol 100 milliGRAM(s) Oral two times a day  diltiazem    Tablet 30 milliGRAM(s) Oral three times a day  gabapentin 300 milliGRAM(s) Oral two times a day  heparin   Injectable 5000 Unit(s) SubCutaneous every 8 hours  tamsulosin 0.4 milliGRAM(s) Oral daily  tiotropium 18 MICROgram(s) Capsule 1 Capsule(s) Inhalation daily    MEDICATIONS  (PRN):  famotidine    Tablet 20 milliGRAM(s) Oral daily PRN GERD  oxyCODONE    IR 5 milliGRAM(s) Oral every 4 hours PRN Moderate Pain (4 - 6)  senna 2 Tablet(s) Oral at bedtime PRN Constipation      PHYSICAL EXAM:  Constitutional: A&Ox3, NAD  Respiratory: Unlabored breathing  Abdomen: Soft, nondistended, NTTP. No rebound or guarding.  MSK/VASC: Moving all extremities spontaneously, bilateral radial, DP, and PT pulses palpable. L sided tongue deviation at baseline, otherwise CN III-XII intact, motor and sensation of bilateral upper and lower extremities intact. Incision c/d/i    LABS:                        11.2   9.46  )-----------( 180      ( 09 Sep 2022 07:39 )             35.3     -    141  |  109<H>  |  22  ----------------------------<  92  3.9   |  22  |  1.22    Ca    8.6      09 Sep 2022 07:39  Phos  2.7       Mg     2.2         TPro  6.0  /  Alb  3.3  /  TBili  0.3  /  DBili  x   /  AST  14  /  ALT  8<L>  /  AlkPhos  150<H>        LIVER FUNCTIONS - ( 08 Sep 2022 07:26 )  Alb: 3.3 g/dL / Pro: 6.0 g/dL / ALK PHOS: 150 U/L / ALT: 8 U/L / AST: 14 U/L / GGT: x           Urinalysis Basic - ( 09 Sep 2022 08:40 )    Color: Light Yellow / Appearance: Slightly Turbid / S.026 / pH: x  Gluc: x / Ketone: Negative  / Bili: Negative / Urobili: Negative   Blood: x / Protein: Trace / Nitrite: Negative   Leuk Esterase: Large / RBC: 4 /hpf / WBC 24 /HPF   Sq Epi: x / Non Sq Epi: 25 /hpf / Bacteria: Few    
DATE OF SERVICE: 09-09-22 @ 06:48    Subjective: Patient seen and examined. No new events except as noted.     SUBJECTIVE/ROS:  feels ok   No chest pain, dyspnea, palpitation, or dizziness.       MEDICATIONS:  MEDICATIONS  (STANDING):  acetaminophen     Tablet .. 650 milliGRAM(s) Oral every 6 hours  aspirin enteric coated 81 milliGRAM(s) Oral daily  atorvastatin 40 milliGRAM(s) Oral at bedtime  budesonide  80 MICROgram(s)/formoterol 4.5 MICROgram(s) Inhaler 2 Puff(s) Inhalation two times a day  cilostazol 100 milliGRAM(s) Oral two times a day  diltiazem    Tablet 30 milliGRAM(s) Oral three times a day  gabapentin 300 milliGRAM(s) Oral two times a day  heparin   Injectable 5000 Unit(s) SubCutaneous every 8 hours  tamsulosin 0.4 milliGRAM(s) Oral daily  tiotropium 18 MICROgram(s) Capsule 1 Capsule(s) Inhalation daily      PHYSICAL EXAM:  T(C): 36.4 (09-09-22 @ 05:57), Max: 36.9 (09-08-22 @ 22:00)  HR: 68 (09-09-22 @ 05:57) (66 - 90)  BP: 120/69 (09-09-22 @ 05:57) (102/55 - 157/83)  RR: 18 (09-09-22 @ 05:57) (16 - 18)  SpO2: 93% (09-09-22 @ 05:57) (93% - 97%)  Wt(kg): --  I&O's Summary    07 Sep 2022 07:01  -  08 Sep 2022 07:00  --------------------------------------------------------  IN: 1865 mL / OUT: 2195 mL / NET: -330 mL    08 Sep 2022 07:01  -  09 Sep 2022 06:48  --------------------------------------------------------  IN: 1320 mL / OUT: 420 mL / NET: 900 mL            JVP: Normal  Neck: supple  Lung: clear   CV: S1 S2 , Murmur:  Abd: soft  Ext: No edema  neuro: Awake / alert  Psych: flat affect  Skin: normal``    LABS/DATA:    CARDIAC MARKERS:                                11.7   11.81 )-----------( 182      ( 08 Sep 2022 07:29 )             36.7     09-08    140  |  107  |  12  ----------------------------<  114<H>  4.3   |  20<L>  |  0.92    Ca    9.0      08 Sep 2022 07:26  Phos  3.6     09-08  Mg     2.1     09-08    TPro  6.0  /  Alb  3.3  /  TBili  0.3  /  DBili  x   /  AST  14  /  ALT  8<L>  /  AlkPhos  150<H>  09-08    proBNP:   Lipid Profile:   HgA1c:   TSH:     TELE:  EKG:        
DATE OF SERVICE: 09-10-22 @ 12:58    Subjective: Patient seen and examined. No new events except as noted.     SUBJECTIVE/ROS:  nad      MEDICATIONS:  MEDICATIONS  (STANDING):  acetaminophen     Tablet .. 650 milliGRAM(s) Oral every 6 hours  aspirin enteric coated 81 milliGRAM(s) Oral daily  atorvastatin 40 milliGRAM(s) Oral at bedtime  budesonide  80 MICROgram(s)/formoterol 4.5 MICROgram(s) Inhaler 2 Puff(s) Inhalation two times a day  cefTRIAXone   IVPB 1000 milliGRAM(s) IV Intermittent every 24 hours  cilostazol 100 milliGRAM(s) Oral two times a day  diltiazem    Tablet 30 milliGRAM(s) Oral three times a day  gabapentin 300 milliGRAM(s) Oral two times a day  heparin   Injectable 5000 Unit(s) SubCutaneous every 8 hours  tamsulosin 0.4 milliGRAM(s) Oral daily  tiotropium 18 MICROgram(s) Capsule 1 Capsule(s) Inhalation daily      PHYSICAL EXAM:  T(C): 36.9 (09-10-22 @ 09:11), Max: 36.9 (09-10-22 @ 09:11)  HR: 93 (09-10-22 @ 09:58) (63 - 93)  BP: 113/67 (09-10-22 @ 09:58) (93/57 - 159/71)  RR: 18 (09-10-22 @ 09:11) (18 - 19)  SpO2: 93% (09-10-22 @ 09:11) (90% - 94%)  Wt(kg): --  I&O's Summary    09 Sep 2022 07:01  -  10 Sep 2022 07:00  --------------------------------------------------------  IN: 1110 mL / OUT: 1350 mL / NET: -240 mL    10 Sep 2022 07:01  -  10 Sep 2022 12:58  --------------------------------------------------------  IN: 240 mL / OUT: 0 mL / NET: 240 mL            JVP: Normal  Neck: supple  Lung: clear   CV: S1 S2 , Murmur:  Abd: soft  Ext: No edema  neuro: Awake / alert  Psych: flat affect  Skin: normal``    LABS/DATA:    CARDIAC MARKERS:                                11.2   9.46  )-----------( 180      ( 09 Sep 2022 07:39 )             35.3     09-09    141  |  109<H>  |  22  ----------------------------<  92  3.9   |  22  |  1.22    Ca    8.6      09 Sep 2022 07:39  Phos  2.7     09-09  Mg     2.2     09-09      proBNP:   Lipid Profile:   HgA1c:   TSH:     TELE:  EKG:

## 2022-09-10 NOTE — PROGRESS NOTE ADULT - ASSESSMENT
A/P:  Ms. Kay is a 78 yo female with hx of CAD s/p CABGx2, COPD/emphysema, and PAD of bilateral LE presenting with b/l carotid stenoses. POD#1 s/p L CEA.    - pain control  - regular diet  - dc IVF  - OOB  - f/u TOV  - PT c/s  - dispo: home tomorrow    Vascular sx  [id number]
Carotid occlusion   s/p CEA   fu with vascular     CAD  asa  statin   s/p CABG    COPD  inhalers   
Carotid occlusion   s/p CEA   fu with vascular     CAD  asa  statin   s/p CABG    COPD  inhalers   
A/P:  Ms. Kay is a 76 yo female with hx of CAD s/p CABGx2, COPD/emphysema, and PAD of bilateral LE presenting with b/l carotid stenoses. POD#2 s/p L CEA.    - pain control  - regular diet  - OOB  - PT recs no needs  - dc today    Vascular sx  [id number]

## 2022-09-12 LAB — SURGICAL PATHOLOGY STUDY: SIGNIFICANT CHANGE UP

## 2022-10-25 ENCOUNTER — OUTPATIENT (OUTPATIENT)
Dept: OUTPATIENT SERVICES | Facility: HOSPITAL | Age: 77
LOS: 1 days | End: 2022-10-25
Payer: MEDICARE

## 2022-10-25 ENCOUNTER — APPOINTMENT (OUTPATIENT)
Dept: RADIOLOGY | Facility: CLINIC | Age: 77
End: 2022-10-25

## 2022-10-25 DIAGNOSIS — Z95.1 PRESENCE OF AORTOCORONARY BYPASS GRAFT: Chronic | ICD-10-CM

## 2022-10-25 DIAGNOSIS — Z00.8 ENCOUNTER FOR OTHER GENERAL EXAMINATION: ICD-10-CM

## 2022-10-25 PROCEDURE — 72050 X-RAY EXAM NECK SPINE 4/5VWS: CPT

## 2022-10-25 PROCEDURE — 72050 X-RAY EXAM NECK SPINE 4/5VWS: CPT | Mod: 26

## 2022-10-28 ENCOUNTER — OUTPATIENT (OUTPATIENT)
Dept: OUTPATIENT SERVICES | Facility: HOSPITAL | Age: 77
LOS: 1 days | End: 2022-10-28
Payer: MEDICARE

## 2022-10-28 VITALS
WEIGHT: 143.08 LBS | HEART RATE: 66 BPM | DIASTOLIC BLOOD PRESSURE: 84 MMHG | RESPIRATION RATE: 16 BRPM | OXYGEN SATURATION: 95 % | SYSTOLIC BLOOD PRESSURE: 129 MMHG | HEIGHT: 62 IN | TEMPERATURE: 99 F

## 2022-10-28 DIAGNOSIS — S72.009A FRACTURE OF UNSPECIFIED PART OF NECK OF UNSPECIFIED FEMUR, INITIAL ENCOUNTER FOR CLOSED FRACTURE: Chronic | ICD-10-CM

## 2022-10-28 DIAGNOSIS — Z29.9 ENCOUNTER FOR PROPHYLACTIC MEASURES, UNSPECIFIED: ICD-10-CM

## 2022-10-28 DIAGNOSIS — Z98.890 OTHER SPECIFIED POSTPROCEDURAL STATES: Chronic | ICD-10-CM

## 2022-10-28 DIAGNOSIS — Z95.1 PRESENCE OF AORTOCORONARY BYPASS GRAFT: Chronic | ICD-10-CM

## 2022-10-28 DIAGNOSIS — I70.223 ATHEROSCLEROSIS OF NATIVE ARTERIES OF EXTREMITIES WITH REST PAIN, BILATERAL LEGS: ICD-10-CM

## 2022-10-28 DIAGNOSIS — Z01.818 ENCOUNTER FOR OTHER PREPROCEDURAL EXAMINATION: ICD-10-CM

## 2022-10-28 LAB
ANION GAP SERPL CALC-SCNC: 12 MMOL/L — SIGNIFICANT CHANGE UP (ref 5–17)
BLD GP AB SCN SERPL QL: NEGATIVE — SIGNIFICANT CHANGE UP
BUN SERPL-MCNC: 11 MG/DL — SIGNIFICANT CHANGE UP (ref 7–23)
CALCIUM SERPL-MCNC: 9.7 MG/DL — SIGNIFICANT CHANGE UP (ref 8.4–10.5)
CHLORIDE SERPL-SCNC: 105 MMOL/L — SIGNIFICANT CHANGE UP (ref 96–108)
CO2 SERPL-SCNC: 24 MMOL/L — SIGNIFICANT CHANGE UP (ref 22–31)
CREAT SERPL-MCNC: 1.22 MG/DL — SIGNIFICANT CHANGE UP (ref 0.5–1.3)
EGFR: 46 ML/MIN/1.73M2 — LOW
GLUCOSE SERPL-MCNC: 93 MG/DL — SIGNIFICANT CHANGE UP (ref 70–99)
HCT VFR BLD CALC: 41.2 % — SIGNIFICANT CHANGE UP (ref 34.5–45)
HGB BLD-MCNC: 13.2 G/DL — SIGNIFICANT CHANGE UP (ref 11.5–15.5)
MCHC RBC-ENTMCNC: 29.9 PG — SIGNIFICANT CHANGE UP (ref 27–34)
MCHC RBC-ENTMCNC: 32 GM/DL — SIGNIFICANT CHANGE UP (ref 32–36)
MCV RBC AUTO: 93.4 FL — SIGNIFICANT CHANGE UP (ref 80–100)
NRBC # BLD: 0 /100 WBCS — SIGNIFICANT CHANGE UP (ref 0–0)
PLATELET # BLD AUTO: 246 K/UL — SIGNIFICANT CHANGE UP (ref 150–400)
POTASSIUM SERPL-MCNC: 3.8 MMOL/L — SIGNIFICANT CHANGE UP (ref 3.5–5.3)
POTASSIUM SERPL-SCNC: 3.8 MMOL/L — SIGNIFICANT CHANGE UP (ref 3.5–5.3)
RBC # BLD: 4.41 M/UL — SIGNIFICANT CHANGE UP (ref 3.8–5.2)
RBC # FLD: 14.8 % — HIGH (ref 10.3–14.5)
RH IG SCN BLD-IMP: POSITIVE — SIGNIFICANT CHANGE UP
SODIUM SERPL-SCNC: 141 MMOL/L — SIGNIFICANT CHANGE UP (ref 135–145)
WBC # BLD: 8.58 K/UL — SIGNIFICANT CHANGE UP (ref 3.8–10.5)
WBC # FLD AUTO: 8.58 K/UL — SIGNIFICANT CHANGE UP (ref 3.8–10.5)

## 2022-10-28 PROCEDURE — 80048 BASIC METABOLIC PNL TOTAL CA: CPT

## 2022-10-28 PROCEDURE — 86901 BLOOD TYPING SEROLOGIC RH(D): CPT

## 2022-10-28 PROCEDURE — 85027 COMPLETE CBC AUTOMATED: CPT

## 2022-10-28 PROCEDURE — G0463: CPT

## 2022-10-28 PROCEDURE — 86850 RBC ANTIBODY SCREEN: CPT

## 2022-10-28 PROCEDURE — 86900 BLOOD TYPING SEROLOGIC ABO: CPT

## 2022-10-28 PROCEDURE — 36415 COLL VENOUS BLD VENIPUNCTURE: CPT

## 2022-10-28 RX ORDER — LIDOCAINE HCL 20 MG/ML
0.2 VIAL (ML) INJECTION ONCE
Refills: 0 | Status: COMPLETED | OUTPATIENT
Start: 2022-11-09 | End: 2022-11-09

## 2022-10-28 RX ORDER — SODIUM CHLORIDE 9 MG/ML
3 INJECTION INTRAMUSCULAR; INTRAVENOUS; SUBCUTANEOUS EVERY 8 HOURS
Refills: 0 | Status: DISCONTINUED | OUTPATIENT
Start: 2022-11-09 | End: 2022-11-09

## 2022-10-28 RX ORDER — CHLORHEXIDINE GLUCONATE 213 G/1000ML
1 SOLUTION TOPICAL ONCE
Refills: 0 | Status: COMPLETED | OUTPATIENT
Start: 2022-11-09 | End: 2022-11-09

## 2022-10-28 RX ORDER — CEFAZOLIN SODIUM 1 G
2000 VIAL (EA) INJECTION ONCE
Refills: 0 | Status: DISCONTINUED | OUTPATIENT
Start: 2022-11-09 | End: 2022-11-09

## 2022-10-28 RX ORDER — LOPERAMIDE HCL 2 MG
1 TABLET ORAL
Qty: 0 | Refills: 0 | DISCHARGE

## 2022-10-28 RX ORDER — FAMOTIDINE 10 MG/ML
1 INJECTION INTRAVENOUS
Qty: 0 | Refills: 0 | DISCHARGE

## 2022-10-28 NOTE — H&P PST ADULT - PRIMARY CARE PROVIDER
Chris Kirkpatrick, PCP, [phone] (seen7/2022); Jez Guzman, Cardio, [phone] (seen 8/9/2022 having carotid dopplers 8/11) Chris Kirkpatrick, PCP, [phone] (preop eval to be completed ); Jez Guzman, Cardio, [phone] (preop eval to be scheduled) Crhis Kirkpatrick, PCP, [phone] (preop eval to be scheduled); Jez Guzman, Cardio, [phone] (preop eval to be scheduled)

## 2022-10-28 NOTE — H&P PST ADULT - NSICDXPASTSURGICALHX_GEN_ALL_CORE_FT
PAST SURGICAL HISTORY:  Femoral neck fracture September 2021    H/O carotid endarterectomy September 2022    S/P CABG x 2 2021

## 2022-10-28 NOTE — H&P PST ADULT - ASSESSMENT
LÓPEZI VTE 2.0 SCORE [CLOT updated 2019]    AGE RELATED RISK FACTORS                                                       MOBILITY RELATED FACTORS  [ ] Age 41-60 years                                            (1 Point)                    [ ] Bed rest                                                        (1 Point)  [ ] Age: 61-74 years                                           (2 Points)                  [ ] Plaster cast                                                   (2 Points)  [3] Age= 75 years                                              (3 Points)                    [ ] Bed bound for more than 72 hours                 (2 Points)    DISEASE RELATED RISK FACTORS                                               GENDER SPECIFIC FACTORS  [1 ] Edema in the lower extremities                       (1 Point)              [ ] Pregnancy                                                     (1 Point)  [ ] Varicose veins                                               (1 Point)                     [ ] Post-partum < 6 weeks                                   (1 Point)             [ ] BMI > 25 Kg/m2                                            (1 Point)                     [ ] Hormonal therapy  or oral contraception          (1 Point)                 [ ] Sepsis (in the previous month)                        (1 Point)               [ ] History of pregnancy complications                 (1 point)  [ ] Pneumonia or serious lung disease                                               [ ] Unexplained or recurrent                     (1 Point)           (in the previous month)                               (1 Point)  [ ] Abnormal pulmonary function test                     (1 Point)                 SURGERY RELATED RISK FACTORS  [ ] Acute myocardial infarction                              (1 Point)               [ ]  Section                                             (1 Point)  [ ] Congestive heart failure (in the previous month)  (1 Point)      [ ] Minor surgery                                                  (1 Point)   [ ] Inflammatory bowel disease                             (1 Point)               [ ] Arthroscopic surgery                                        (2 Points)  [ ] Central venous access                                      (2 Points)                [2 ] General surgery lasting more than 45 minutes (2 points)  [ ] Malignancy- Present or previous                   (2 Points)                [ ] Elective arthroplasty                                         (5 points)    [ ] Stroke (in the previous month)                          (5 Points)                                                                                                                                                           HEMATOLOGY RELATED FACTORS                                                 TRAUMA RELATED RISK FACTORS  [ ] Prior episodes of VTE                                     (3 Points)                [ ] Fracture of the hip, pelvis, or leg                       (5 Points)  [ ] Positive family history for VTE                         (3 Points)             [ ] Acute spinal cord injury (in the previous month)  (5 Points)  [ ] Prothrombin 21330 A                                     (3 Points)               [ ] Paralysis  (less than 1 month)                             (5 Points)  [ ] Factor V Leiden                                             (3 Points)                  [ ] Multiple Trauma within 1 month                        (5 Points)  [ ] Lupus anticoagulants                                     (3 Points)                                                           [ ] Anticardiolipin antibodies                               (3 Points)                                                       [ ] High homocysteine in the blood                      (3 Points)                                             [ ] Other congenital or acquired thrombophilia      (3 Points)                                                [ ] Heparin induced thrombocytopenia                  (3 Points)                                     Total Score [  6        ]

## 2022-10-28 NOTE — H&P PST ADULT - REASON FOR ADMISSION
Pain in both legs affecting her ability to walk long distances, occlusion & stenosis of bilateral carotid arteries Left femoral endarterectomy

## 2022-10-28 NOTE — H&P PST ADULT - ATTENDING COMMENTS
She has PAD with disabling claudication and possibly rest pain in her feet. She has bilateral CFA stenoses and an occluded left external iliac artery. If that occlusion can be crossed she can get an iliac sten and a femoral endarterectomy. She has occluded right iliac arteries that may be stentable. If the right side cannot be crossed she will get a femoral-femoral bypass. The procedure, risks and alternatives were explained and informed consent was obtained.

## 2022-10-28 NOTE — H&P PST ADULT - HISTORY OF PRESENT ILLNESS
Ms. Kay is a 77 year old woman with PMH former smoker (quit 6/2021), CAD s/p CABGx2 (7/2021), HLD, HTN, GERD, COPD/Emphysema and PAD of both lower extremities with bilateral rest pain, occlusion & stenosis of bilateral carotid arteries now scheduled for L CEA on 9/7/22.    **Covd test scheduled for 11/7/2022 at [facility]      Pt is a 78YO F with PMH former smoker (quit 6/2021), CAD s/p CABGx2 (7/2021), HLD, HTN, GERD, COPD/Emphysema, and PAD of both lower extremities with bilateral rest pain, s/p left CEA (8/2022) preswnts to [facility] for left femoral endarterectomy, left iliac stent placement, aortogram, possible right iliac stent, possible femoral femoral bypass on 11/9/2022. Denies any palpitations, SOB, N/V, fever, Covid symptoms or chills.     **Covid test scheduled for 11/7/2022 at [facility]    Pt is a 76 YO F with PMH former smoker (quit 6/2021), CAD s/p CABGx2 (7/2021), HLD, HTN, GERD, COPD/Emphysema, and PAD of both lower extremities with bilateral rest pain, s/p left CEA (8/2022) presents to [facility] for left femoral endarterectomy, left iliac stent placement, aortogram, possible right iliac stent, possible femoral femoral bypass on 11/9/2022. Denies any palpitations, SOB, N/V, fever, Covid symptoms or chills.     **Covid test scheduled for 11/7/2022 at [facility]

## 2022-10-28 NOTE — H&P PST ADULT - PROBLEM SELECTOR PLAN 1
LEFT FEMORAL ENDARTERECTOMY   LEFT ILIAC STENT  AORTOGRAM  POSSIBLE RIGHT ILIAC STENT   POSSIBLE FEMORAL FEMORAL BYPASS     CBC, T&S, BMP in PST  Pre-op education provided - all questions answered   To continue on Asprin until day of surgery LEFT FEMORAL ENDARTERECTOMY   LEFT ILIAC STENT  AORTOGRAM  POSSIBLE RIGHT ILIAC STENT   POSSIBLE FEMORAL FEMORAL BYPASS     CBC, T&S, BMP in PST  Pre-op education provided - all questions answered   Chlorhex soap & instructions given  To continue on Asprin until day of surgery

## 2022-11-08 ENCOUNTER — TRANSCRIPTION ENCOUNTER (OUTPATIENT)
Age: 77
End: 2022-11-08

## 2022-11-09 ENCOUNTER — OUTPATIENT (OUTPATIENT)
Dept: INPATIENT UNIT | Facility: HOSPITAL | Age: 77
LOS: 1 days | End: 2022-11-09
Payer: MEDICARE

## 2022-11-09 ENCOUNTER — TRANSCRIPTION ENCOUNTER (OUTPATIENT)
Age: 77
End: 2022-11-09

## 2022-11-09 VITALS — OXYGEN SATURATION: 100 % | DIASTOLIC BLOOD PRESSURE: 75 MMHG | SYSTOLIC BLOOD PRESSURE: 112 MMHG | HEART RATE: 89 BPM

## 2022-11-09 VITALS
RESPIRATION RATE: 18 BRPM | HEART RATE: 84 BPM | WEIGHT: 143.08 LBS | SYSTOLIC BLOOD PRESSURE: 163 MMHG | TEMPERATURE: 98 F | HEIGHT: 62 IN | OXYGEN SATURATION: 97 % | DIASTOLIC BLOOD PRESSURE: 83 MMHG

## 2022-11-09 DIAGNOSIS — Z98.890 OTHER SPECIFIED POSTPROCEDURAL STATES: Chronic | ICD-10-CM

## 2022-11-09 DIAGNOSIS — Z95.1 PRESENCE OF AORTOCORONARY BYPASS GRAFT: Chronic | ICD-10-CM

## 2022-11-09 DIAGNOSIS — S72.009A FRACTURE OF UNSPECIFIED PART OF NECK OF UNSPECIFIED FEMUR, INITIAL ENCOUNTER FOR CLOSED FRACTURE: Chronic | ICD-10-CM

## 2022-11-09 DIAGNOSIS — I70.223 ATHEROSCLEROSIS OF NATIVE ARTERIES OF EXTREMITIES WITH REST PAIN, BILATERAL LEGS: ICD-10-CM

## 2022-11-09 LAB
GAS PNL BLDA: SIGNIFICANT CHANGE UP
GLUCOSE BLDC GLUCOMTR-MCNC: 135 MG/DL — HIGH (ref 70–99)

## 2022-11-09 PROCEDURE — 82435 ASSAY OF BLOOD CHLORIDE: CPT

## 2022-11-09 PROCEDURE — U0003: CPT

## 2022-11-09 PROCEDURE — 82962 GLUCOSE BLOOD TEST: CPT

## 2022-11-09 PROCEDURE — 82330 ASSAY OF CALCIUM: CPT

## 2022-11-09 PROCEDURE — U0005: CPT

## 2022-11-09 PROCEDURE — C1894: CPT

## 2022-11-09 PROCEDURE — 85018 HEMOGLOBIN: CPT

## 2022-11-09 PROCEDURE — C1769: CPT

## 2022-11-09 PROCEDURE — 84295 ASSAY OF SERUM SODIUM: CPT

## 2022-11-09 PROCEDURE — 82565 ASSAY OF CREATININE: CPT

## 2022-11-09 PROCEDURE — 82947 ASSAY GLUCOSE BLOOD QUANT: CPT

## 2022-11-09 PROCEDURE — 37221: CPT | Mod: 50,74

## 2022-11-09 PROCEDURE — C1887: CPT

## 2022-11-09 PROCEDURE — 84132 ASSAY OF SERUM POTASSIUM: CPT

## 2022-11-09 PROCEDURE — 85014 HEMATOCRIT: CPT

## 2022-11-09 PROCEDURE — 76000 FLUOROSCOPY <1 HR PHYS/QHP: CPT

## 2022-11-09 PROCEDURE — 82803 BLOOD GASES ANY COMBINATION: CPT

## 2022-11-09 PROCEDURE — C1889: CPT

## 2022-11-09 PROCEDURE — C9803: CPT

## 2022-11-09 PROCEDURE — C9399: CPT

## 2022-11-09 PROCEDURE — 83605 ASSAY OF LACTIC ACID: CPT

## 2022-11-09 RX ORDER — ACETAMINOPHEN 500 MG
3 TABLET ORAL
Qty: 0 | Refills: 0 | DISCHARGE
Start: 2022-11-09

## 2022-11-09 RX ORDER — HYDROMORPHONE HYDROCHLORIDE 2 MG/ML
0.2 INJECTION INTRAMUSCULAR; INTRAVENOUS; SUBCUTANEOUS
Refills: 0 | Status: DISCONTINUED | OUTPATIENT
Start: 2022-11-09 | End: 2022-11-09

## 2022-11-09 RX ORDER — APREPITANT 80 MG/1
40 CAPSULE ORAL ONCE
Refills: 0 | Status: COMPLETED | OUTPATIENT
Start: 2022-11-09 | End: 2022-11-09

## 2022-11-09 RX ORDER — OXYCODONE AND ACETAMINOPHEN 5; 325 MG/1; MG/1
1 TABLET ORAL
Qty: 10 | Refills: 0
Start: 2022-11-09 | End: 2022-11-11

## 2022-11-09 RX ORDER — ACETAMINOPHEN 500 MG
975 TABLET ORAL EVERY 6 HOURS
Refills: 0 | Status: DISCONTINUED | OUTPATIENT
Start: 2022-11-09 | End: 2022-11-09

## 2022-11-09 RX ORDER — ONDANSETRON 8 MG/1
4 TABLET, FILM COATED ORAL ONCE
Refills: 0 | Status: DISCONTINUED | OUTPATIENT
Start: 2022-11-09 | End: 2022-11-09

## 2022-11-09 RX ADMIN — CHLORHEXIDINE GLUCONATE 1 APPLICATION(S): 213 SOLUTION TOPICAL at 07:21

## 2022-11-09 RX ADMIN — APREPITANT 40 MILLIGRAM(S): 80 CAPSULE ORAL at 07:21

## 2022-11-09 RX ADMIN — SODIUM CHLORIDE 3 MILLILITER(S): 9 INJECTION INTRAMUSCULAR; INTRAVENOUS; SUBCUTANEOUS at 07:19

## 2022-11-09 NOTE — ASU DISCHARGE PLAN (ADULT/PEDIATRIC) - NS MD DC FALL RISK RISK
For information on Fall & Injury Prevention, visit: [email or web address] OR  [email or web address] OR  https://www.cdc.gov/steadi/patient.html

## 2022-11-09 NOTE — BRIEF OPERATIVE NOTE - OPERATION/FINDINGS
Bilateral groins draped and prepped in the standard fashion. L CFA accessed via US however wire was unable to be threaded beyond proximal calcifications. Repeated attempt at R CFA without success due to proximal stenosis. Procedure aborted at this time.

## 2022-11-09 NOTE — BRIEF OPERATIVE NOTE - NSICDXBRIEFPOSTOP_GEN_ALL_CORE_FT
POST-OP DIAGNOSIS:  Peripheral artery disease 09-Nov-2022 10:44:19  Akira Skelton  Peripheral artery disease 09-Nov-2022 10:44:26  Akira Skelton

## 2022-11-09 NOTE — ASU DISCHARGE PLAN (ADULT/PEDIATRIC) - PAIN MANAGEMENT
ok to take tylenol and ibuprofen as needed for pain./Take over the counter pain medication Please do not take tylenol/acetaminophen with percocet./Take over the counter pain medication/Prescriptions electronically submitted to pharmacy from doctor's office

## 2022-11-09 NOTE — PATIENT PROFILE ADULT - NUMBER OF YRS
What Type Of Note Output Would You Prefer (Optional)?: Bullet Format How Severe Is Your Acne?: mild Is This A New Presentation, Or A Follow-Up?: Acne Additional Comments (Use Complete Sentences): NP 42

## 2022-11-09 NOTE — PATIENT PROFILE ADULT - FALL HARM RISK - HARM RISK INTERVENTIONS

## 2022-11-09 NOTE — ASU DISCHARGE PLAN (ADULT/PEDIATRIC) - CARE PROVIDER_API CALL
Florentino Lucas)  Surgery; Vascular Surgery  [address], [address]  [place] [address]  Phone: [phone]  Fax: [phone]  Follow Up Time: 1 week

## 2022-11-25 ENCOUNTER — OUTPATIENT (OUTPATIENT)
Dept: OUTPATIENT SERVICES | Facility: HOSPITAL | Age: 77
LOS: 1 days | End: 2022-11-25
Payer: MEDICARE

## 2022-11-25 VITALS
RESPIRATION RATE: 16 BRPM | TEMPERATURE: 98 F | WEIGHT: 143.08 LBS | DIASTOLIC BLOOD PRESSURE: 70 MMHG | HEART RATE: 86 BPM | OXYGEN SATURATION: 95 % | HEIGHT: 62 IN | SYSTOLIC BLOOD PRESSURE: 108 MMHG

## 2022-11-25 DIAGNOSIS — I73.9 PERIPHERAL VASCULAR DISEASE, UNSPECIFIED: Chronic | ICD-10-CM

## 2022-11-25 DIAGNOSIS — S72.009A FRACTURE OF UNSPECIFIED PART OF NECK OF UNSPECIFIED FEMUR, INITIAL ENCOUNTER FOR CLOSED FRACTURE: Chronic | ICD-10-CM

## 2022-11-25 DIAGNOSIS — I25.10 ATHEROSCLEROTIC HEART DISEASE OF NATIVE CORONARY ARTERY WITHOUT ANGINA PECTORIS: ICD-10-CM

## 2022-11-25 DIAGNOSIS — Z01.818 ENCOUNTER FOR OTHER PREPROCEDURAL EXAMINATION: ICD-10-CM

## 2022-11-25 DIAGNOSIS — Z29.9 ENCOUNTER FOR PROPHYLACTIC MEASURES, UNSPECIFIED: ICD-10-CM

## 2022-11-25 DIAGNOSIS — I73.9 PERIPHERAL VASCULAR DISEASE, UNSPECIFIED: ICD-10-CM

## 2022-11-25 DIAGNOSIS — J44.9 CHRONIC OBSTRUCTIVE PULMONARY DISEASE, UNSPECIFIED: ICD-10-CM

## 2022-11-25 DIAGNOSIS — Z95.1 PRESENCE OF AORTOCORONARY BYPASS GRAFT: Chronic | ICD-10-CM

## 2022-11-25 DIAGNOSIS — Z98.890 OTHER SPECIFIED POSTPROCEDURAL STATES: Chronic | ICD-10-CM

## 2022-11-25 DIAGNOSIS — I70.223 ATHEROSCLEROSIS OF NATIVE ARTERIES OF EXTREMITIES WITH REST PAIN, BILATERAL LEGS: ICD-10-CM

## 2022-11-25 LAB
ANION GAP SERPL CALC-SCNC: 12 MMOL/L — SIGNIFICANT CHANGE UP (ref 5–17)
BLD GP AB SCN SERPL QL: NEGATIVE — SIGNIFICANT CHANGE UP
BUN SERPL-MCNC: 12 MG/DL — SIGNIFICANT CHANGE UP (ref 7–23)
CALCIUM SERPL-MCNC: 9.3 MG/DL — SIGNIFICANT CHANGE UP (ref 8.4–10.5)
CHLORIDE SERPL-SCNC: 107 MMOL/L — SIGNIFICANT CHANGE UP (ref 96–108)
CO2 SERPL-SCNC: 20 MMOL/L — LOW (ref 22–31)
CREAT SERPL-MCNC: 1.09 MG/DL — SIGNIFICANT CHANGE UP (ref 0.5–1.3)
EGFR: 53 ML/MIN/1.73M2 — LOW
GLUCOSE SERPL-MCNC: 91 MG/DL — SIGNIFICANT CHANGE UP (ref 70–99)
HCT VFR BLD CALC: 37 % — SIGNIFICANT CHANGE UP (ref 34.5–45)
HGB BLD-MCNC: 11.5 G/DL — SIGNIFICANT CHANGE UP (ref 11.5–15.5)
MCHC RBC-ENTMCNC: 29.4 PG — SIGNIFICANT CHANGE UP (ref 27–34)
MCHC RBC-ENTMCNC: 31.1 GM/DL — LOW (ref 32–36)
MCV RBC AUTO: 94.6 FL — SIGNIFICANT CHANGE UP (ref 80–100)
NRBC # BLD: 0 /100 WBCS — SIGNIFICANT CHANGE UP (ref 0–0)
PLATELET # BLD AUTO: 250 K/UL — SIGNIFICANT CHANGE UP (ref 150–400)
POTASSIUM SERPL-MCNC: 3.8 MMOL/L — SIGNIFICANT CHANGE UP (ref 3.5–5.3)
POTASSIUM SERPL-SCNC: 3.8 MMOL/L — SIGNIFICANT CHANGE UP (ref 3.5–5.3)
RBC # BLD: 3.91 M/UL — SIGNIFICANT CHANGE UP (ref 3.8–5.2)
RBC # FLD: 14.1 % — SIGNIFICANT CHANGE UP (ref 10.3–14.5)
RH IG SCN BLD-IMP: POSITIVE — SIGNIFICANT CHANGE UP
SODIUM SERPL-SCNC: 139 MMOL/L — SIGNIFICANT CHANGE UP (ref 135–145)
WBC # BLD: 8.93 K/UL — SIGNIFICANT CHANGE UP (ref 3.8–10.5)
WBC # FLD AUTO: 8.93 K/UL — SIGNIFICANT CHANGE UP (ref 3.8–10.5)

## 2022-11-25 PROCEDURE — 85027 COMPLETE CBC AUTOMATED: CPT

## 2022-11-25 PROCEDURE — 86900 BLOOD TYPING SEROLOGIC ABO: CPT

## 2022-11-25 PROCEDURE — 36415 COLL VENOUS BLD VENIPUNCTURE: CPT

## 2022-11-25 PROCEDURE — 86850 RBC ANTIBODY SCREEN: CPT

## 2022-11-25 PROCEDURE — 86901 BLOOD TYPING SEROLOGIC RH(D): CPT

## 2022-11-25 PROCEDURE — 80048 BASIC METABOLIC PNL TOTAL CA: CPT

## 2022-11-25 NOTE — H&P PST ADULT - NEGATIVE RESPIRATORY AND THORAX SYMPTOMS
EMR entered to perform chart review to provide data related to performance improvement
no wheezing/no dyspnea/no cough

## 2022-11-25 NOTE — H&P PST ADULT - RESPIRATORY
clear to auscultation bilaterally/no wheezes/no rales clear to auscultation bilaterally/no wheezes/no rales/airway patent/good air movement/respirations non-labored

## 2022-11-25 NOTE — H&P PST ADULT - NSICDXPASTMEDICALHX_GEN_ALL_CORE_FT
PAST MEDICAL HISTORY:  CAD (coronary artery disease)     COPD (chronic obstructive pulmonary disease) denies any recent exacerbation    GERD (gastroesophageal reflux disease)     HLD (hyperlipidemia)     HTN (hypertension)     PVD (peripheral vascular disease)     Right carotid bruit     Tobacco use Quit June 2021     PAST MEDICAL HISTORY:  CAD (coronary artery disease)     COPD (chronic obstructive pulmonary disease) denies any recent exacerbation    GERD (gastroesophageal reflux disease)     History of carotid artery stenosis     History of palpitations     History of prediabetes last A1c 6.0 on 9/2022    HLD (hyperlipidemia)     HTN (hypertension)     PAD (peripheral artery disease)     PVD (peripheral vascular disease)     Right carotid bruit     Tobacco use Quit June 2021

## 2022-11-25 NOTE — H&P PST ADULT - CARDIOVASCULAR SYMPTOMS
leg pain with rest/dyspnea on exertion/peripheral edema/claudication bilateral leg pain with rest/dyspnea on exertion/peripheral edema/claudication

## 2022-11-25 NOTE — H&P PST ADULT - HISTORY OF PRESENT ILLNESS
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 Denies any palpitations, SOB, N/V, fever, Covid symptoms or chills.     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 Procedure aborted on 11/9/2022 now planned for right Axillary BI femoral bypass on 11/30/2022     **Covid test scheduled for 11/27/22 at [facility]   Denies any recent covid infection or exposure

## 2022-11-25 NOTE — H&P PST ADULT - LAST ECHOCARDIOGRAM
8/2022 8/2022 EF 50%, mildly reduced LV systolic function, left LV diastolic dysfunction, Aortic sclerosis, small pericardial effusion without evidence of Tamponade

## 2022-11-25 NOTE — H&P PST ADULT - PROBLEM SELECTOR PROBLEM 1
Atherosclerosis of native artery of both lower extremities with rest pain PAD (peripheral artery disease)

## 2022-11-25 NOTE — H&P PST ADULT - NSICDXPASTSURGICALHX_GEN_ALL_CORE_FT
PAST SURGICAL HISTORY:  Femoral neck fracture September 2021    H/O carotid endarterectomy left, September 2022    PAD (peripheral artery disease) Left femoral endarterectomy 11/2022 @ [facility]    S/P CABG x 2 2021     PAST SURGICAL HISTORY:  Femoral neck fracture September 2021    H/O carotid endarterectomy left, September 2022    S/P CABG x 2 2021

## 2022-11-25 NOTE — H&P PST ADULT - ATTENDING COMMENTS
She has severe PAD with rest pain and occluded bilateral iliac arteries. The iliac occlusions could not be crossed and stented. She would be at increased risk for an aorta-bifemoral bypass due to frailty. She thus presents for an axillary-bifemoral bypass. She has an innominate artery stenosis and therefore the bypass will be done from the left side. The procedure, risks and alternatives were explained and informed consent was obtained.

## 2022-11-25 NOTE — H&P PST ADULT - HISTORY OF COVID-19 VACCINATION
Pt states she is about 6 weeks pregnant and has had a HA for 24 hours. Tylenol is not helping. Pt states she used to get migraines. She is asking how much Tylenol she can take. Advised her to try 2 extra strength tylenol and a little caffeine.  Pt states sh Yes

## 2022-11-25 NOTE — H&P PST ADULT - CARDIOVASCULAR
regular rate and rhythm/S1 S2 present/peripheral edema/pedal edema details… regular rate and rhythm/S1 S2 present/murmur/peripheral edema/pedal edema

## 2022-11-25 NOTE — H&P PST ADULT - NEUROLOGICAL
sensation intact/responds to pain/no spontaneous movement negative sensation intact/responds to pain/responds to verbal commands/no spontaneous movement

## 2022-11-25 NOTE — H&P PST ADULT - PROBLEM SELECTOR PLAN 2
The Caprini score indicates that this patient is at high risk for a VTE event (score 6 or greater). Surgical patients in this group will benefit from both pharmacologic prophylaxis and intermittent compression devices.  The surgical team will determine the balance between VTE risk and bleeding risk, and other clinical considerations continue aspirin am of sx  last EKG, echo, cardiac eval 11/2/2022 on file   Diltiazem of sx

## 2022-11-25 NOTE — H&P PST ADULT - ASSESSMENT
LÓPEZI VTE 2.0 SCORE [CLOT updated 2019]    AGE RELATED RISK FACTORS                                                       MOBILITY RELATED FACTORS  [ ] Age 41-60 years                                            (1 Point)                    [ ] Bed rest                                                        (1 Point)  [ ] Age: 61-74 years                                           (2 Points)                  [ ] Plaster cast                                                   (2 Points)  [3] Age= 75 years                                              (3 Points)                    [ ] Bed bound for more than 72 hours                 (2 Points)    DISEASE RELATED RISK FACTORS                                               GENDER SPECIFIC FACTORS  [1 ] Edema in the lower extremities                       (1 Point)              [ ] Pregnancy                                                     (1 Point)  [ ] Varicose veins                                               (1 Point)                     [ ] Post-partum < 6 weeks                                   (1 Point)             [ ] BMI > 25 Kg/m2                                            (1 Point)                     [ ] Hormonal therapy  or oral contraception          (1 Point)                 [ ] Sepsis (in the previous month)                        (1 Point)               [ ] History of pregnancy complications                 (1 point)  [ ] Pneumonia or serious lung disease                                               [ ] Unexplained or recurrent                     (1 Point)           (in the previous month)                               (1 Point)  [ ] Abnormal pulmonary function test                     (1 Point)                 SURGERY RELATED RISK FACTORS  [ ] Acute myocardial infarction                              (1 Point)               [ ]  Section                                             (1 Point)  [ ] Congestive heart failure (in the previous month)  (1 Point)      [ ] Minor surgery                                                  (1 Point)   [ ] Inflammatory bowel disease                             (1 Point)               [ ] Arthroscopic surgery                                        (2 Points)  [ ] Central venous access                                      (2 Points)                [2 ] General surgery lasting more than 45 minutes (2 points)  [ ] Malignancy- Present or previous                   (2 Points)                [ ] Elective arthroplasty                                         (5 points)    [ ] Stroke (in the previous month)                          (5 Points)                                                                                                                                                           HEMATOLOGY RELATED FACTORS                                                 TRAUMA RELATED RISK FACTORS  [ ] Prior episodes of VTE                                     (3 Points)                [ ] Fracture of the hip, pelvis, or leg                       (5 Points)  [ ] Positive family history for VTE                         (3 Points)             [ ] Acute spinal cord injury (in the previous month)  (5 Points)  [ ] Prothrombin 83820 A                                     (3 Points)               [ ] Paralysis  (less than 1 month)                             (5 Points)  [ ] Factor V Leiden                                             (3 Points)                  [ ] Multiple Trauma within 1 month                        (5 Points)  [ ] Lupus anticoagulants                                     (3 Points)                                                           [ ] Anticardiolipin antibodies                               (3 Points)                                                       [ ] High homocysteine in the blood                      (3 Points)                                             [ ] Other congenital or acquired thrombophilia      (3 Points)                                                [ ] Heparin induced thrombocytopenia                  (3 Points)                                     Total Score [  6        ]

## 2022-11-25 NOTE — H&P PST ADULT - FALL HARM RISK - HARM RISK INTERVENTIONS

## 2022-11-25 NOTE — H&P PST ADULT - PROBLEM SELECTOR PLAN 1
LEFT FEMORAL ENDARTERECTOMY   LEFT ILIAC STENT  AORTOGRAM  POSSIBLE RIGHT ILIAC STENT   POSSIBLE FEMORAL FEMORAL BYPASS     CBC, T&S, BMP in PST  Pre-op education provided - all questions answered   Chlorhex soap & instructions given  To continue on Asprin until day of surgery planned for right Axillary BI femoral bypass on 11/30/2022   PST labs send  Preprocedure surgical scrub instructions discussed   continue home meds

## 2022-11-27 ENCOUNTER — OUTPATIENT (OUTPATIENT)
Dept: OUTPATIENT SERVICES | Facility: HOSPITAL | Age: 77
LOS: 1 days | End: 2022-11-27

## 2022-11-27 DIAGNOSIS — Z98.890 OTHER SPECIFIED POSTPROCEDURAL STATES: Chronic | ICD-10-CM

## 2022-11-27 DIAGNOSIS — S72.009A FRACTURE OF UNSPECIFIED PART OF NECK OF UNSPECIFIED FEMUR, INITIAL ENCOUNTER FOR CLOSED FRACTURE: Chronic | ICD-10-CM

## 2022-11-27 DIAGNOSIS — Z11.52 ENCOUNTER FOR SCREENING FOR COVID-19: ICD-10-CM

## 2022-11-27 DIAGNOSIS — Z95.1 PRESENCE OF AORTOCORONARY BYPASS GRAFT: Chronic | ICD-10-CM

## 2022-11-27 LAB — SARS-COV-2 RNA SPEC QL NAA+PROBE: SIGNIFICANT CHANGE UP

## 2022-11-29 ENCOUNTER — TRANSCRIPTION ENCOUNTER (OUTPATIENT)
Age: 77
End: 2022-11-29

## 2022-11-30 ENCOUNTER — RESULT REVIEW (OUTPATIENT)
Age: 77
End: 2022-11-30

## 2022-11-30 ENCOUNTER — INPATIENT (INPATIENT)
Facility: HOSPITAL | Age: 77
LOS: 5 days | Discharge: NURSING, REHAB OR LONG-TERM CARE | DRG: 253 | End: 2022-12-06
Attending: SURGERY | Admitting: SURGERY
Payer: MEDICARE

## 2022-11-30 VITALS
HEIGHT: 62 IN | HEART RATE: 81 BPM | SYSTOLIC BLOOD PRESSURE: 164 MMHG | RESPIRATION RATE: 18 BRPM | OXYGEN SATURATION: 98 % | DIASTOLIC BLOOD PRESSURE: 82 MMHG | WEIGHT: 143.08 LBS | TEMPERATURE: 98 F

## 2022-11-30 DIAGNOSIS — Z98.890 OTHER SPECIFIED POSTPROCEDURAL STATES: Chronic | ICD-10-CM

## 2022-11-30 DIAGNOSIS — Z95.1 PRESENCE OF AORTOCORONARY BYPASS GRAFT: Chronic | ICD-10-CM

## 2022-11-30 DIAGNOSIS — I70.223 ATHEROSCLEROSIS OF NATIVE ARTERIES OF EXTREMITIES WITH REST PAIN, BILATERAL LEGS: ICD-10-CM

## 2022-11-30 DIAGNOSIS — S72.009A FRACTURE OF UNSPECIFIED PART OF NECK OF UNSPECIFIED FEMUR, INITIAL ENCOUNTER FOR CLOSED FRACTURE: Chronic | ICD-10-CM

## 2022-11-30 LAB
ANION GAP SERPL CALC-SCNC: 13 MMOL/L — SIGNIFICANT CHANGE UP (ref 5–17)
BLD GP AB SCN SERPL QL: NEGATIVE — SIGNIFICANT CHANGE UP
BUN SERPL-MCNC: 12 MG/DL — SIGNIFICANT CHANGE UP (ref 7–23)
CALCIUM SERPL-MCNC: 7.4 MG/DL — LOW (ref 8.4–10.5)
CHLORIDE SERPL-SCNC: 107 MMOL/L — SIGNIFICANT CHANGE UP (ref 96–108)
CO2 SERPL-SCNC: 18 MMOL/L — LOW (ref 22–31)
CREAT SERPL-MCNC: 0.93 MG/DL — SIGNIFICANT CHANGE UP (ref 0.5–1.3)
EGFR: 64 ML/MIN/1.73M2 — SIGNIFICANT CHANGE UP
GAS PNL BLDA: SIGNIFICANT CHANGE UP
GLUCOSE BLDC GLUCOMTR-MCNC: 98 MG/DL — SIGNIFICANT CHANGE UP (ref 70–99)
GLUCOSE SERPL-MCNC: 189 MG/DL — HIGH (ref 70–99)
HCT VFR BLD CALC: 27.6 % — LOW (ref 34.5–45)
HCT VFR BLD CALC: 29 % — LOW (ref 34.5–45)
HGB BLD-MCNC: 8.9 G/DL — LOW (ref 11.5–15.5)
HGB BLD-MCNC: 9.4 G/DL — LOW (ref 11.5–15.5)
MCHC RBC-ENTMCNC: 30 PG — SIGNIFICANT CHANGE UP (ref 27–34)
MCHC RBC-ENTMCNC: 30.3 PG — SIGNIFICANT CHANGE UP (ref 27–34)
MCHC RBC-ENTMCNC: 32.2 GM/DL — SIGNIFICANT CHANGE UP (ref 32–36)
MCHC RBC-ENTMCNC: 32.4 GM/DL — SIGNIFICANT CHANGE UP (ref 32–36)
MCV RBC AUTO: 92.9 FL — SIGNIFICANT CHANGE UP (ref 80–100)
MCV RBC AUTO: 93.5 FL — SIGNIFICANT CHANGE UP (ref 80–100)
NRBC # BLD: 0 /100 WBCS — SIGNIFICANT CHANGE UP (ref 0–0)
NRBC # BLD: 0 /100 WBCS — SIGNIFICANT CHANGE UP (ref 0–0)
PLATELET # BLD AUTO: 150 K/UL — SIGNIFICANT CHANGE UP (ref 150–400)
PLATELET # BLD AUTO: 163 K/UL — SIGNIFICANT CHANGE UP (ref 150–400)
POTASSIUM SERPL-MCNC: 3.6 MMOL/L — SIGNIFICANT CHANGE UP (ref 3.5–5.3)
POTASSIUM SERPL-SCNC: 3.6 MMOL/L — SIGNIFICANT CHANGE UP (ref 3.5–5.3)
RBC # BLD: 2.97 M/UL — LOW (ref 3.8–5.2)
RBC # BLD: 3.1 M/UL — LOW (ref 3.8–5.2)
RBC # FLD: 13.7 % — SIGNIFICANT CHANGE UP (ref 10.3–14.5)
RBC # FLD: 14 % — SIGNIFICANT CHANGE UP (ref 10.3–14.5)
RH IG SCN BLD-IMP: POSITIVE — SIGNIFICANT CHANGE UP
SODIUM SERPL-SCNC: 138 MMOL/L — SIGNIFICANT CHANGE UP (ref 135–145)
WBC # BLD: 13.99 K/UL — HIGH (ref 3.8–10.5)
WBC # BLD: 17 K/UL — HIGH (ref 3.8–10.5)
WBC # FLD AUTO: 13.99 K/UL — HIGH (ref 3.8–10.5)
WBC # FLD AUTO: 17 K/UL — HIGH (ref 3.8–10.5)

## 2022-11-30 PROCEDURE — 88311 DECALCIFY TISSUE: CPT | Mod: 26

## 2022-11-30 PROCEDURE — 88304 TISSUE EXAM BY PATHOLOGIST: CPT | Mod: 26

## 2022-11-30 DEVICE — KIT A-LINE 1LUM 20G X 12CM SAFE KIT: Type: IMPLANTABLE DEVICE | Site: LEFT | Status: FUNCTIONAL

## 2022-11-30 DEVICE — CLIP APPLIER COVIDIEN SURGICLIP III 9" SM: Type: IMPLANTABLE DEVICE | Site: LEFT | Status: FUNCTIONAL

## 2022-11-30 DEVICE — CLIP APPLIER COVIDIEN SURGICLIP 11.5" MEDIUM: Type: IMPLANTABLE DEVICE | Site: LEFT | Status: FUNCTIONAL

## 2022-11-30 DEVICE — SURGIFOAM PAD 8CM X 12.5CM X 10MM (100): Type: IMPLANTABLE DEVICE | Site: LEFT | Status: FUNCTIONAL

## 2022-11-30 DEVICE — ARISTA 3GR: Type: IMPLANTABLE DEVICE | Site: LEFT | Status: FUNCTIONAL

## 2022-11-30 DEVICE — IMPLANTABLE DEVICE: Type: IMPLANTABLE DEVICE | Site: LEFT | Status: FUNCTIONAL

## 2022-11-30 DEVICE — SURGICEL POWDER 3 GRAMS: Type: IMPLANTABLE DEVICE | Site: LEFT | Status: FUNCTIONAL

## 2022-11-30 RX ORDER — HEPARIN SODIUM 5000 [USP'U]/ML
5000 INJECTION INTRAVENOUS; SUBCUTANEOUS EVERY 8 HOURS
Refills: 0 | Status: DISCONTINUED | OUTPATIENT
Start: 2022-11-30 | End: 2022-12-06

## 2022-11-30 RX ORDER — CEFAZOLIN SODIUM 1 G
2000 VIAL (EA) INJECTION EVERY 8 HOURS
Refills: 0 | Status: COMPLETED | OUTPATIENT
Start: 2022-11-30 | End: 2022-12-01

## 2022-11-30 RX ORDER — LIDOCAINE HCL 20 MG/ML
0.2 VIAL (ML) INJECTION ONCE
Refills: 0 | Status: DISCONTINUED | OUTPATIENT
Start: 2022-11-30 | End: 2022-11-30

## 2022-11-30 RX ORDER — LOPERAMIDE HCL 2 MG
1 TABLET ORAL
Qty: 0 | Refills: 0 | DISCHARGE

## 2022-11-30 RX ORDER — ASPIRIN/CALCIUM CARB/MAGNESIUM 324 MG
1 TABLET ORAL
Qty: 0 | Refills: 0 | DISCHARGE

## 2022-11-30 RX ORDER — ALBUMIN HUMAN 25 %
100 VIAL (ML) INTRAVENOUS ONCE
Refills: 0 | Status: COMPLETED | OUTPATIENT
Start: 2022-11-30 | End: 2022-11-30

## 2022-11-30 RX ORDER — SODIUM CHLORIDE 9 MG/ML
3 INJECTION INTRAMUSCULAR; INTRAVENOUS; SUBCUTANEOUS EVERY 8 HOURS
Refills: 0 | Status: DISCONTINUED | OUTPATIENT
Start: 2022-11-30 | End: 2022-11-30

## 2022-11-30 RX ORDER — CEFAZOLIN SODIUM 1 G
2000 VIAL (EA) INJECTION ONCE
Refills: 0 | Status: COMPLETED | OUTPATIENT
Start: 2022-11-30 | End: 2022-11-30

## 2022-11-30 RX ORDER — DIPHENHYDRAMINE HCL 50 MG
12.5 CAPSULE ORAL ONCE
Refills: 0 | Status: COMPLETED | OUTPATIENT
Start: 2022-11-30 | End: 2022-11-30

## 2022-11-30 RX ORDER — GABAPENTIN 400 MG/1
1 CAPSULE ORAL
Qty: 0 | Refills: 0 | DISCHARGE

## 2022-11-30 RX ORDER — FAMOTIDINE 10 MG/ML
1 INJECTION INTRAVENOUS
Qty: 0 | Refills: 0 | DISCHARGE

## 2022-11-30 RX ORDER — FLUTICASONE FUROATE, UMECLIDINIUM BROMIDE AND VILANTEROL TRIFENATATE 200; 62.5; 25 UG/1; UG/1; UG/1
1 POWDER RESPIRATORY (INHALATION)
Qty: 0 | Refills: 0 | DISCHARGE

## 2022-11-30 RX ORDER — CEFAZOLIN SODIUM 1 G
VIAL (EA) INJECTION
Refills: 0 | Status: COMPLETED | OUTPATIENT
Start: 2022-11-30 | End: 2022-12-01

## 2022-11-30 RX ORDER — SODIUM CHLORIDE 9 MG/ML
1000 INJECTION, SOLUTION INTRAVENOUS
Refills: 0 | Status: DISCONTINUED | OUTPATIENT
Start: 2022-11-30 | End: 2022-11-30

## 2022-11-30 RX ORDER — HYDROMORPHONE HYDROCHLORIDE 2 MG/ML
0.25 INJECTION INTRAMUSCULAR; INTRAVENOUS; SUBCUTANEOUS
Refills: 0 | Status: DISCONTINUED | OUTPATIENT
Start: 2022-11-30 | End: 2022-11-30

## 2022-11-30 RX ORDER — OXYCODONE HYDROCHLORIDE 5 MG/1
10 TABLET ORAL EVERY 4 HOURS
Refills: 0 | Status: DISCONTINUED | OUTPATIENT
Start: 2022-11-30 | End: 2022-12-06

## 2022-11-30 RX ORDER — INFLUENZA VIRUS VACCINE 15; 15; 15; 15 UG/.5ML; UG/.5ML; UG/.5ML; UG/.5ML
0.7 SUSPENSION INTRAMUSCULAR ONCE
Refills: 0 | Status: DISCONTINUED | OUTPATIENT
Start: 2022-11-30 | End: 2022-12-06

## 2022-11-30 RX ORDER — ATORVASTATIN CALCIUM 80 MG/1
40 TABLET, FILM COATED ORAL AT BEDTIME
Refills: 0 | Status: DISCONTINUED | OUTPATIENT
Start: 2022-11-30 | End: 2022-12-06

## 2022-11-30 RX ORDER — ACETAMINOPHEN 500 MG
650 TABLET ORAL EVERY 6 HOURS
Refills: 0 | Status: DISCONTINUED | OUTPATIENT
Start: 2022-11-30 | End: 2022-12-02

## 2022-11-30 RX ORDER — OXYCODONE HYDROCHLORIDE 5 MG/1
5 TABLET ORAL EVERY 4 HOURS
Refills: 0 | Status: DISCONTINUED | OUTPATIENT
Start: 2022-11-30 | End: 2022-12-06

## 2022-11-30 RX ORDER — CALCIUM GLUCONATE 100 MG/ML
1 VIAL (ML) INTRAVENOUS ONCE
Refills: 0 | Status: COMPLETED | OUTPATIENT
Start: 2022-11-30 | End: 2022-11-30

## 2022-11-30 RX ORDER — DILTIAZEM HCL 120 MG
30 CAPSULE, EXT RELEASE 24 HR ORAL
Refills: 0 | Status: DISCONTINUED | OUTPATIENT
Start: 2022-11-30 | End: 2022-12-02

## 2022-11-30 RX ORDER — ASPIRIN/CALCIUM CARB/MAGNESIUM 324 MG
81 TABLET ORAL DAILY
Refills: 0 | Status: DISCONTINUED | OUTPATIENT
Start: 2022-11-30 | End: 2022-12-06

## 2022-11-30 RX ORDER — ONDANSETRON 8 MG/1
4 TABLET, FILM COATED ORAL ONCE
Refills: 0 | Status: COMPLETED | OUTPATIENT
Start: 2022-11-30 | End: 2022-11-30

## 2022-11-30 RX ORDER — HYDROMORPHONE HYDROCHLORIDE 2 MG/ML
0.5 INJECTION INTRAMUSCULAR; INTRAVENOUS; SUBCUTANEOUS ONCE
Refills: 0 | Status: DISCONTINUED | OUTPATIENT
Start: 2022-11-30 | End: 2022-11-30

## 2022-11-30 RX ORDER — TAMSULOSIN HYDROCHLORIDE 0.4 MG/1
1 CAPSULE ORAL
Qty: 0 | Refills: 0 | DISCHARGE

## 2022-11-30 RX ADMIN — OXYCODONE HYDROCHLORIDE 5 MILLIGRAM(S): 5 TABLET ORAL at 22:15

## 2022-11-30 RX ADMIN — Medication 100 GRAM(S): at 14:29

## 2022-11-30 RX ADMIN — Medication 650 MILLIGRAM(S): at 20:43

## 2022-11-30 RX ADMIN — Medication 650 MILLIGRAM(S): at 21:15

## 2022-11-30 RX ADMIN — SODIUM CHLORIDE 100 MILLILITER(S): 9 INJECTION, SOLUTION INTRAVENOUS at 17:12

## 2022-11-30 RX ADMIN — Medication 30 MILLIGRAM(S): at 20:43

## 2022-11-30 RX ADMIN — Medication 100 MILLIGRAM(S): at 17:12

## 2022-11-30 RX ADMIN — HYDROMORPHONE HYDROCHLORIDE 0.25 MILLIGRAM(S): 2 INJECTION INTRAMUSCULAR; INTRAVENOUS; SUBCUTANEOUS at 14:17

## 2022-11-30 RX ADMIN — HYDROMORPHONE HYDROCHLORIDE 0.5 MILLIGRAM(S): 2 INJECTION INTRAMUSCULAR; INTRAVENOUS; SUBCUTANEOUS at 23:37

## 2022-11-30 RX ADMIN — OXYCODONE HYDROCHLORIDE 5 MILLIGRAM(S): 5 TABLET ORAL at 21:41

## 2022-11-30 RX ADMIN — Medication 12.5 MILLIGRAM(S): at 14:40

## 2022-11-30 RX ADMIN — ATORVASTATIN CALCIUM 40 MILLIGRAM(S): 80 TABLET, FILM COATED ORAL at 21:23

## 2022-11-30 RX ADMIN — Medication 50 MILLILITER(S): at 14:07

## 2022-11-30 RX ADMIN — ONDANSETRON 4 MILLIGRAM(S): 8 TABLET, FILM COATED ORAL at 14:07

## 2022-11-30 RX ADMIN — HYDROMORPHONE HYDROCHLORIDE 0.25 MILLIGRAM(S): 2 INJECTION INTRAMUSCULAR; INTRAVENOUS; SUBCUTANEOUS at 19:20

## 2022-11-30 RX ADMIN — SODIUM CHLORIDE 100 MILLILITER(S): 9 INJECTION, SOLUTION INTRAVENOUS at 19:22

## 2022-11-30 RX ADMIN — HYDROMORPHONE HYDROCHLORIDE 0.25 MILLIGRAM(S): 2 INJECTION INTRAMUSCULAR; INTRAVENOUS; SUBCUTANEOUS at 19:50

## 2022-11-30 RX ADMIN — HYDROMORPHONE HYDROCHLORIDE 0.25 MILLIGRAM(S): 2 INJECTION INTRAMUSCULAR; INTRAVENOUS; SUBCUTANEOUS at 14:45

## 2022-11-30 NOTE — PATIENT PROFILE ADULT - NSPROPTRIGHTSUPPORTPERSON_GEN_A_NUR
No Repair - Repaired With Adjacent Surgical Defect Text (Leave Blank If You Do Not Want): After the excision the defect was repaired concurrently with another surgical defect which was in close approximation. declines

## 2022-11-30 NOTE — CHART NOTE - NSCHARTNOTEFT_GEN_A_CORE
Post Operative Check    Patient is post op from a Endarterectomy of both common femoral arteries and axillary to bifemoral bypass with graft, recovering appropriately. Patient reports left-sided pain near incision, as well b/l groin pain. Denies headache, vomiting, SOB, chest pain. Patient's nausea has gotten better.    Vitals    T(C): 36.3 (11-30-22 @ 12:45), Max: 36.4 (11-30-22 @ 06:36)  HR: 64 (11-30-22 @ 16:30) (56 - 91)  BP: 167/69 (11-30-22 @ 16:30) (83/40 - 167/69)  RR: 16 (11-30-22 @ 16:30) (15 - 18)  SpO2: 100% (11-30-22 @ 16:30) (93% - 100%)      11-30 @ 07:01  -  11-30 @ 17:21  --------------------------------------------------------  IN:    Albumin 25%  -  50 mL: 100 mL    IV PiggyBack: 50 mL  Total IN: 150 mL    OUT:    Indwelling Catheter - Urethral (mL): 175 mL  Total OUT: 175 mL    Total NET: -25 mL          Labs                        9.4    17.00 )-----------( 163      ( 30 Nov 2022 13:43 )             29.0       CBC Full  -  ( 30 Nov 2022 13:43 )  WBC Count : 17.00 K/uL  Hemoglobin : 9.4 g/dL  Hematocrit : 29.0 %  Platelet Count - Automated : 163 K/uL  Mean Cell Volume : 93.5 fl  Mean Cell Hemoglobin : 30.3 pg  Mean Cell Hemoglobin Concentration : 32.4 gm/dL  Auto Neutrophil # : x  Auto Lymphocyte # : x  Auto Monocyte # : x  Auto Eosinophil # : x  Auto Basophil # : x  Auto Neutrophil % : x  Auto Lymphocyte % : x  Auto Monocyte % : x  Auto Eosinophil % : x  Auto Basophil % : x      Physical Exam  General: lying in bed  Chest: L axillary cutdown site with aquacel in place, strikethrough within drawn line, no active oozing  - small hematoma noted over lateral inferior breast with mild tenderness and firmness of inferior aspect  Extremities: b/l groin sites with aquacel c/d/i, soft, no ecchymosis or evidence of hematoma  - R palpable DP/PT, L dopplerable DP/PT      Patient is a 77y old Female s/p b/l femoral endarterectomies and left axillary bifemoral bypass with graft. Postop H/H 9.4/29 from 11.5/37.    PLAN:  - f/u stat repeat CBC  - monitor hematoma, borders marked  - 6hour watch in PACU  - ancef x24hrs  - pain control   - continue moore, I/Os  - OOB with assistance  - DVT ppx w/ SQH      Vascular Surgery  [phone]

## 2022-11-30 NOTE — PATIENT PROFILE ADULT - FALL HARM RISK - UNIVERSAL INTERVENTIONS
Bed in lowest position, wheels locked, appropriate side rails in place/Call bell, personal items and telephone in reach/Instruct patient to call for assistance before getting out of bed or chair/Non-slip footwear when patient is out of bed/[place] to call system/Physically safe environment - no spills, clutter or unnecessary equipment/Purposeful Proactive Rounding/Room/bathroom lighting operational, light cord in reach

## 2022-11-30 NOTE — PRE-ANESTHESIA EVALUATION ADULT - NSANTHPMHFT_GEN_ALL_CORE
77F HTN HLD COPD (no home O2) PVD CAD s/p CABG 7/2021 with LE pain scheduled for axillary to fem-fem bypass.

## 2022-11-30 NOTE — BRIEF OPERATIVE NOTE - NSICDXBRIEFPROCEDURE_GEN_ALL_CORE_FT
PROCEDURES:  Endarterectomy of both common femoral arteries 30-Nov-2022 13:52:28  Nicho Petersen  Creation, bypass, axillary to bifemoral, using graft 30-Nov-2022 13:52:46  Nicho Petersen

## 2022-11-30 NOTE — PRE-ANESTHESIA EVALUATION ADULT - LAST ECHOCARDIOGRAM
8/2022 EF 50%, mildly reduced LV systolic function, left LV diastolic dysfunction, Aortic sclerosis, small pericardial effusion without evidence of Tamponade

## 2022-12-01 LAB
ANION GAP SERPL CALC-SCNC: 12 MMOL/L — SIGNIFICANT CHANGE UP (ref 5–17)
BUN SERPL-MCNC: 14 MG/DL — SIGNIFICANT CHANGE UP (ref 7–23)
CALCIUM SERPL-MCNC: 8.1 MG/DL — LOW (ref 8.4–10.5)
CHLORIDE SERPL-SCNC: 103 MMOL/L — SIGNIFICANT CHANGE UP (ref 96–108)
CO2 SERPL-SCNC: 21 MMOL/L — LOW (ref 22–31)
CREAT SERPL-MCNC: 1.08 MG/DL — SIGNIFICANT CHANGE UP (ref 0.5–1.3)
EGFR: 54 ML/MIN/1.73M2 — LOW
GLUCOSE SERPL-MCNC: 123 MG/DL — HIGH (ref 70–99)
HCT VFR BLD CALC: 30 % — LOW (ref 34.5–45)
HGB BLD-MCNC: 9.5 G/DL — LOW (ref 11.5–15.5)
MAGNESIUM SERPL-MCNC: 2 MG/DL — SIGNIFICANT CHANGE UP (ref 1.6–2.6)
MCHC RBC-ENTMCNC: 30.3 PG — SIGNIFICANT CHANGE UP (ref 27–34)
MCHC RBC-ENTMCNC: 31.7 GM/DL — LOW (ref 32–36)
MCV RBC AUTO: 95.5 FL — SIGNIFICANT CHANGE UP (ref 80–100)
NRBC # BLD: 0 /100 WBCS — SIGNIFICANT CHANGE UP (ref 0–0)
PHOSPHATE SERPL-MCNC: 3.7 MG/DL — SIGNIFICANT CHANGE UP (ref 2.5–4.5)
PLATELET # BLD AUTO: 172 K/UL — SIGNIFICANT CHANGE UP (ref 150–400)
POTASSIUM SERPL-MCNC: 4 MMOL/L — SIGNIFICANT CHANGE UP (ref 3.5–5.3)
POTASSIUM SERPL-SCNC: 4 MMOL/L — SIGNIFICANT CHANGE UP (ref 3.5–5.3)
RBC # BLD: 3.14 M/UL — LOW (ref 3.8–5.2)
RBC # FLD: 14.3 % — SIGNIFICANT CHANGE UP (ref 10.3–14.5)
SODIUM SERPL-SCNC: 136 MMOL/L — SIGNIFICANT CHANGE UP (ref 135–145)
WBC # BLD: 13.3 K/UL — HIGH (ref 3.8–10.5)
WBC # FLD AUTO: 13.3 K/UL — HIGH (ref 3.8–10.5)

## 2022-12-01 RX ORDER — PHENAZOPYRIDINE HCL 100 MG
100 TABLET ORAL ONCE
Refills: 0 | Status: COMPLETED | OUTPATIENT
Start: 2022-12-01 | End: 2022-12-01

## 2022-12-01 RX ORDER — SODIUM CHLORIDE 9 MG/ML
1000 INJECTION INTRAMUSCULAR; INTRAVENOUS; SUBCUTANEOUS
Refills: 0 | Status: DISCONTINUED | OUTPATIENT
Start: 2022-12-01 | End: 2022-12-02

## 2022-12-01 RX ORDER — TIOTROPIUM BROMIDE 18 UG/1
2 CAPSULE ORAL; RESPIRATORY (INHALATION) DAILY
Refills: 0 | Status: DISCONTINUED | OUTPATIENT
Start: 2022-12-01 | End: 2022-12-06

## 2022-12-01 RX ORDER — BUDESONIDE AND FORMOTEROL FUMARATE DIHYDRATE 160; 4.5 UG/1; UG/1
2 AEROSOL RESPIRATORY (INHALATION)
Refills: 0 | Status: DISCONTINUED | OUTPATIENT
Start: 2022-12-01 | End: 2022-12-06

## 2022-12-01 RX ORDER — SODIUM CHLORIDE 9 MG/ML
500 INJECTION, SOLUTION INTRAVENOUS ONCE
Refills: 0 | Status: COMPLETED | OUTPATIENT
Start: 2022-12-01 | End: 2022-12-01

## 2022-12-01 RX ORDER — SODIUM CHLORIDE 9 MG/ML
500 INJECTION INTRAMUSCULAR; INTRAVENOUS; SUBCUTANEOUS ONCE
Refills: 0 | Status: COMPLETED | OUTPATIENT
Start: 2022-12-01 | End: 2022-12-01

## 2022-12-01 RX ADMIN — OXYCODONE HYDROCHLORIDE 10 MILLIGRAM(S): 5 TABLET ORAL at 08:48

## 2022-12-01 RX ADMIN — Medication 100 MILLIGRAM(S): at 18:20

## 2022-12-01 RX ADMIN — Medication 650 MILLIGRAM(S): at 21:52

## 2022-12-01 RX ADMIN — Medication 100 MILLIGRAM(S): at 13:18

## 2022-12-01 RX ADMIN — HEPARIN SODIUM 5000 UNIT(S): 5000 INJECTION INTRAVENOUS; SUBCUTANEOUS at 21:16

## 2022-12-01 RX ADMIN — SODIUM CHLORIDE 500 MILLILITER(S): 9 INJECTION INTRAMUSCULAR; INTRAVENOUS; SUBCUTANEOUS at 21:27

## 2022-12-01 RX ADMIN — Medication 30 MILLIGRAM(S): at 05:20

## 2022-12-01 RX ADMIN — Medication 650 MILLIGRAM(S): at 01:58

## 2022-12-01 RX ADMIN — Medication 650 MILLIGRAM(S): at 08:48

## 2022-12-01 RX ADMIN — Medication 650 MILLIGRAM(S): at 21:16

## 2022-12-01 RX ADMIN — Medication 30 MILLIGRAM(S): at 18:22

## 2022-12-01 RX ADMIN — HYDROMORPHONE HYDROCHLORIDE 0.5 MILLIGRAM(S): 2 INJECTION INTRAMUSCULAR; INTRAVENOUS; SUBCUTANEOUS at 00:07

## 2022-12-01 RX ADMIN — OXYCODONE HYDROCHLORIDE 10 MILLIGRAM(S): 5 TABLET ORAL at 13:16

## 2022-12-01 RX ADMIN — SODIUM CHLORIDE 75 MILLILITER(S): 9 INJECTION INTRAMUSCULAR; INTRAVENOUS; SUBCUTANEOUS at 13:22

## 2022-12-01 RX ADMIN — OXYCODONE HYDROCHLORIDE 10 MILLIGRAM(S): 5 TABLET ORAL at 02:05

## 2022-12-01 RX ADMIN — Medication 650 MILLIGRAM(S): at 14:00

## 2022-12-01 RX ADMIN — OXYCODONE HYDROCHLORIDE 10 MILLIGRAM(S): 5 TABLET ORAL at 09:40

## 2022-12-01 RX ADMIN — Medication 650 MILLIGRAM(S): at 09:40

## 2022-12-01 RX ADMIN — OXYCODONE HYDROCHLORIDE 10 MILLIGRAM(S): 5 TABLET ORAL at 14:00

## 2022-12-01 RX ADMIN — OXYCODONE HYDROCHLORIDE 10 MILLIGRAM(S): 5 TABLET ORAL at 22:07

## 2022-12-01 RX ADMIN — ATORVASTATIN CALCIUM 40 MILLIGRAM(S): 80 TABLET, FILM COATED ORAL at 21:16

## 2022-12-01 RX ADMIN — HEPARIN SODIUM 5000 UNIT(S): 5000 INJECTION INTRAVENOUS; SUBCUTANEOUS at 13:18

## 2022-12-01 RX ADMIN — BUDESONIDE AND FORMOTEROL FUMARATE DIHYDRATE 2 PUFF(S): 160; 4.5 AEROSOL RESPIRATORY (INHALATION) at 18:23

## 2022-12-01 RX ADMIN — TIOTROPIUM BROMIDE 2 PUFF(S): 18 CAPSULE ORAL; RESPIRATORY (INHALATION) at 13:10

## 2022-12-01 RX ADMIN — OXYCODONE HYDROCHLORIDE 10 MILLIGRAM(S): 5 TABLET ORAL at 22:51

## 2022-12-01 RX ADMIN — OXYCODONE HYDROCHLORIDE 10 MILLIGRAM(S): 5 TABLET ORAL at 02:35

## 2022-12-01 RX ADMIN — Medication 100 MILLIGRAM(S): at 01:58

## 2022-12-01 RX ADMIN — Medication 650 MILLIGRAM(S): at 13:11

## 2022-12-01 RX ADMIN — Medication 650 MILLIGRAM(S): at 02:28

## 2022-12-01 RX ADMIN — Medication 81 MILLIGRAM(S): at 13:10

## 2022-12-01 RX ADMIN — Medication 100 MILLIGRAM(S): at 08:49

## 2022-12-01 RX ADMIN — SODIUM CHLORIDE 500 MILLILITER(S): 9 INJECTION, SOLUTION INTRAVENOUS at 05:19

## 2022-12-01 RX ADMIN — HEPARIN SODIUM 5000 UNIT(S): 5000 INJECTION INTRAVENOUS; SUBCUTANEOUS at 05:20

## 2022-12-01 NOTE — CONSULT NOTE ADULT - SUBJECTIVE AND OBJECTIVE BOX
name Mati Alicia      : [birth year]  MRN:  [id number]        Time Session Began:        1630            Time Session Ended: 1700     Session Type: Individual Psychotherapy     Others Present: n/a     Intervention: Behavioral, Cognitive     Suicide/Homicide/Violence Ideation: No     If Yes, explain: n/a  Change in Medication(s) Reported: No  If Yes, explain: n/a     Patient/Family Education Provided: No  Patient/Family Displays Understanding: Yes     If No, explain: n/a     Chief complaint in patient's own words: \"I am ready to be done.\"     Progress Note containing chief complaint and symptoms/problems related to the complaint:     (Data/Action/Response/Plan)     D: Met with pt for individual psychotherapy. Pt stated that he is ready to be done with his drug court and treatment. Pt stated that he put it off so long that he has a lot to make up for, but will be happy to be \"off the grid\" again.  A:  Discussed requirements for drug court and aftercare plans.  R: Patient presented with a congruent affect. Patient was receptive to writer's feedback.   P: Rescheduled in 1 week.     Need for Community Resources Assessed: Yes     Resources Needed: No     If Yes, what resources: n/a     Primary Diagnosis: Opioid Use Disorder-Severe     Treatment Plan: Unchanged       Discharge Plan: Strategies Discussed to Maintain Gains     Estrellita Mills MS, LPC, SAC                 3/10/2020   CHIEF COMPLAINT:Patient is a 77y old  Female who presents with a chief complaint of     HISTORY OF PRESENT ILLNESS:    77 female known to me from office with history as below is now s/p  b/l femoral endarterectomies and left axillary bifemoral bypass with graft.   denies any chest pain, sob, palpitation, dizziness or syncope.      PAST MEDICAL & SURGICAL HISTORY:  HTN (hypertension)      Tobacco use  Quit June 2021      COPD (chronic obstructive pulmonary disease)  denies any recent exacerbation      PVD (peripheral vascular disease)      CAD (coronary artery disease)      Right carotid bruit      GERD (gastroesophageal reflux disease)      HLD (hyperlipidemia)      History of carotid artery stenosis      PAD (peripheral artery disease)      History of palpitations      History of prediabetes  last A1c 6.0 on 9/2022      S/P CABG x 2  2021      Femoral neck fracture  September 2021      H/O carotid endarterectomy  left, September 2022              MEDICATIONS:  aspirin  chewable 81 milliGRAM(s) Oral daily  diltiazem    Tablet 30 milliGRAM(s) Oral two times a day  heparin   Injectable 5000 Unit(s) SubCutaneous every 8 hours    ceFAZolin   IVPB 2000 milliGRAM(s) IV Intermittent every 8 hours  ceFAZolin   IVPB        budesonide  80 MICROgram(s)/formoterol 4.5 MICROgram(s) Inhaler 2 Puff(s) Inhalation two times a day  tiotropium 2.5 MICROgram(s) Inhaler 2 Puff(s) Inhalation daily    acetaminophen     Tablet .. 650 milliGRAM(s) Oral every 6 hours  oxyCODONE    IR 5 milliGRAM(s) Oral every 4 hours PRN  oxyCODONE    IR 10 milliGRAM(s) Oral every 4 hours PRN      atorvastatin 40 milliGRAM(s) Oral at bedtime    influenza  Vaccine (HIGH DOSE) 0.7 milliLiter(s) IntraMuscular once      FAMILY HISTORY:      Non-contributory    SOCIAL HISTORY:    No tobacco, drugs or etoh    Allergies    contrast media (gadolinium-based) (Rash; Short breath; Hives)  Crab (Rash)  latex (Rash)  sulfa drugs (Rash)    Intolerances    	    REVIEW OF SYSTEMS:  as above  The rest of the 14 points ROS reviewed and except above they are unremarkable.        PHYSICAL EXAM:  T(C): 36.7 (12-01-22 @ 06:23), Max: 37.1 (11-30-22 @ 23:00)  HR: 80 (12-01-22 @ 06:23) (54 - 106)  BP: 135/78 (12-01-22 @ 06:23) (83/40 - 179/74)  RR: 15 (12-01-22 @ 06:23) (14 - 16)  SpO2: 96% (12-01-22 @ 06:23) (93% - 100%)  Wt(kg): --  I&O's Summary    30 Nov 2022 07:01  -  01 Dec 2022 07:00  --------------------------------------------------------  IN: 1390 mL / OUT: 590 mL / NET: 800 mL      JVP: Normal  Neck: supple  Lung: clear   CV: S1 S2 , Murmur:  Abd: soft  Ext: No edema  neuro: Awake / alert  Psych: flat affect  Skin: normal    LABS/DATA:    TELEMETRY: 	    ECG:  	   	  CARDIAC MARKERS:                              9.5    13.30 )-----------( 172      ( 01 Dec 2022 07:01 )             30.0     12-01    136  |  103  |  14  ----------------------------<  123<H>  4.0   |  21<L>  |  1.08    Ca    8.1<L>      01 Dec 2022 07:01  Phos  3.7     12-01  Mg     2.0     12-01      proBNP:   Lipid Profile:   HgA1c:   TSH:

## 2022-12-01 NOTE — PHYSICAL THERAPY INITIAL EVALUATION ADULT - ACTIVE RANGE OF MOTION EXAMINATION, REHAB EVAL
b/l hips, knee within functional limits 2/2 to pain/lesley. upper extremity Active ROM was WNL (within normal limits)/deficits as listed below

## 2022-12-01 NOTE — PHYSICAL THERAPY INITIAL EVALUATION ADULT - GENERAL OBSERVATIONS, REHAB EVAL
Pt received semi-supine in bed in NAD, +IV, + O2 1L NC, B/L calf compression, +Levy, BG=98. Hematoma/bruising under/behind L breast., bruising on L hip. Pt AOx4, pleasant and cooperative. Pt received semi-supine in bed in NAD, +IV, + O2 1L NC, B/L calf compression, +Levy, BG=98. Pt fatigued w/8/10 pain at rest at incision site and 10/10 pain when attempting to move. Hematoma/bruising under/behind L breast., bruising on L hip. Pt AOx4, pleasant and cooperative.

## 2022-12-01 NOTE — PHYSICAL THERAPY INITIAL EVALUATION ADULT - PERTINENT HX OF CURRENT PROBLEM, REHAB EVAL
Patient is a 77y old Female s/p b/l femoral endarterectomies and left axillary bifemoral bypass with graft. Postop H/H 9.4/29 from 11.5/37. PMhx: Carotid occlusion  s/p CEA, CAD, s/p CABG 7/2021, COPD on inhalers.

## 2022-12-01 NOTE — PROGRESS NOTE ADULT - SUBJECTIVE AND OBJECTIVE BOX
SURGERY DAILY PROGRESS NOTE:     SUBJECTIVE/ROS: Patient feels well. Reports pain is controlled.  Denies nausea, vomiting, chest pain, shortness of breath     MEDICATIONS  (STANDING):  acetaminophen     Tablet .. 650 milliGRAM(s) Oral every 6 hours  aspirin  chewable 81 milliGRAM(s) Oral daily  atorvastatin 40 milliGRAM(s) Oral at bedtime  ceFAZolin   IVPB      ceFAZolin   IVPB 2000 milliGRAM(s) IV Intermittent every 8 hours  diltiazem    Tablet 30 milliGRAM(s) Oral two times a day  heparin   Injectable 5000 Unit(s) SubCutaneous every 8 hours  influenza  Vaccine (HIGH DOSE) 0.7 milliLiter(s) IntraMuscular once    MEDICATIONS  (PRN):  oxyCODONE    IR 5 milliGRAM(s) Oral every 4 hours PRN Moderate Pain (4 - 6)  oxyCODONE    IR 10 milliGRAM(s) Oral every 4 hours PRN Severe Pain (7 - 10)    OBJECTIVE:  Vital Signs Last 24 Hrs  T(C): 36.7 (01 Dec 2022 06:23), Max: 37.1 (30 Nov 2022 23:00)  T(F): 98.1 (01 Dec 2022 06:23), Max: 98.7 (30 Nov 2022 23:00)  HR: 80 (01 Dec 2022 06:23) (54 - 106)  BP: 135/78 (01 Dec 2022 06:23) (83/40 - 179/74)  BP(mean): 92 (30 Nov 2022 22:15) (58 - 112)  RR: 15 (01 Dec 2022 06:23) (14 - 16)  SpO2: 96% (01 Dec 2022 06:23) (93% - 100%)    Parameters below as of 01 Dec 2022 06:23  Patient On (Oxygen Delivery Method): nasal cannula  O2 Flow (L/min): 2      I&O's Detail  30 Nov 2022 07:01  -  01 Dec 2022 07:00  --------------------------------------------------------  IN:    Albumin 25%  -  50 mL: 100 mL    IV PiggyBack: 150 mL    Lactated Ringers: 400 mL    Lactated Ringers Bolus: 500 mL    Oral Fluid: 240 mL  Total IN: 1390 mL    OUT:    Indwelling Catheter - Urethral (mL): 590 mL  Total OUT: 590 mL  Total NET: 800 mL      LABS:                        9.5    13.30 )-----------( 172      ( 01 Dec 2022 07:01 )             30.0     12-01    136  |  103  |  14  ----------------------------<  123<H>  4.0   |  21<L>  |  1.08    Ca    8.1<L>      01 Dec 2022 07:01  Phos  3.7     12-01  Mg     2.0     12-01      Physical Exam  General: lying in bed  Chest: L axillary cutdown site with aquacel in place, strikethrough within drawn line, no active oozing  - small hematoma noted over lateral inferior breast with mild tenderness and firmness of inferior aspect  Extremities: b/l groin sites with aquacel c/d/i, soft, no ecchymosis or evidence of hematoma  - R palpable DP/PT, L dopplerable DP/PT

## 2022-12-01 NOTE — PHYSICAL THERAPY INITIAL EVALUATION ADULT - ADDITIONAL COMMENTS
Pt lives in a private house with her two sons. there are 8 steps + 6 steps to enter with rail. Pt has to negotiate steps up to bedroom/bath. Pt's sons and son's girlfriend around to help assist pt.

## 2022-12-01 NOTE — CONSULT NOTE ADULT - ASSESSMENT
PAD  s/p  b/l femoral endarterectomies and left axillary bifemoral bypass with graft.   fu with vascular surgery     Carotid occlusion   s/p CEA   asa  statin     CAD  asa  statin   s/p CABG 7/2021    COPD  inhalers      Advanced care planning was discussed with patient and family.  Risks, benefits and alternatives of the cardiac treatments and medical therapy including procedures were discussed in detail and all questions were answered. Importance of compliance with medical therapy and lifestyle modification to improve cardiovascular health were addressed. Appropriate forms and patient educational materials were reviewed. 30 minutes face to face spent.

## 2022-12-02 LAB
ANION GAP SERPL CALC-SCNC: 6 MMOL/L — SIGNIFICANT CHANGE UP (ref 5–17)
ANION GAP SERPL CALC-SCNC: 8 MMOL/L — SIGNIFICANT CHANGE UP (ref 5–17)
BUN SERPL-MCNC: 13 MG/DL — SIGNIFICANT CHANGE UP (ref 7–23)
BUN SERPL-MCNC: 13 MG/DL — SIGNIFICANT CHANGE UP (ref 7–23)
CALCIUM SERPL-MCNC: 7.7 MG/DL — LOW (ref 8.4–10.5)
CALCIUM SERPL-MCNC: 7.7 MG/DL — LOW (ref 8.4–10.5)
CHLORIDE SERPL-SCNC: 107 MMOL/L — SIGNIFICANT CHANGE UP (ref 96–108)
CHLORIDE SERPL-SCNC: 108 MMOL/L — SIGNIFICANT CHANGE UP (ref 96–108)
CO2 SERPL-SCNC: 23 MMOL/L — SIGNIFICANT CHANGE UP (ref 22–31)
CO2 SERPL-SCNC: 25 MMOL/L — SIGNIFICANT CHANGE UP (ref 22–31)
CREAT SERPL-MCNC: 0.94 MG/DL — SIGNIFICANT CHANGE UP (ref 0.5–1.3)
CREAT SERPL-MCNC: 1.09 MG/DL — SIGNIFICANT CHANGE UP (ref 0.5–1.3)
EGFR: 53 ML/MIN/1.73M2 — LOW
EGFR: 63 ML/MIN/1.73M2 — SIGNIFICANT CHANGE UP
GLUCOSE SERPL-MCNC: 105 MG/DL — HIGH (ref 70–99)
GLUCOSE SERPL-MCNC: 125 MG/DL — HIGH (ref 70–99)
HCT VFR BLD CALC: 24.8 % — LOW (ref 34.5–45)
HCT VFR BLD CALC: 26.6 % — LOW (ref 34.5–45)
HGB BLD-MCNC: 7.8 G/DL — LOW (ref 11.5–15.5)
HGB BLD-MCNC: 8.3 G/DL — LOW (ref 11.5–15.5)
MAGNESIUM SERPL-MCNC: 1.9 MG/DL — SIGNIFICANT CHANGE UP (ref 1.6–2.6)
MAGNESIUM SERPL-MCNC: 2 MG/DL — SIGNIFICANT CHANGE UP (ref 1.6–2.6)
MCHC RBC-ENTMCNC: 30.1 PG — SIGNIFICANT CHANGE UP (ref 27–34)
MCHC RBC-ENTMCNC: 30.4 PG — SIGNIFICANT CHANGE UP (ref 27–34)
MCHC RBC-ENTMCNC: 31.2 GM/DL — LOW (ref 32–36)
MCHC RBC-ENTMCNC: 31.5 GM/DL — LOW (ref 32–36)
MCV RBC AUTO: 95.8 FL — SIGNIFICANT CHANGE UP (ref 80–100)
MCV RBC AUTO: 97.4 FL — SIGNIFICANT CHANGE UP (ref 80–100)
NRBC # BLD: 0 /100 WBCS — SIGNIFICANT CHANGE UP (ref 0–0)
NRBC # BLD: 0 /100 WBCS — SIGNIFICANT CHANGE UP (ref 0–0)
PHOSPHATE SERPL-MCNC: 2.4 MG/DL — LOW (ref 2.5–4.5)
PHOSPHATE SERPL-MCNC: 2.5 MG/DL — SIGNIFICANT CHANGE UP (ref 2.5–4.5)
PLATELET # BLD AUTO: 121 K/UL — LOW (ref 150–400)
PLATELET # BLD AUTO: 134 K/UL — LOW (ref 150–400)
POTASSIUM SERPL-MCNC: 3.8 MMOL/L — SIGNIFICANT CHANGE UP (ref 3.5–5.3)
POTASSIUM SERPL-MCNC: 3.9 MMOL/L — SIGNIFICANT CHANGE UP (ref 3.5–5.3)
POTASSIUM SERPL-SCNC: 3.8 MMOL/L — SIGNIFICANT CHANGE UP (ref 3.5–5.3)
POTASSIUM SERPL-SCNC: 3.9 MMOL/L — SIGNIFICANT CHANGE UP (ref 3.5–5.3)
RBC # BLD: 2.59 M/UL — LOW (ref 3.8–5.2)
RBC # BLD: 2.73 M/UL — LOW (ref 3.8–5.2)
RBC # FLD: 14.6 % — HIGH (ref 10.3–14.5)
RBC # FLD: 14.6 % — HIGH (ref 10.3–14.5)
SODIUM SERPL-SCNC: 138 MMOL/L — SIGNIFICANT CHANGE UP (ref 135–145)
SODIUM SERPL-SCNC: 139 MMOL/L — SIGNIFICANT CHANGE UP (ref 135–145)
TROPONIN T, HIGH SENSITIVITY RESULT: 29 NG/L — SIGNIFICANT CHANGE UP (ref 0–51)
WBC # BLD: 10.55 K/UL — HIGH (ref 3.8–10.5)
WBC # BLD: 10.56 K/UL — HIGH (ref 3.8–10.5)
WBC # FLD AUTO: 10.55 K/UL — HIGH (ref 3.8–10.5)
WBC # FLD AUTO: 10.56 K/UL — HIGH (ref 3.8–10.5)

## 2022-12-02 PROCEDURE — 93010 ELECTROCARDIOGRAM REPORT: CPT

## 2022-12-02 PROCEDURE — 71045 X-RAY EXAM CHEST 1 VIEW: CPT | Mod: 26

## 2022-12-02 RX ORDER — IBUPROFEN 200 MG
400 TABLET ORAL EVERY 6 HOURS
Refills: 0 | Status: DISCONTINUED | OUTPATIENT
Start: 2022-12-02 | End: 2022-12-02

## 2022-12-02 RX ORDER — ONDANSETRON 8 MG/1
4 TABLET, FILM COATED ORAL ONCE
Refills: 0 | Status: DISCONTINUED | OUTPATIENT
Start: 2022-12-02 | End: 2022-12-06

## 2022-12-02 RX ORDER — SODIUM CHLORIDE 9 MG/ML
500 INJECTION INTRAMUSCULAR; INTRAVENOUS; SUBCUTANEOUS ONCE
Refills: 0 | Status: COMPLETED | OUTPATIENT
Start: 2022-12-02 | End: 2022-12-02

## 2022-12-02 RX ORDER — SODIUM CHLORIDE 9 MG/ML
500 INJECTION, SOLUTION INTRAVENOUS ONCE
Refills: 0 | Status: COMPLETED | OUTPATIENT
Start: 2022-12-02 | End: 2022-12-02

## 2022-12-02 RX ORDER — ONDANSETRON 8 MG/1
4 TABLET, FILM COATED ORAL ONCE
Refills: 0 | Status: DISCONTINUED | OUTPATIENT
Start: 2022-12-02 | End: 2022-12-02

## 2022-12-02 RX ORDER — ACETAMINOPHEN 500 MG
975 TABLET ORAL EVERY 6 HOURS
Refills: 0 | Status: DISCONTINUED | OUTPATIENT
Start: 2022-12-02 | End: 2022-12-06

## 2022-12-02 RX ADMIN — ATORVASTATIN CALCIUM 40 MILLIGRAM(S): 80 TABLET, FILM COATED ORAL at 21:29

## 2022-12-02 RX ADMIN — OXYCODONE HYDROCHLORIDE 10 MILLIGRAM(S): 5 TABLET ORAL at 19:40

## 2022-12-02 RX ADMIN — OXYCODONE HYDROCHLORIDE 10 MILLIGRAM(S): 5 TABLET ORAL at 12:48

## 2022-12-02 RX ADMIN — OXYCODONE HYDROCHLORIDE 10 MILLIGRAM(S): 5 TABLET ORAL at 06:04

## 2022-12-02 RX ADMIN — Medication 81 MILLIGRAM(S): at 11:48

## 2022-12-02 RX ADMIN — HEPARIN SODIUM 5000 UNIT(S): 5000 INJECTION INTRAVENOUS; SUBCUTANEOUS at 06:03

## 2022-12-02 RX ADMIN — Medication 30 MILLIGRAM(S): at 06:03

## 2022-12-02 RX ADMIN — HEPARIN SODIUM 5000 UNIT(S): 5000 INJECTION INTRAVENOUS; SUBCUTANEOUS at 21:28

## 2022-12-02 RX ADMIN — TIOTROPIUM BROMIDE 2 PUFF(S): 18 CAPSULE ORAL; RESPIRATORY (INHALATION) at 11:49

## 2022-12-02 RX ADMIN — Medication 650 MILLIGRAM(S): at 02:10

## 2022-12-02 RX ADMIN — OXYCODONE HYDROCHLORIDE 10 MILLIGRAM(S): 5 TABLET ORAL at 18:59

## 2022-12-02 RX ADMIN — OXYCODONE HYDROCHLORIDE 10 MILLIGRAM(S): 5 TABLET ORAL at 11:48

## 2022-12-02 RX ADMIN — Medication 650 MILLIGRAM(S): at 09:10

## 2022-12-02 RX ADMIN — Medication 650 MILLIGRAM(S): at 02:40

## 2022-12-02 RX ADMIN — BUDESONIDE AND FORMOTEROL FUMARATE DIHYDRATE 2 PUFF(S): 160; 4.5 AEROSOL RESPIRATORY (INHALATION) at 06:03

## 2022-12-02 RX ADMIN — Medication 975 MILLIGRAM(S): at 17:15

## 2022-12-02 RX ADMIN — SODIUM CHLORIDE 1000 MILLILITER(S): 9 INJECTION INTRAMUSCULAR; INTRAVENOUS; SUBCUTANEOUS at 14:29

## 2022-12-02 RX ADMIN — SODIUM CHLORIDE 500 MILLILITER(S): 9 INJECTION, SOLUTION INTRAVENOUS at 15:34

## 2022-12-02 RX ADMIN — BUDESONIDE AND FORMOTEROL FUMARATE DIHYDRATE 2 PUFF(S): 160; 4.5 AEROSOL RESPIRATORY (INHALATION) at 17:15

## 2022-12-02 RX ADMIN — HEPARIN SODIUM 5000 UNIT(S): 5000 INJECTION INTRAVENOUS; SUBCUTANEOUS at 13:19

## 2022-12-02 RX ADMIN — OXYCODONE HYDROCHLORIDE 10 MILLIGRAM(S): 5 TABLET ORAL at 06:43

## 2022-12-02 RX ADMIN — Medication 400 MILLIGRAM(S): at 11:48

## 2022-12-02 NOTE — PROVIDER CONTACT NOTE (OTHER) - ASSESSMENT
pt in bed in NAD, states she feels tired. no complaints of chest pain or dizziness. no signs of bleeding noted. dressings remained clean, dry and intact. RN at bedside with manual bp cuff - unable to get a reading higher than 82/40.

## 2022-12-02 NOTE — PROGRESS NOTE ADULT - SUBJECTIVE AND OBJECTIVE BOX
DATE OF SERVICE: 12-02-22 @ 08:29    Subjective: Patient seen and examined. No new events except as noted.     SUBJECTIVE/ROS:  has pain at site of surgery   No chest pain, dyspnea, palpitation, or dizziness.       MEDICATIONS:  MEDICATIONS  (STANDING):  acetaminophen     Tablet .. 650 milliGRAM(s) Oral every 6 hours  aspirin  chewable 81 milliGRAM(s) Oral daily  atorvastatin 40 milliGRAM(s) Oral at bedtime  budesonide  80 MICROgram(s)/formoterol 4.5 MICROgram(s) Inhaler 2 Puff(s) Inhalation two times a day  diltiazem    Tablet 30 milliGRAM(s) Oral two times a day  heparin   Injectable 5000 Unit(s) SubCutaneous every 8 hours  influenza  Vaccine (HIGH DOSE) 0.7 milliLiter(s) IntraMuscular once  tiotropium 2.5 MICROgram(s) Inhaler 2 Puff(s) Inhalation daily      PHYSICAL EXAM:  T(C): 36.6 (12-02-22 @ 05:10), Max: 37.2 (12-01-22 @ 10:08)  HR: 60 (12-02-22 @ 05:10) (58 - 83)  BP: 112/54 (12-02-22 @ 05:10) (98/64 - 115/72)  RR: 18 (12-02-22 @ 05:10) (17 - 18)  SpO2: 96% (12-02-22 @ 05:10) (91% - 98%)  Wt(kg): --  I&O's Summary    01 Dec 2022 07:01  -  02 Dec 2022 07:00  --------------------------------------------------------  IN: 1130 mL / OUT: 1625 mL / NET: -495 mL            JVP: Normal  Neck: supple  Lung: clear   CV: S1 S2 , Murmur:  Abd: soft  Ext: No edema  neuro: Awake / alert  Psych: flat affect  Skin: left chest area hematoma     LABS/DATA:    CARDIAC MARKERS:                                8.3    10.55 )-----------( 121      ( 02 Dec 2022 07:12 )             26.6     12-02    139  |  108  |  13  ----------------------------<  105<H>  3.9   |  25  |  1.09    Ca    7.7<L>      02 Dec 2022 07:12  Phos  2.5     12-02  Mg     2.0     12-02      proBNP:   Lipid Profile:   HgA1c:   TSH:     TELE:  EKG:

## 2022-12-02 NOTE — PROGRESS NOTE ADULT - SUBJECTIVE AND OBJECTIVE BOX
SURGERY  Spectralink [id number]    Pt seen and examined. Pt denies any overnight events. Pt reports pain is well controlled.      ICU Vital Signs Last 24 Hrs  T(C): 36.6 (02 Dec 2022 05:10), Max: 37.2 (01 Dec 2022 10:08)  T(F): 97.8 (02 Dec 2022 05:10), Max: 99 (01 Dec 2022 10:08)  HR: 60 (02 Dec 2022 05:10) (58 - 83)  BP: 112/54 (02 Dec 2022 05:10) (98/64 - 115/72)  BP(mean): --  ABP: --  ABP(mean): --  RR: 18 (02 Dec 2022 05:10) (17 - 18)  SpO2: 96% (02 Dec 2022 05:10) (91% - 98%)      I&O's Detail    01 Dec 2022 07:01  -  02 Dec 2022 07:00  --------------------------------------------------------  IN:    IV PiggyBack: 100 mL    Oral Fluid: 580 mL    sodium chloride 0.9%: 450 mL  Total IN: 1130 mL    OUT:    Indwelling Catheter - Urethral (mL): 1625 mL  Total OUT: 1625 mL    Total NET: -495 mL    Physical exam: Pt sitting comfortably in bed in NAD  General: lying in bed  Chest: L axillary cutdown site with aquacel in place, no active oozing  - small hematoma noted over lateral inferior breast with mild tenderness and firmness of inferior aspect, stable  Extremities: b/l groin sites with aquacel c/d/i, soft, no ecchymosis or evidence of hematoma  - R palpable DP/PT, L dopplerable DP/PT    LABS:                        8.3    10.55 )-----------( 121      ( 02 Dec 2022 07:12 )             26.6     12-02    139  |  108  |  13  ----------------------------<  105<H>  3.9   |  25  |  1.09    Ca    7.7<L>      02 Dec 2022 07:12  Phos  2.5     12-02  Mg     2.0     12-02      77y old  Female s/p B/L Femoral endarterectomies, creation of Axillary to bifemoral bypss using graft POD#2    -H/H 8.3/26.6 (9.5/30), plts 121, pt asymptomatic at present, Mild hypotention last evening 99/56, presently stable 112/54, HR 60's-80's (on diltiazem), will monitor/trend afternoon CBC, T&S avail until MN 12/4  -Continue DVT Prophylaxis with SCD's, SQH  -Continue Incentive Spirometry  -Continue analgesia  -Encourage OOB/ambulation with assistance     SURGERY  Spectralink [id number]    Pt seen and examined. Pt denies any overnight events. Pt reports pain is well controlled.      ICU Vital Signs Last 24 Hrs  T(C): 36.6 (02 Dec 2022 05:10), Max: 37.2 (01 Dec 2022 10:08)  T(F): 97.8 (02 Dec 2022 05:10), Max: 99 (01 Dec 2022 10:08)  HR: 60 (02 Dec 2022 05:10) (58 - 83)  BP: 112/54 (02 Dec 2022 05:10) (98/64 - 115/72)  BP(mean): --  ABP: --  ABP(mean): --  RR: 18 (02 Dec 2022 05:10) (17 - 18)  SpO2: 96% (02 Dec 2022 05:10) (91% - 98%)      I&O's Detail    01 Dec 2022 07:01  -  02 Dec 2022 07:00  --------------------------------------------------------  IN:    IV PiggyBack: 100 mL    Oral Fluid: 580 mL    sodium chloride 0.9%: 450 mL  Total IN: 1130 mL    OUT:    Indwelling Catheter - Urethral (mL): 1625 mL  Total OUT: 1625 mL    Total NET: -495 mL    Physical exam: Pt sitting comfortably in bed in NAD  General: lying in bed  Chest: L axillary cutdown site with aquacel in place, no active oozing  - small hematoma noted over lateral inferior breast with mild tenderness and firmness of inferior aspect, stable  Extremities: b/l groin sites with aquacel c/d/i, soft, no ecchymosis or evidence of hematoma  - R palpable DP/PT, L dopplerable DP/PT    LABS:                        8.3    10.55 )-----------( 121      ( 02 Dec 2022 07:12 )             26.6     12-02    139  |  108  |  13  ----------------------------<  105<H>  3.9   |  25  |  1.09    Ca    7.7<L>      02 Dec 2022 07:12  Phos  2.5     12-02  Mg     2.0     12-02      77y old  Female s/p B/L Femoral endarterectomies, creation of Axillary to bifemoral bypss using graft POD#2    -H/H 8.3/26.6 (9.5/30), plts 121, pt asymptomatic at present, Mild hypotension last evening 99/56, presently stable 112/54, HR 60's-80's (on diltiazem), will monitor/trend afternoon CBC, T&S avail until MN 12/4  -Continue DVT Prophylaxis with SCD's, SQH  -Continue Incentive Spirometry  -Continue analgesia  -Encourage OOB/ambulation with assistance  -Follow up CXR, for mild hypoxia, requring 2L NC  -PT Eval

## 2022-12-03 LAB
ANION GAP SERPL CALC-SCNC: 10 MMOL/L — SIGNIFICANT CHANGE UP (ref 5–17)
BUN SERPL-MCNC: 12 MG/DL — SIGNIFICANT CHANGE UP (ref 7–23)
CALCIUM SERPL-MCNC: 7.6 MG/DL — LOW (ref 8.4–10.5)
CHLORIDE SERPL-SCNC: 105 MMOL/L — SIGNIFICANT CHANGE UP (ref 96–108)
CO2 SERPL-SCNC: 22 MMOL/L — SIGNIFICANT CHANGE UP (ref 22–31)
CREAT SERPL-MCNC: 0.82 MG/DL — SIGNIFICANT CHANGE UP (ref 0.5–1.3)
EGFR: 75 ML/MIN/1.73M2 — SIGNIFICANT CHANGE UP
GLUCOSE SERPL-MCNC: 95 MG/DL — SIGNIFICANT CHANGE UP (ref 70–99)
HCT VFR BLD CALC: 32.9 % — LOW (ref 34.5–45)
HCT VFR BLD CALC: 33.6 % — LOW (ref 34.5–45)
HGB BLD-MCNC: 10.7 G/DL — LOW (ref 11.5–15.5)
HGB BLD-MCNC: 10.8 G/DL — LOW (ref 11.5–15.5)
MAGNESIUM SERPL-MCNC: 1.8 MG/DL — SIGNIFICANT CHANGE UP (ref 1.6–2.6)
MCHC RBC-ENTMCNC: 29 PG — SIGNIFICANT CHANGE UP (ref 27–34)
MCHC RBC-ENTMCNC: 29.2 PG — SIGNIFICANT CHANGE UP (ref 27–34)
MCHC RBC-ENTMCNC: 32.1 GM/DL — SIGNIFICANT CHANGE UP (ref 32–36)
MCHC RBC-ENTMCNC: 32.5 GM/DL — SIGNIFICANT CHANGE UP (ref 32–36)
MCV RBC AUTO: 89.9 FL — SIGNIFICANT CHANGE UP (ref 80–100)
MCV RBC AUTO: 90.3 FL — SIGNIFICANT CHANGE UP (ref 80–100)
NRBC # BLD: 0 /100 WBCS — SIGNIFICANT CHANGE UP (ref 0–0)
NRBC # BLD: 0 /100 WBCS — SIGNIFICANT CHANGE UP (ref 0–0)
PHOSPHATE SERPL-MCNC: 2.2 MG/DL — LOW (ref 2.5–4.5)
PLATELET # BLD AUTO: 121 K/UL — LOW (ref 150–400)
PLATELET # BLD AUTO: 121 K/UL — LOW (ref 150–400)
POTASSIUM SERPL-MCNC: 3.9 MMOL/L — SIGNIFICANT CHANGE UP (ref 3.5–5.3)
POTASSIUM SERPL-SCNC: 3.9 MMOL/L — SIGNIFICANT CHANGE UP (ref 3.5–5.3)
RBC # BLD: 3.66 M/UL — LOW (ref 3.8–5.2)
RBC # BLD: 3.72 M/UL — LOW (ref 3.8–5.2)
RBC # FLD: 18.2 % — HIGH (ref 10.3–14.5)
RBC # FLD: 18.5 % — HIGH (ref 10.3–14.5)
SODIUM SERPL-SCNC: 137 MMOL/L — SIGNIFICANT CHANGE UP (ref 135–145)
WBC # BLD: 11.68 K/UL — HIGH (ref 3.8–10.5)
WBC # BLD: 12.9 K/UL — HIGH (ref 3.8–10.5)
WBC # FLD AUTO: 11.68 K/UL — HIGH (ref 3.8–10.5)
WBC # FLD AUTO: 12.9 K/UL — HIGH (ref 3.8–10.5)

## 2022-12-03 RX ADMIN — Medication 81 MILLIGRAM(S): at 11:13

## 2022-12-03 RX ADMIN — TIOTROPIUM BROMIDE 2 PUFF(S): 18 CAPSULE ORAL; RESPIRATORY (INHALATION) at 11:09

## 2022-12-03 RX ADMIN — HEPARIN SODIUM 5000 UNIT(S): 5000 INJECTION INTRAVENOUS; SUBCUTANEOUS at 13:59

## 2022-12-03 RX ADMIN — BUDESONIDE AND FORMOTEROL FUMARATE DIHYDRATE 2 PUFF(S): 160; 4.5 AEROSOL RESPIRATORY (INHALATION) at 18:04

## 2022-12-03 RX ADMIN — HEPARIN SODIUM 5000 UNIT(S): 5000 INJECTION INTRAVENOUS; SUBCUTANEOUS at 06:16

## 2022-12-03 RX ADMIN — Medication 975 MILLIGRAM(S): at 11:12

## 2022-12-03 RX ADMIN — OXYCODONE HYDROCHLORIDE 10 MILLIGRAM(S): 5 TABLET ORAL at 11:13

## 2022-12-03 RX ADMIN — HEPARIN SODIUM 5000 UNIT(S): 5000 INJECTION INTRAVENOUS; SUBCUTANEOUS at 21:16

## 2022-12-03 RX ADMIN — Medication 975 MILLIGRAM(S): at 18:04

## 2022-12-03 RX ADMIN — OXYCODONE HYDROCHLORIDE 10 MILLIGRAM(S): 5 TABLET ORAL at 12:13

## 2022-12-03 RX ADMIN — OXYCODONE HYDROCHLORIDE 10 MILLIGRAM(S): 5 TABLET ORAL at 22:16

## 2022-12-03 RX ADMIN — Medication 975 MILLIGRAM(S): at 06:16

## 2022-12-03 RX ADMIN — OXYCODONE HYDROCHLORIDE 10 MILLIGRAM(S): 5 TABLET ORAL at 03:18

## 2022-12-03 RX ADMIN — OXYCODONE HYDROCHLORIDE 10 MILLIGRAM(S): 5 TABLET ORAL at 21:16

## 2022-12-03 RX ADMIN — OXYCODONE HYDROCHLORIDE 10 MILLIGRAM(S): 5 TABLET ORAL at 02:48

## 2022-12-03 RX ADMIN — Medication 975 MILLIGRAM(S): at 00:59

## 2022-12-03 RX ADMIN — Medication 85 MILLIMOLE(S): at 11:09

## 2022-12-03 RX ADMIN — BUDESONIDE AND FORMOTEROL FUMARATE DIHYDRATE 2 PUFF(S): 160; 4.5 AEROSOL RESPIRATORY (INHALATION) at 06:14

## 2022-12-03 RX ADMIN — Medication 975 MILLIGRAM(S): at 01:29

## 2022-12-03 RX ADMIN — ATORVASTATIN CALCIUM 40 MILLIGRAM(S): 80 TABLET, FILM COATED ORAL at 21:16

## 2022-12-03 NOTE — PROGRESS NOTE ADULT - SUBJECTIVE AND OBJECTIVE BOX
DATE OF SERVICE: 12-03-22 @ 07:42    Subjective: Patient seen and examined. No new events except as noted.     SUBJECTIVE/ROS:  feels better  No chest pain, dyspnea, palpitation, or dizziness.       MEDICATIONS:  MEDICATIONS  (STANDING):  acetaminophen     Tablet .. 975 milliGRAM(s) Oral every 6 hours  aspirin  chewable 81 milliGRAM(s) Oral daily  atorvastatin 40 milliGRAM(s) Oral at bedtime  budesonide  80 MICROgram(s)/formoterol 4.5 MICROgram(s) Inhaler 2 Puff(s) Inhalation two times a day  heparin   Injectable 5000 Unit(s) SubCutaneous every 8 hours  influenza  Vaccine (HIGH DOSE) 0.7 milliLiter(s) IntraMuscular once  tiotropium 2.5 MICROgram(s) Inhaler 2 Puff(s) Inhalation daily      PHYSICAL EXAM:  T(C): 36.8 (12-03-22 @ 05:00), Max: 37.1 (12-02-22 @ 09:12)  HR: 60 (12-03-22 @ 05:00) (55 - 69)  BP: 105/61 (12-03-22 @ 05:00) (82/40 - 146/72)  RR: 17 (12-03-22 @ 05:00) (17 - 18)  SpO2: 92% (12-03-22 @ 05:00) (92% - 96%)  Wt(kg): --  I&O's Summary    02 Dec 2022 07:01  -  03 Dec 2022 07:00  --------------------------------------------------------  IN: 1860 mL / OUT: 1800 mL / NET: 60 mL            JVP: Normal  Neck: supple  Lung: clear   CV: S1 S2 , Murmur:  Abd: soft  Ext: No edema  neuro: Awake / alert  Psych: flat affect  Skin: hematoma in axillary region     LABS/DATA:    CARDIAC MARKERS:                                10.8   11.68 )-----------( 121      ( 03 Dec 2022 00:49 )             33.6     12-02    138  |  107  |  13  ----------------------------<  125<H>  3.8   |  23  |  0.94    Ca    7.7<L>      02 Dec 2022 15:06  Phos  2.4     12-02  Mg     1.9     12-02      proBNP:   Lipid Profile:   HgA1c:   TSH:     TELE:  EKG:

## 2022-12-03 NOTE — PROGRESS NOTE ADULT - SUBJECTIVE AND OBJECTIVE BOX
Surgery Progress Note     24hour Events: Patient was hypotensive yesterday afternoon w/ SBPs in 80s. EKG stable, trops WNL. Patient was asymptomatic except for fatigue, L breast hematoma stable. S/p 500cc bolus x2 and 2u pRBC. Home diltiazem held. SBP improved to 100s overnight. Post-transfusion H/H 10.8/33.6 (7.8/24.8).    Subjective:  Patient seen and examined. Patient reports that pain is stable. Denies SOB, chest pain.      Vital Signs:  Vital Signs Last 24 Hrs  T(C): 36.6 (03 Dec 2022 09:24), Max: 36.9 (02 Dec 2022 18:02)  T(F): 97.8 (03 Dec 2022 09:24), Max: 98.5 (02 Dec 2022 18:54)  HR: 80 (03 Dec 2022 09:24) (55 - 80)  BP: 96/53 (03 Dec 2022 09:24) (82/40 - 146/72)  BP(mean): --  RR: 17 (03 Dec 2022 09:24) (17 - 18)  SpO2: 94% (03 Dec 2022 09:24) (92% - 96%)    Parameters below as of 03 Dec 2022 09:24  Patient On (Oxygen Delivery Method): nasal cannula  O2 Flow (L/min): 2      CAPILLARY BLOOD GLUCOSE          I&O's Detail    02 Dec 2022 07:01  -  03 Dec 2022 07:00  --------------------------------------------------------  IN:    Lactated Ringers Bolus: 500 mL    Oral Fluid: 860 mL    Sodium Chloride 0.9% Bolus: 500 mL  Total IN: 1860 mL    OUT:    Indwelling Catheter - Urethral (mL): 1800 mL  Total OUT: 1800 mL    Total NET: 60 mL      03 Dec 2022 07:01  -  03 Dec 2022 09:41  --------------------------------------------------------  IN:    Oral Fluid: 180 mL  Total IN: 180 mL    OUT:  Total OUT: 0 mL    Total NET: 180 mL            Physical Exam:  General: NAD, resting comfortably in bed  Chest: L axillary cutdown site with aquacel in place, no active oozing  - hematoma noted over lateral inferior breast with mild tenderness and firmness of inferior aspect, stable  Extremities: b/l groin sites with aquacel c/d/i, soft, no ecchymosis or evidence of hematoma  - R palpable DP/PT, + L DP/PT signals      Labs:    12-03    137  |  105  |  12  ----------------------------<  95  3.9   |  22  |  0.82    Ca    7.6<L>      03 Dec 2022 07:18  Phos  2.2     12-03  Mg     1.8     12-03                              10.7   12.90 )-----------( 121      ( 03 Dec 2022 07:29 )             32.9

## 2022-12-04 LAB
ANION GAP SERPL CALC-SCNC: 9 MMOL/L — SIGNIFICANT CHANGE UP (ref 5–17)
APPEARANCE UR: CLEAR — SIGNIFICANT CHANGE UP
BILIRUB UR-MCNC: NEGATIVE — SIGNIFICANT CHANGE UP
BUN SERPL-MCNC: 11 MG/DL — SIGNIFICANT CHANGE UP (ref 7–23)
CALCIUM SERPL-MCNC: 7.9 MG/DL — LOW (ref 8.4–10.5)
CHLORIDE SERPL-SCNC: 106 MMOL/L — SIGNIFICANT CHANGE UP (ref 96–108)
CO2 SERPL-SCNC: 22 MMOL/L — SIGNIFICANT CHANGE UP (ref 22–31)
COLOR SPEC: SIGNIFICANT CHANGE UP
CREAT SERPL-MCNC: 0.78 MG/DL — SIGNIFICANT CHANGE UP (ref 0.5–1.3)
DIFF PNL FLD: ABNORMAL
EGFR: 79 ML/MIN/1.73M2 — SIGNIFICANT CHANGE UP
GLUCOSE SERPL-MCNC: 108 MG/DL — HIGH (ref 70–99)
GLUCOSE UR QL: NEGATIVE — SIGNIFICANT CHANGE UP
HCT VFR BLD CALC: 35.3 % — SIGNIFICANT CHANGE UP (ref 34.5–45)
HGB BLD-MCNC: 11.4 G/DL — LOW (ref 11.5–15.5)
KETONES UR-MCNC: NEGATIVE — SIGNIFICANT CHANGE UP
LEUKOCYTE ESTERASE UR-ACNC: ABNORMAL
MAGNESIUM SERPL-MCNC: 1.9 MG/DL — SIGNIFICANT CHANGE UP (ref 1.6–2.6)
MCHC RBC-ENTMCNC: 29.6 PG — SIGNIFICANT CHANGE UP (ref 27–34)
MCHC RBC-ENTMCNC: 32.3 GM/DL — SIGNIFICANT CHANGE UP (ref 32–36)
MCV RBC AUTO: 91.7 FL — SIGNIFICANT CHANGE UP (ref 80–100)
NITRITE UR-MCNC: NEGATIVE — SIGNIFICANT CHANGE UP
NRBC # BLD: 0 /100 WBCS — SIGNIFICANT CHANGE UP (ref 0–0)
PH UR: 7 — SIGNIFICANT CHANGE UP (ref 5–8)
PHOSPHATE SERPL-MCNC: 2.8 MG/DL — SIGNIFICANT CHANGE UP (ref 2.5–4.5)
PLATELET # BLD AUTO: 141 K/UL — LOW (ref 150–400)
POTASSIUM SERPL-MCNC: 3.9 MMOL/L — SIGNIFICANT CHANGE UP (ref 3.5–5.3)
POTASSIUM SERPL-SCNC: 3.9 MMOL/L — SIGNIFICANT CHANGE UP (ref 3.5–5.3)
PROT UR-MCNC: NEGATIVE — SIGNIFICANT CHANGE UP
RBC # BLD: 3.85 M/UL — SIGNIFICANT CHANGE UP (ref 3.8–5.2)
RBC # FLD: 17.8 % — HIGH (ref 10.3–14.5)
SODIUM SERPL-SCNC: 137 MMOL/L — SIGNIFICANT CHANGE UP (ref 135–145)
SP GR SPEC: 1.01 — SIGNIFICANT CHANGE UP (ref 1.01–1.02)
UROBILINOGEN FLD QL: NEGATIVE — SIGNIFICANT CHANGE UP
WBC # BLD: 9.87 K/UL — SIGNIFICANT CHANGE UP (ref 3.8–10.5)
WBC # FLD AUTO: 9.87 K/UL — SIGNIFICANT CHANGE UP (ref 3.8–10.5)

## 2022-12-04 RX ADMIN — HEPARIN SODIUM 5000 UNIT(S): 5000 INJECTION INTRAVENOUS; SUBCUTANEOUS at 21:57

## 2022-12-04 RX ADMIN — OXYCODONE HYDROCHLORIDE 10 MILLIGRAM(S): 5 TABLET ORAL at 22:30

## 2022-12-04 RX ADMIN — Medication 81 MILLIGRAM(S): at 11:25

## 2022-12-04 RX ADMIN — ATORVASTATIN CALCIUM 40 MILLIGRAM(S): 80 TABLET, FILM COATED ORAL at 21:57

## 2022-12-04 RX ADMIN — HEPARIN SODIUM 5000 UNIT(S): 5000 INJECTION INTRAVENOUS; SUBCUTANEOUS at 05:13

## 2022-12-04 RX ADMIN — Medication 975 MILLIGRAM(S): at 11:19

## 2022-12-04 RX ADMIN — OXYCODONE HYDROCHLORIDE 10 MILLIGRAM(S): 5 TABLET ORAL at 05:29

## 2022-12-04 RX ADMIN — BUDESONIDE AND FORMOTEROL FUMARATE DIHYDRATE 2 PUFF(S): 160; 4.5 AEROSOL RESPIRATORY (INHALATION) at 05:13

## 2022-12-04 RX ADMIN — HEPARIN SODIUM 5000 UNIT(S): 5000 INJECTION INTRAVENOUS; SUBCUTANEOUS at 14:48

## 2022-12-04 RX ADMIN — Medication 975 MILLIGRAM(S): at 05:13

## 2022-12-04 RX ADMIN — BUDESONIDE AND FORMOTEROL FUMARATE DIHYDRATE 2 PUFF(S): 160; 4.5 AEROSOL RESPIRATORY (INHALATION) at 17:31

## 2022-12-04 RX ADMIN — Medication 975 MILLIGRAM(S): at 17:32

## 2022-12-04 RX ADMIN — OXYCODONE HYDROCHLORIDE 10 MILLIGRAM(S): 5 TABLET ORAL at 04:29

## 2022-12-04 RX ADMIN — OXYCODONE HYDROCHLORIDE 10 MILLIGRAM(S): 5 TABLET ORAL at 17:32

## 2022-12-04 RX ADMIN — OXYCODONE HYDROCHLORIDE 10 MILLIGRAM(S): 5 TABLET ORAL at 12:19

## 2022-12-04 RX ADMIN — TIOTROPIUM BROMIDE 2 PUFF(S): 18 CAPSULE ORAL; RESPIRATORY (INHALATION) at 11:19

## 2022-12-04 RX ADMIN — OXYCODONE HYDROCHLORIDE 10 MILLIGRAM(S): 5 TABLET ORAL at 23:30

## 2022-12-04 RX ADMIN — Medication 975 MILLIGRAM(S): at 06:13

## 2022-12-04 RX ADMIN — OXYCODONE HYDROCHLORIDE 10 MILLIGRAM(S): 5 TABLET ORAL at 11:19

## 2022-12-04 NOTE — PROGRESS NOTE ADULT - ATTENDING COMMENTS
Full progress note as above; discussed with vascular surgery fellow.  She is status post a left axillary to bifemoral bypass with bilateral femoral endarterectomies.  The procedure was done for bilateral rest pain.  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  Her blood pressure should ONLY be measured in her left arm.  It is artificially lower on the right side due to the innominate artery stenosis.  She has less incisional pain and has normal rest pain.  Her legs are warm and the incisions are healing appropriately.  The area of ecchymosis in the left flank a stable and not expanding.  She needs to get out of bed and ambulate.  She can probably be discharged home soon.

## 2022-12-04 NOTE — PROGRESS NOTE ADULT - SUBJECTIVE AND OBJECTIVE BOX
TEAM Surgery Progress Note    SUBJECTIVE: Patient seen and examined at bedside with surgical team, patient without complaints.    OBJECTIVE:    Vital Signs Last 24 Hrs  T(C): 36.7 (04 Dec 2022 05:28), Max: 36.7 (03 Dec 2022 13:11)  T(F): 98.1 (04 Dec 2022 05:28), Max: 98.1 (03 Dec 2022 17:35)  HR: 62 (04 Dec 2022 05:28) (62 - 80)  BP: 135/69 (04 Dec 2022 05:28) (96/53 - 135/69)  BP(mean): --  RR: 18 (04 Dec 2022 05:28) (17 - 18)  SpO2: 95% (04 Dec 2022 05:28) (93% - 96%)    Parameters below as of 04 Dec 2022 05:28  Patient On (Oxygen Delivery Method): nasal cannula  O2 Flow (L/min): 2  I&O's Detail    03 Dec 2022 07:01  -  04 Dec 2022 07:00  --------------------------------------------------------  IN:    Oral Fluid: 1040 mL  Total IN: 1040 mL    OUT:    Indwelling Catheter - Urethral (mL): 1825 mL  Total OUT: 1825 mL    Total NET: -785 mL      MEDICATIONS  (STANDING):  acetaminophen     Tablet .. 975 milliGRAM(s) Oral every 6 hours  aspirin  chewable 81 milliGRAM(s) Oral daily  atorvastatin 40 milliGRAM(s) Oral at bedtime  budesonide  80 MICROgram(s)/formoterol 4.5 MICROgram(s) Inhaler 2 Puff(s) Inhalation two times a day  heparin   Injectable 5000 Unit(s) SubCutaneous every 8 hours  influenza  Vaccine (HIGH DOSE) 0.7 milliLiter(s) IntraMuscular once  tiotropium 2.5 MICROgram(s) Inhaler 2 Puff(s) Inhalation daily    MEDICATIONS  (PRN):  ondansetron Injectable 4 milliGRAM(s) IV Push once PRN Nausea and/or Vomiting  oxyCODONE    IR 5 milliGRAM(s) Oral every 4 hours PRN Moderate Pain (4 - 6)  oxyCODONE    IR 10 milliGRAM(s) Oral every 4 hours PRN Severe Pain (7 - 10)      PHYSICAL EXAM:  General: NAD, resting comfortably in bed  Chest: L axillary cutdown site with aquacel in place, no active oozing  - hematoma noted over lateral inferior breast with mild tenderness and firmness of inferior aspect, stable  Extremities: b/l groin sites with aquacel c/d/i, soft, no ecchymosis or evidence of hematoma  - R palpable DP/PT, + L DP/PT signals    LABS:                        11.4   9.87  )-----------( 141      ( 04 Dec 2022 07:06 )             35.3     12-04    137  |  106  |  11  ----------------------------<  108<H>  3.9   |  22  |  0.78    Ca    7.9<L>      04 Dec 2022 07:11  Phos  2.8     12-04  Mg     1.9     12-04                IMAGING:

## 2022-12-04 NOTE — PROGRESS NOTE ADULT - SUBJECTIVE AND OBJECTIVE BOX
DATE OF SERVICE: 12-04-22 @ 08:16    Subjective: Patient seen and examined. No new events except as noted.     SUBJECTIVE/ROS:  No chest pain, dyspnea, palpitation, or dizziness.     MEDICATIONS:  MEDICATIONS  (STANDING):  acetaminophen     Tablet .. 975 milliGRAM(s) Oral every 6 hours  aspirin  chewable 81 milliGRAM(s) Oral daily  atorvastatin 40 milliGRAM(s) Oral at bedtime  budesonide  80 MICROgram(s)/formoterol 4.5 MICROgram(s) Inhaler 2 Puff(s) Inhalation two times a day  heparin   Injectable 5000 Unit(s) SubCutaneous every 8 hours  influenza  Vaccine (HIGH DOSE) 0.7 milliLiter(s) IntraMuscular once  tiotropium 2.5 MICROgram(s) Inhaler 2 Puff(s) Inhalation daily      PHYSICAL EXAM:  T(C): 36.7 (12-04-22 @ 05:28), Max: 36.7 (12-03-22 @ 13:11)  HR: 62 (12-04-22 @ 05:28) (62 - 80)  BP: 135/69 (12-04-22 @ 05:28) (96/53 - 135/69)  RR: 18 (12-04-22 @ 05:28) (17 - 18)  SpO2: 95% (12-04-22 @ 05:28) (93% - 96%)  Wt(kg): --  I&O's Summary    03 Dec 2022 07:01  -  04 Dec 2022 07:00  --------------------------------------------------------  IN: 1040 mL / OUT: 1825 mL / NET: -785 mL            JVP: Normal  Neck: supple  Lung: clear   CV: S1 S2 , Murmur:  Abd: soft  Ext: No edema  neuro: Awake / alert  Psych: flat affect  Skin: normal``    LABS/DATA:    CARDIAC MARKERS:                                11.4   9.87  )-----------( 141      ( 04 Dec 2022 07:06 )             35.3     12-04    137  |  106  |  11  ----------------------------<  108<H>  3.9   |  22  |  0.78    Ca    7.9<L>      04 Dec 2022 07:11  Phos  2.8     12-04  Mg     1.9     12-04      proBNP:   Lipid Profile:   HgA1c:   TSH:     TELE:  EKG:

## 2022-12-05 ENCOUNTER — TRANSCRIPTION ENCOUNTER (OUTPATIENT)
Age: 77
End: 2022-12-05

## 2022-12-05 LAB
ANION GAP SERPL CALC-SCNC: 8 MMOL/L — SIGNIFICANT CHANGE UP (ref 5–17)
BUN SERPL-MCNC: 14 MG/DL — SIGNIFICANT CHANGE UP (ref 7–23)
CALCIUM SERPL-MCNC: 8.5 MG/DL — SIGNIFICANT CHANGE UP (ref 8.4–10.5)
CHLORIDE SERPL-SCNC: 105 MMOL/L — SIGNIFICANT CHANGE UP (ref 96–108)
CO2 SERPL-SCNC: 26 MMOL/L — SIGNIFICANT CHANGE UP (ref 22–31)
CREAT SERPL-MCNC: 0.81 MG/DL — SIGNIFICANT CHANGE UP (ref 0.5–1.3)
EGFR: 76 ML/MIN/1.73M2 — SIGNIFICANT CHANGE UP
GLUCOSE SERPL-MCNC: 98 MG/DL — SIGNIFICANT CHANGE UP (ref 70–99)
HCT VFR BLD CALC: 36.9 % — SIGNIFICANT CHANGE UP (ref 34.5–45)
HGB BLD-MCNC: 11.5 G/DL — SIGNIFICANT CHANGE UP (ref 11.5–15.5)
MAGNESIUM SERPL-MCNC: 1.9 MG/DL — SIGNIFICANT CHANGE UP (ref 1.6–2.6)
MCHC RBC-ENTMCNC: 29 PG — SIGNIFICANT CHANGE UP (ref 27–34)
MCHC RBC-ENTMCNC: 31.2 GM/DL — LOW (ref 32–36)
MCV RBC AUTO: 92.9 FL — SIGNIFICANT CHANGE UP (ref 80–100)
NRBC # BLD: 0 /100 WBCS — SIGNIFICANT CHANGE UP (ref 0–0)
PHOSPHATE SERPL-MCNC: 3.2 MG/DL — SIGNIFICANT CHANGE UP (ref 2.5–4.5)
PLATELET # BLD AUTO: 147 K/UL — LOW (ref 150–400)
POTASSIUM SERPL-MCNC: 4.1 MMOL/L — SIGNIFICANT CHANGE UP (ref 3.5–5.3)
POTASSIUM SERPL-SCNC: 4.1 MMOL/L — SIGNIFICANT CHANGE UP (ref 3.5–5.3)
RBC # BLD: 3.97 M/UL — SIGNIFICANT CHANGE UP (ref 3.8–5.2)
RBC # FLD: 17.3 % — HIGH (ref 10.3–14.5)
SARS-COV-2 RNA SPEC QL NAA+PROBE: SIGNIFICANT CHANGE UP
SODIUM SERPL-SCNC: 139 MMOL/L — SIGNIFICANT CHANGE UP (ref 135–145)
SURGICAL PATHOLOGY STUDY: SIGNIFICANT CHANGE UP
WBC # BLD: 8.05 K/UL — SIGNIFICANT CHANGE UP (ref 3.8–10.5)
WBC # FLD AUTO: 8.05 K/UL — SIGNIFICANT CHANGE UP (ref 3.8–10.5)

## 2022-12-05 RX ORDER — ACETAMINOPHEN 500 MG
3 TABLET ORAL
Qty: 0 | Refills: 0 | DISCHARGE
Start: 2022-12-05

## 2022-12-05 RX ORDER — TAMSULOSIN HYDROCHLORIDE 0.4 MG/1
0.4 CAPSULE ORAL AT BEDTIME
Refills: 0 | Status: DISCONTINUED | OUTPATIENT
Start: 2022-12-05 | End: 2022-12-06

## 2022-12-05 RX ORDER — OXYCODONE HYDROCHLORIDE 5 MG/1
1 TABLET ORAL
Qty: 0 | Refills: 0 | DISCHARGE
Start: 2022-12-05

## 2022-12-05 RX ORDER — PHENAZOPYRIDINE HCL 100 MG
100 TABLET ORAL ONCE
Refills: 0 | Status: COMPLETED | OUTPATIENT
Start: 2022-12-05 | End: 2022-12-05

## 2022-12-05 RX ADMIN — OXYCODONE HYDROCHLORIDE 10 MILLIGRAM(S): 5 TABLET ORAL at 08:19

## 2022-12-05 RX ADMIN — OXYCODONE HYDROCHLORIDE 10 MILLIGRAM(S): 5 TABLET ORAL at 22:03

## 2022-12-05 RX ADMIN — HEPARIN SODIUM 5000 UNIT(S): 5000 INJECTION INTRAVENOUS; SUBCUTANEOUS at 13:30

## 2022-12-05 RX ADMIN — Medication 81 MILLIGRAM(S): at 11:14

## 2022-12-05 RX ADMIN — Medication 975 MILLIGRAM(S): at 11:12

## 2022-12-05 RX ADMIN — OXYCODONE HYDROCHLORIDE 10 MILLIGRAM(S): 5 TABLET ORAL at 16:30

## 2022-12-05 RX ADMIN — TIOTROPIUM BROMIDE 2 PUFF(S): 18 CAPSULE ORAL; RESPIRATORY (INHALATION) at 11:10

## 2022-12-05 RX ADMIN — HEPARIN SODIUM 5000 UNIT(S): 5000 INJECTION INTRAVENOUS; SUBCUTANEOUS at 21:51

## 2022-12-05 RX ADMIN — TAMSULOSIN HYDROCHLORIDE 0.4 MILLIGRAM(S): 0.4 CAPSULE ORAL at 21:51

## 2022-12-05 RX ADMIN — OXYCODONE HYDROCHLORIDE 10 MILLIGRAM(S): 5 TABLET ORAL at 15:37

## 2022-12-05 RX ADMIN — Medication 975 MILLIGRAM(S): at 17:04

## 2022-12-05 RX ADMIN — HEPARIN SODIUM 5000 UNIT(S): 5000 INJECTION INTRAVENOUS; SUBCUTANEOUS at 05:12

## 2022-12-05 RX ADMIN — BUDESONIDE AND FORMOTEROL FUMARATE DIHYDRATE 2 PUFF(S): 160; 4.5 AEROSOL RESPIRATORY (INHALATION) at 05:12

## 2022-12-05 RX ADMIN — OXYCODONE HYDROCHLORIDE 10 MILLIGRAM(S): 5 TABLET ORAL at 23:03

## 2022-12-05 RX ADMIN — Medication 100 MILLIGRAM(S): at 10:08

## 2022-12-05 RX ADMIN — BUDESONIDE AND FORMOTEROL FUMARATE DIHYDRATE 2 PUFF(S): 160; 4.5 AEROSOL RESPIRATORY (INHALATION) at 17:05

## 2022-12-05 RX ADMIN — Medication 975 MILLIGRAM(S): at 12:00

## 2022-12-05 RX ADMIN — Medication 975 MILLIGRAM(S): at 05:11

## 2022-12-05 RX ADMIN — Medication 975 MILLIGRAM(S): at 06:11

## 2022-12-05 RX ADMIN — OXYCODONE HYDROCHLORIDE 10 MILLIGRAM(S): 5 TABLET ORAL at 09:15

## 2022-12-05 RX ADMIN — ATORVASTATIN CALCIUM 40 MILLIGRAM(S): 80 TABLET, FILM COATED ORAL at 21:50

## 2022-12-05 NOTE — PROGRESS NOTE ADULT - SUBJECTIVE AND OBJECTIVE BOX
TEAM Surgery Progress Note  Patient is a 77y old  Female who presents with a chief complaint of     SUBJECTIVE: Patient seen and examined at bedside with surgical team. No complaints; passed TOV overnight after x1 straight cath. Aquacell removed and exchanged for gauze and tape.     REVIEW OF SYSTEMS:  Constitutional: No fevers or chills. No malaise or weakness.  EENT: No vision changes. No ear pain. No nasal congestion or rhinitis. No throat pain or dysphagia.  Respiratory: No cough, wheezing, or SOB. No hemoptysis.  Cardiovascular: No chest pain or palpitations.  Gastrointestinal: No abdominal pain. No nausea, vomiting, diarrhea or constipation. No hematochezia. No melena.  Genitourinary: No dysuria, hematuria, or frequency.  Neurologic: No numbness or tingling. No weakness.  Skin: No rashes or pruritus.     OBJECTIVE:    Vital Signs Last 24 Hrs  T(C): 36.8 (05 Dec 2022 05:09), Max: 36.8 (05 Dec 2022 05:09)  T(F): 98.2 (05 Dec 2022 05:09), Max: 98.2 (05 Dec 2022 05:09)  HR: 60 (05 Dec 2022 05:09) (52 - 69)  BP: 122/79 (05 Dec 2022 05:09) (103/55 - 129/72)  BP(mean): --  RR: 18 (05 Dec 2022 05:09) (18 - 18)  SpO2: 97% (05 Dec 2022 05:09) (94% - 97%)    Parameters below as of 05 Dec 2022 05:09  Patient On (Oxygen Delivery Method): room air    I&O's Detail    04 Dec 2022 07:01  -  05 Dec 2022 07:00  --------------------------------------------------------  IN:    Oral Fluid: 1000 mL  Total IN: 1000 mL    OUT:    Indwelling Catheter - Urethral (mL): 500 mL    Voided (mL): 150 mL  Total OUT: 650 mL    Total NET: 350 mL      MEDICATIONS  (STANDING):  acetaminophen     Tablet .. 975 milliGRAM(s) Oral every 6 hours  aspirin  chewable 81 milliGRAM(s) Oral daily  atorvastatin 40 milliGRAM(s) Oral at bedtime  budesonide  80 MICROgram(s)/formoterol 4.5 MICROgram(s) Inhaler 2 Puff(s) Inhalation two times a day  heparin   Injectable 5000 Unit(s) SubCutaneous every 8 hours  influenza  Vaccine (HIGH DOSE) 0.7 milliLiter(s) IntraMuscular once  tiotropium 2.5 MICROgram(s) Inhaler 2 Puff(s) Inhalation daily    MEDICATIONS  (PRN):  ondansetron Injectable 4 milliGRAM(s) IV Push once PRN Nausea and/or Vomiting  oxyCODONE    IR 5 milliGRAM(s) Oral every 4 hours PRN Moderate Pain (4 - 6)  oxyCODONE    IR 10 milliGRAM(s) Oral every 4 hours PRN Severe Pain (7 - 10)      PHYSICAL EXAM:  General: NAD, resting comfortably in bed  Chest: L axillary cutdown site with aquacel in place, no active oozing, aquacel removed and replaced with gauze and tape  - hematoma noted over lateral inferior breast with mild tenderness and firmness of inferior aspect, improved from prior exam  Extremities: b/l groin sites with aquacel c/d/i, soft, no ecchymosis or evidence of hematoma, aquacel removed on rounds and replaced with gauze and tape.   - R palpable DP/PT, + L DP/PT signals    LABS:                        11.4   9.87  )-----------( 141      ( 04 Dec 2022 07:06 )             35.3     12-04    137  |  106  |  11  ----------------------------<  108<H>  3.9   |  22  |  0.78    Ca    7.9<L>      04 Dec 2022 07:11  Phos  2.8     12-04  Mg     1.9     12-04          Urinalysis Basic - ( 04 Dec 2022 23:21 )    Color: Light Yellow / Appearance: Clear / S.012 / pH: x  Gluc: x / Ketone: Negative  / Bili: Negative / Urobili: Negative   Blood: x / Protein: Negative / Nitrite: Negative   Leuk Esterase: Moderate / RBC: 5 /hpf / WBC 18 /HPF   Sq Epi: x / Non Sq Epi: 16 /hpf / Bacteria: Negative          IMAGING:

## 2022-12-05 NOTE — DISCHARGE NOTE PROVIDER - PROVIDER TOKENS
PROVIDER:[TOKEN:[[id number]],FOLLOWUP:[2 weeks]],PROVIDER:[TOKEN:[[id number]],FOLLOWUP:[2 weeks]]

## 2022-12-05 NOTE — DISCHARGE NOTE PROVIDER - CARE PROVIDER_API CALL
Florentino Lucas)  Surgery; Vascular Surgery  [address], [address]  [place] [address]  Phone: [phone]  Fax: [phone]  Follow Up Time: 2 weeks    Jez Guzman  CARDIOVASCULAR DISEASE  [address] [facility], [facility] [address]  [place] [address]  Phone: [phone]  Fax: [phone]  Follow Up Time: 2 weeks

## 2022-12-05 NOTE — DISCHARGE NOTE PROVIDER - HOSPITAL COURSE
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 Procedure aborted on 11/9/2022 now planned for right Axillary BI femoral bypass on 11/30/2022. ON 11/30, pt underwent Endarterectomy of both common femoral arteries and axillary to bifemoral bypass with graft Pt. tolerated procedure well and was transferred to the recovery room and then the floor without incidence.  Pt. was mainly managed for postoperative pain, wound care, as well as close monitoring of fluid resuscitation and neurovascular checks. Postop H/H 9.4/29 from 11.5/37- monitor hematoma, borders marked.  POD2, moore dc and passed TOV overnight after x1 straight cath. Aquacell removed and exchanged for gauze and tape. POD3, Patient was hypotensive yesterday afternoon w/ SBPs in 80s. EKG stable, trops WNL. Patient was asymptomatic except for fatigue, L breast hematoma stable. S/p 500cc bolus x2 and 2u pRBC. Home diltiazem held. SBP improved to 100s overnight. Post-transfusion H/H 10.8/33.6 (7.8/24.8).  POD4/5 patient stable.  At the time of discharge, the patient was hemodynamically stable, was tolerating PO diet, was voiding urine and passing stool, was ambulating, and was comfortable with adequate pain control. The patient was instructed to follow up with Dr. Perkins and PMD within 1-2 weeks after discharge from the hospital. The patient/family felt comfortable with discharge. The patient was discharged to rehab. The patient had no other issues. 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 Procedure aborted on 11/9/2022 now planned for right Axillary BI femoral bypass on 11/30/2022. ON 11/30, pt underwent Endarterectomy of both common femoral arteries and axillary to bifemoral bypass with graft Pt. tolerated procedure well and was transferred to the recovery room and then the floor without incidence.  Pt. was mainly managed for postoperative pain, wound care, as well as close monitoring of fluid resuscitation and neurovascular checks. Postop H/H 9.4/29 from 11.5/37- monitor hematoma, borders marked.  POD2, moore dc and passed TOV overnight after x1 straight cath. Aquacell removed and exchanged for gauze and tape. POD3, Patient was hypotensive yesterday afternoon w/ SBPs in 80s. EKG stable, trops WNL. Patient was asymptomatic except for fatigue, L breast hematoma stable. S/p 500cc bolus x2 and 2u pRBC. Home diltiazem held. SBP improved to 100s overnight. Post-transfusion H/H 10.8/33.6 (7.8/24.8).  POD4/5 patient stable.  At the time of discharge, the patient was hemodynamically stable, was tolerating PO diet, was voiding urine and passing stool, was ambulating, and was comfortable with adequate pain control. The patient was instructed to follow up with Dr. Lucas and [place] and PMD within 1-2 weeks after discharge from the hospital. The patient/family felt comfortable with discharge. The patient was discharged to rehab. The patient had no other issues.         **Addendum: 2u pRBC given on POD2 for symptomatic anemia due to acute posthemorrhagic anemia

## 2022-12-05 NOTE — PROGRESS NOTE ADULT - SUBJECTIVE AND OBJECTIVE BOX
DATE OF SERVICE: 12-05-22 @ 08:19    Subjective: Patient seen and examined. No new events except as noted.     SUBJECTIVE/ROS:  nad      MEDICATIONS:  MEDICATIONS  (STANDING):  acetaminophen     Tablet .. 975 milliGRAM(s) Oral every 6 hours  aspirin  chewable 81 milliGRAM(s) Oral daily  atorvastatin 40 milliGRAM(s) Oral at bedtime  budesonide  80 MICROgram(s)/formoterol 4.5 MICROgram(s) Inhaler 2 Puff(s) Inhalation two times a day  heparin   Injectable 5000 Unit(s) SubCutaneous every 8 hours  influenza  Vaccine (HIGH DOSE) 0.7 milliLiter(s) IntraMuscular once  tiotropium 2.5 MICROgram(s) Inhaler 2 Puff(s) Inhalation daily      PHYSICAL EXAM:  T(C): 36.8 (12-05-22 @ 05:09), Max: 36.8 (12-05-22 @ 05:09)  HR: 60 (12-05-22 @ 05:09) (52 - 69)  BP: 122/79 (12-05-22 @ 05:09) (103/55 - 129/72)  RR: 18 (12-05-22 @ 05:09) (18 - 18)  SpO2: 97% (12-05-22 @ 05:09) (94% - 97%)  Wt(kg): --  I&O's Summary    04 Dec 2022 07:01  -  05 Dec 2022 07:00  --------------------------------------------------------  IN: 1000 mL / OUT: 650 mL / NET: 350 mL            JVP: Normal  Neck: supple  Lung: clear   CV: S1 S2 , Murmur:  Abd: soft  Ext: No edema  neuro: Awake / alert  Psych: flat affect  Skin: normal``    LABS/DATA:    CARDIAC MARKERS:                                11.5   8.05  )-----------( 147      ( 05 Dec 2022 07:16 )             36.9     12-05    139  |  105  |  14  ----------------------------<  98  4.1   |  26  |  0.81    Ca    8.5      05 Dec 2022 07:16  Phos  3.2     12-05  Mg     1.9     12-05      proBNP:   Lipid Profile:   HgA1c:   TSH:     TELE:  EKG:

## 2022-12-05 NOTE — DISCHARGE NOTE PROVIDER - NSDCMRMEDTOKEN_GEN_ALL_CORE_FT
Aspir 81 oral delayed release tablet: 1 tab(s) orally once a day  atorvastatin 40 mg oral tablet: 1 tab(s) orally once a day (at bedtime)  cilostazol 100 mg oral tablet: 1 tab(s) orally 2 times a day  dilTIAZem 30 mg oral tablet: 1 tab(s) orally 2 times a day  gabapentin 300 mg oral tablet: 1 tab(s) orally 2 times a day  Imodium 2 mg oral capsule: 1 cap(s) orally , As Needed  tamsulosin 0.4 mg oral capsule: 1 cap(s) orally once a day  Trelegy Ellipta 100 mcg-62.5 mcg-25 mcg/inh inhalation powder: 1 puff(s) inhaled once a day   acetaminophen 325 mg oral tablet: 3 tab(s) orally every 6 hours  Aspir 81 oral delayed release tablet: 1 tab(s) orally once a day  atorvastatin 40 mg oral tablet: 1 tab(s) orally once a day (at bedtime)  cilostazol 100 mg oral tablet: 1 tab(s) orally 2 times a day  dilTIAZem 30 mg oral tablet: 1 tab(s) orally 2 times a day  gabapentin 300 mg oral tablet: 1 tab(s) orally 2 times a day  Imodium 2 mg oral capsule: 1 cap(s) orally , As Needed  oxyCODONE 5 mg oral tablet: 1 tab(s) orally every 4 hours, As needed, Moderate Pain (4 - 6)  tamsulosin 0.4 mg oral capsule: 1 cap(s) orally once a day  Trelegy Ellipta 100 mcg-62.5 mcg-25 mcg/inh inhalation powder: 1 puff(s) inhaled once a day   acetaminophen 325 mg oral tablet: 3 tab(s) orally every 6 hours  Aspir 81 oral delayed release tablet: 1 tab(s) orally once a day  atorvastatin 40 mg oral tablet: 1 tab(s) orally once a day (at bedtime)  dilTIAZem 30 mg oral tablet: 1 tab(s) orally 2 times a day  gabapentin 300 mg oral tablet: 1 tab(s) orally 2 times a day  Imodium 2 mg oral capsule: 1 cap(s) orally , As Needed  oxyCODONE 5 mg oral tablet: 1 tab(s) orally every 4 hours, As needed, Moderate Pain (4 - 6)  tamsulosin 0.4 mg oral capsule: 1 cap(s) orally once a day  Trelegy Ellipta 100 mcg-62.5 mcg-25 mcg/inh inhalation powder: 1 puff(s) inhaled once a day

## 2022-12-06 ENCOUNTER — TRANSCRIPTION ENCOUNTER (OUTPATIENT)
Age: 77
End: 2022-12-06

## 2022-12-06 VITALS
SYSTOLIC BLOOD PRESSURE: 104 MMHG | HEART RATE: 67 BPM | OXYGEN SATURATION: 93 % | DIASTOLIC BLOOD PRESSURE: 69 MMHG | RESPIRATION RATE: 16 BRPM | TEMPERATURE: 98 F

## 2022-12-06 PROBLEM — Z87.898 PERSONAL HISTORY OF OTHER SPECIFIED CONDITIONS: Chronic | Status: ACTIVE | Noted: 2022-11-25

## 2022-12-06 PROBLEM — I73.9 PERIPHERAL VASCULAR DISEASE, UNSPECIFIED: Chronic | Status: ACTIVE | Noted: 2022-11-25

## 2022-12-06 PROBLEM — J44.9 CHRONIC OBSTRUCTIVE PULMONARY DISEASE, UNSPECIFIED: Chronic | Status: ACTIVE | Noted: 2021-07-09

## 2022-12-06 PROBLEM — Z86.79 PERSONAL HISTORY OF OTHER DISEASES OF THE CIRCULATORY SYSTEM: Chronic | Status: ACTIVE | Noted: 2022-11-25

## 2022-12-06 PROCEDURE — 84484 ASSAY OF TROPONIN QUANT: CPT

## 2022-12-06 PROCEDURE — 97162 PT EVAL MOD COMPLEX 30 MIN: CPT

## 2022-12-06 PROCEDURE — P9016: CPT

## 2022-12-06 PROCEDURE — 86900 BLOOD TYPING SEROLOGIC ABO: CPT

## 2022-12-06 PROCEDURE — 86923 COMPATIBILITY TEST ELECTRIC: CPT

## 2022-12-06 PROCEDURE — 97530 THERAPEUTIC ACTIVITIES: CPT

## 2022-12-06 PROCEDURE — 88311 DECALCIFY TISSUE: CPT

## 2022-12-06 PROCEDURE — 86901 BLOOD TYPING SEROLOGIC RH(D): CPT

## 2022-12-06 PROCEDURE — 82962 GLUCOSE BLOOD TEST: CPT

## 2022-12-06 PROCEDURE — 36415 COLL VENOUS BLD VENIPUNCTURE: CPT

## 2022-12-06 PROCEDURE — 82803 BLOOD GASES ANY COMBINATION: CPT

## 2022-12-06 PROCEDURE — 93005 ELECTROCARDIOGRAM TRACING: CPT

## 2022-12-06 PROCEDURE — 83735 ASSAY OF MAGNESIUM: CPT

## 2022-12-06 PROCEDURE — 86850 RBC ANTIBODY SCREEN: CPT

## 2022-12-06 PROCEDURE — 85027 COMPLETE CBC AUTOMATED: CPT

## 2022-12-06 PROCEDURE — 88304 TISSUE EXAM BY PATHOLOGIST: CPT

## 2022-12-06 PROCEDURE — 83605 ASSAY OF LACTIC ACID: CPT

## 2022-12-06 PROCEDURE — 82435 ASSAY OF BLOOD CHLORIDE: CPT

## 2022-12-06 PROCEDURE — 82565 ASSAY OF CREATININE: CPT

## 2022-12-06 PROCEDURE — 84295 ASSAY OF SERUM SODIUM: CPT

## 2022-12-06 PROCEDURE — 85018 HEMOGLOBIN: CPT

## 2022-12-06 PROCEDURE — C1769: CPT

## 2022-12-06 PROCEDURE — 94640 AIRWAY INHALATION TREATMENT: CPT

## 2022-12-06 PROCEDURE — 87635 SARS-COV-2 COVID-19 AMP PRB: CPT

## 2022-12-06 PROCEDURE — U0005: CPT

## 2022-12-06 PROCEDURE — P9047: CPT

## 2022-12-06 PROCEDURE — 36430 TRANSFUSION BLD/BLD COMPNT: CPT

## 2022-12-06 PROCEDURE — 82330 ASSAY OF CALCIUM: CPT

## 2022-12-06 PROCEDURE — 85014 HEMATOCRIT: CPT

## 2022-12-06 PROCEDURE — C1768: CPT

## 2022-12-06 PROCEDURE — 81001 URINALYSIS AUTO W/SCOPE: CPT

## 2022-12-06 PROCEDURE — 84132 ASSAY OF SERUM POTASSIUM: CPT

## 2022-12-06 PROCEDURE — 84100 ASSAY OF PHOSPHORUS: CPT

## 2022-12-06 PROCEDURE — 71045 X-RAY EXAM CHEST 1 VIEW: CPT

## 2022-12-06 PROCEDURE — 82947 ASSAY GLUCOSE BLOOD QUANT: CPT

## 2022-12-06 PROCEDURE — 80048 BASIC METABOLIC PNL TOTAL CA: CPT

## 2022-12-06 PROCEDURE — U0003: CPT

## 2022-12-06 PROCEDURE — C1889: CPT

## 2022-12-06 PROCEDURE — C9803: CPT

## 2022-12-06 PROCEDURE — C9399: CPT

## 2022-12-06 RX ORDER — PHENAZOPYRIDINE HCL 100 MG
100 TABLET ORAL ONCE
Refills: 0 | Status: COMPLETED | OUTPATIENT
Start: 2022-12-06 | End: 2022-12-06

## 2022-12-06 RX ORDER — BNT162B2 ORIGINAL AND OMICRON BA.4/BA.5 .1125; .1125 MG/2.25ML; MG/2.25ML
0.3 INJECTION, SUSPENSION INTRAMUSCULAR ONCE
Refills: 0 | Status: COMPLETED | OUTPATIENT
Start: 2022-12-06 | End: 2022-12-06

## 2022-12-06 RX ORDER — CILOSTAZOL 100 MG/1
1 TABLET ORAL
Qty: 0 | Refills: 0 | DISCHARGE

## 2022-12-06 RX ADMIN — Medication 975 MILLIGRAM(S): at 11:13

## 2022-12-06 RX ADMIN — Medication 81 MILLIGRAM(S): at 11:13

## 2022-12-06 RX ADMIN — BUDESONIDE AND FORMOTEROL FUMARATE DIHYDRATE 2 PUFF(S): 160; 4.5 AEROSOL RESPIRATORY (INHALATION) at 05:15

## 2022-12-06 RX ADMIN — HEPARIN SODIUM 5000 UNIT(S): 5000 INJECTION INTRAVENOUS; SUBCUTANEOUS at 14:15

## 2022-12-06 RX ADMIN — HEPARIN SODIUM 5000 UNIT(S): 5000 INJECTION INTRAVENOUS; SUBCUTANEOUS at 05:15

## 2022-12-06 RX ADMIN — OXYCODONE HYDROCHLORIDE 10 MILLIGRAM(S): 5 TABLET ORAL at 09:47

## 2022-12-06 RX ADMIN — TIOTROPIUM BROMIDE 2 PUFF(S): 18 CAPSULE ORAL; RESPIRATORY (INHALATION) at 11:13

## 2022-12-06 RX ADMIN — Medication 975 MILLIGRAM(S): at 12:15

## 2022-12-06 RX ADMIN — BNT162B2 ORIGINAL AND OMICRON BA.4/BA.5 0.3 MILLILITER(S): .1125; .1125 INJECTION, SUSPENSION INTRAMUSCULAR at 10:42

## 2022-12-06 RX ADMIN — Medication 975 MILLIGRAM(S): at 06:15

## 2022-12-06 RX ADMIN — Medication 975 MILLIGRAM(S): at 05:15

## 2022-12-06 RX ADMIN — OXYCODONE HYDROCHLORIDE 10 MILLIGRAM(S): 5 TABLET ORAL at 16:46

## 2022-12-06 RX ADMIN — Medication 100 MILLIGRAM(S): at 11:13

## 2022-12-06 RX ADMIN — OXYCODONE HYDROCHLORIDE 10 MILLIGRAM(S): 5 TABLET ORAL at 10:45

## 2022-12-06 NOTE — PROGRESS NOTE ADULT - ASSESSMENT
76 y/o Female with PMH former smoker, CAD s/p CABGx2 (7/2021), HLD, HTN, GERD, COPD/Emphysema, PAD w/ bilateral rest pain, s/p left CEA (8/2022), attempted left femoral endarterectomy, possible femoral bypass on 11/9/2022 (L CFA accessed via US however wire was unable to be threaded beyond proximal calcifications/ Repeated attempt at R CFA without success due to proximal stenosis. Procedure aborted on 11/9 now s/p B/L Femoral endarterectomies, creation of L Axillary to bifemoral bypass using graft 11/30.    Plan:  - monitor vitals  - monitor hematoma  - regular diet  - pain control  - f/u PT recs  - encourage OOB/ambulation  - DVT ppx  - wean O2, not on home O2      Vascular Surgery  [id number]  
78 y/o Female with PMH former smoker, CAD s/p CABGx2 (7/2021), HLD, HTN, GERD, COPD/Emphysema, PAD w/ bilateral rest pain, s/p left CEA (8/2022), attempted left femoral endarterectomy, possible femoral bypass on 11/9/2022 (L CFA accessed via US however wire was unable to be threaded beyond proximal calcifications/ Repeated attempt at R CFA without success due to proximal stenosis. Procedure aborted on 11/9 now s/p B/L Femoral endarterectomies, creation of L Axillary to bifemoral bypass using graft 11/30.    Plan:  - monitor vitals  - monitor hematoma  - regular diet  - pain control  - encourage OOB/ambulation  - DVT ppx  - Dispo planning: Rehab hopefully today    Vascular Surgery  [id number]
PAD  s/p  b/l femoral endarterectomies and left axillary bifemoral bypass with graft.   fu with vascular surgery     Carotid occlusion   s/p CEA   asa  statin     anemia  much improved     CAD  asa  statin   s/p CABG 7/2021    COPD  inhalers       
76 y/o Female with PMH former smoker, CAD s/p CABGx2 (7/2021), HLD, HTN, GERD, COPD/Emphysema, PAD w/ bilateral rest pain, s/p left CEA (8/2022), attempted left femoral endarterectomy, possible femoral bypass on 11/9/2022 (L CFA accessed via US however wire was unable to be threaded beyond proximal calcifications/ Repeated attempt at R CFA without success due to proximal stenosis. Procedure aborted on 11/9 now s/p B/L Femoral endarterectomies, creation of L Axillary to bifemoral bypass using graft 11/30.    Plan:  - Passed TOV  - monitor vitals  - monitor hematoma  - regular diet  - pain control  - f/u PT recs  - encourage OOB/ambulation  - DVT ppx  - wean O2, not on home O2  -Dispo planning    Vascular Surgery  [id number]
76 y/o Female with PMH former smoker, CAD s/p CABGx2 (7/2021), HLD, HTN, GERD, COPD/Emphysema, PAD w/ bilateral rest pain, s/p left CEA (8/2022), attempted left femoral endarterectomy, possible femoral bypass on 11/9/2022 (L CFA accessed via US however wire was unable to be threaded beyond proximal calcifications/ Repeated attempt at R CFA without success due to proximal stenosis. Procedure aborted on 11/9 now s/p B/L Femoral endarterectomies, creation of L Axillary to bifemoral bypass using graft 11/30.    Plan:  - monitor vitals  - monitor hematoma  - regular diet  - pain control  - f/u PT recs  - encourage OOB/ambulation  - DVT ppx  - wean O2, not on home O2      Vascular Surgery  [id number]
PAD  s/p  b/l femoral endarterectomies and left axillary bifemoral bypass with graft.   fu with vascular surgery     Carotid occlusion   s/p CEA   asa  statin     anemia  has hematoma in left axillary area  Monitor hemoglobin, transfuse as needed.  plan as per surgery     CAD  asa  statin   s/p CABG 7/2021    COPD  inhalers      Advanced care planning was discussed with patient and family.  Risks, benefits and alternatives of the cardiac treatments and medical therapy including procedures were discussed in detail and all questions were answered. Importance of compliance with medical therapy and lifestyle modification to improve cardiovascular health were addressed. Appropriate forms and patient educational materials were reviewed. 30 minutes face to face spent.    
PAD  s/p  b/l femoral endarterectomies and left axillary bifemoral bypass with graft.   fu with vascular surgery     Carotid occlusion   s/p CEA   asa  statin     anemia  stable  s/p transfusion     CAD  asa  statin   s/p CABG 7/2021    COPD  inhalers       
Patient is a 77y old Female s/p b/l femoral endarterectomies and left axillary bifemoral bypass with graft. Postop H/H 9.4/29 from 11.5/37.    PLAN:  - monitor hematoma, borders marked  - ancef x24hrs  - pain control   - continue moore, I/Os. To be removed tomorrow  - OOB with assistance  - DVT ppx w/ SQH  - Dispo: PT today, likely home tomorrow      Vascular Surgery  [id number].
PAD  s/p  b/l femoral endarterectomies and left axillary bifemoral bypass with graft.   fu with vascular surgery     Carotid occlusion   s/p CEA   asa  statin     anemia  much improved     CAD  asa  statin   s/p CABG 7/2021    COPD  inhalers       
PAD  s/p  b/l femoral endarterectomies and left axillary bifemoral bypass with graft.   fu with vascular surgery     Carotid occlusion   s/p CEA   asa  statin     anemia  much improved     CAD  asa  statin   s/p CABG 7/2021    COPD  inhalers

## 2022-12-06 NOTE — PROGRESS NOTE ADULT - SUBJECTIVE AND OBJECTIVE BOX
SURGERY DAILY PROGRESS NOTE:     SUBJECTIVE/ROS: Patient feels well. Reports pain is getting better and controlled.   Denies nausea, vomiting, chest pain, shortness of breath.      MEDICATIONS  (STANDING):  acetaminophen     Tablet .. 975 milliGRAM(s) Oral every 6 hours  aspirin  chewable 81 milliGRAM(s) Oral daily  atorvastatin 40 milliGRAM(s) Oral at bedtime  budesonide  80 MICROgram(s)/formoterol 4.5 MICROgram(s) Inhaler 2 Puff(s) Inhalation two times a day  heparin   Injectable 5000 Unit(s) SubCutaneous every 8 hours  influenza  Vaccine (HIGH DOSE) 0.7 milliLiter(s) IntraMuscular once  tamsulosin 0.4 milliGRAM(s) Oral at bedtime  tiotropium 2.5 MICROgram(s) Inhaler 2 Puff(s) Inhalation daily    MEDICATIONS  (PRN):  ondansetron Injectable 4 milliGRAM(s) IV Push once PRN Nausea and/or Vomiting  oxyCODONE    IR 5 milliGRAM(s) Oral every 4 hours PRN Moderate Pain (4 - 6)  oxyCODONE    IR 10 milliGRAM(s) Oral every 4 hours PRN Severe Pain (7 - 10)      OBJECTIVE:  Vital Signs Last 24 Hrs  T(C): 36.6 (06 Dec 2022 05:44), Max: 36.8 (05 Dec 2022 09:19)  T(F): 97.9 (06 Dec 2022 05:44), Max: 98.2 (05 Dec 2022 09:19)  HR: 60 (06 Dec 2022 05:44) (60 - 74)  BP: 102/63 (06 Dec 2022 05:44) (100/61 - 117/69)  BP(mean): --  RR: 18 (06 Dec 2022 05:44) (18 - 18)  SpO2: 93% (06 Dec 2022 05:44) (93% - 96%)    Parameters below as of 06 Dec 2022 05:44  Patient On (Oxygen Delivery Method): room air      I&O's Detail  05 Dec 2022 07:01  -  06 Dec 2022 07:00  --------------------------------------------------------  IN:    Oral Fluid: 960 mL  Total IN: 960 mL    OUT:    Voided (mL): 1325 mL  Total OUT: 1325 mL  Total NET: -365 mL      LABS:                        11.5   8.05  )-----------( 147      ( 05 Dec 2022 07:16 )             36.9     12-    139  |  105  |  14  ----------------------------<  98  4.1   |  26  |  0.81    Ca    8.5      05 Dec 2022 07:16  Phos  3.2     12  Mg     1.9           Urinalysis Basic - ( 04 Dec 2022 23:21 )  Color: Light Yellow / Appearance: Clear / S.012 / pH: x  Gluc: x / Ketone: Negative  / Bili: Negative / Urobili: Negative   Blood: x / Protein: Negative / Nitrite: Negative   Leuk Esterase: Moderate / RBC: 5 /hpf / WBC 18 /HPF   Sq Epi: x / Non Sq Epi: 16 /hpf / Bacteria: Negative      PHYSICAL EXAM:  General: NAD, resting comfortably in bed  Chest: L axillary cutdown site with aquacel in place, no active oozing, aquacel removed and replaced with gauze and tape  - hematoma noted over lateral inferior breast with mild tenderness and firmness of inferior aspect, continuous improvement  Extremities: b/l groin sites with aquacel, c/d/i, soft, no ecchymosis or evidence of hematoma, aquacel removed on rounds and replaced with gauze and tape.   - R palpable DP/PT, + L DP/PT signals

## 2022-12-06 NOTE — DISCHARGE NOTE NURSING/CASE MANAGEMENT/SOCIAL WORK - NSDCPEFALRISK_GEN_ALL_CORE
For information on Fall & Injury Prevention, visit: [email or web address] OR  [email or web address] OR  https://www.cdc.gov/steadi/patient.html

## 2022-12-06 NOTE — DISCHARGE NOTE NURSING/CASE MANAGEMENT/SOCIAL WORK - NSDCVIVACCINE_GEN_ALL_CORE_FT
Pfizer-BioNTech Covid-19 Vaccine, Bivalent; 06-Dec-2022 10:42; Rigoberto Springer (RN); [employer]; [id number]   (Exp. Date: 25-Jan-2023); IntraMuscular; Deltoid Right.; 0.3 milliLiter(s);

## 2022-12-06 NOTE — PROGRESS NOTE ADULT - SUBJECTIVE AND OBJECTIVE BOX
DATE OF SERVICE: 12-06-22 @ 08:23    Subjective: Patient seen and examined. No new events except as noted.     SUBJECTIVE/ROS:  nad      MEDICATIONS:  MEDICATIONS  (STANDING):  acetaminophen     Tablet .. 975 milliGRAM(s) Oral every 6 hours  aspirin  chewable 81 milliGRAM(s) Oral daily  atorvastatin 40 milliGRAM(s) Oral at bedtime  budesonide  80 MICROgram(s)/formoterol 4.5 MICROgram(s) Inhaler 2 Puff(s) Inhalation two times a day  heparin   Injectable 5000 Unit(s) SubCutaneous every 8 hours  influenza  Vaccine (HIGH DOSE) 0.7 milliLiter(s) IntraMuscular once  tamsulosin 0.4 milliGRAM(s) Oral at bedtime  tiotropium 2.5 MICROgram(s) Inhaler 2 Puff(s) Inhalation daily      PHYSICAL EXAM:  T(C): 36.6 (12-06-22 @ 05:44), Max: 36.8 (12-05-22 @ 09:19)  HR: 60 (12-06-22 @ 05:44) (60 - 74)  BP: 102/63 (12-06-22 @ 05:44) (100/61 - 117/69)  RR: 18 (12-06-22 @ 05:44) (18 - 18)  SpO2: 93% (12-06-22 @ 05:44) (93% - 96%)  Wt(kg): --  I&O's Summary    05 Dec 2022 07:01  -  06 Dec 2022 07:00  --------------------------------------------------------  IN: 960 mL / OUT: 1325 mL / NET: -365 mL            JVP: Normal  Neck: supple  Lung: clear   CV: S1 S2 , Murmur:  Abd: soft  Ext: No edema  neuro: Awake / alert  Psych: flat affect    LABS/DATA:    CARDIAC MARKERS:                                11.5   8.05  )-----------( 147      ( 05 Dec 2022 07:16 )             36.9     12-05    139  |  105  |  14  ----------------------------<  98  4.1   |  26  |  0.81    Ca    8.5      05 Dec 2022 07:16  Phos  3.2     12-05  Mg     1.9     12-05      proBNP:   Lipid Profile:   HgA1c:   TSH:     TELE:  EKG:

## 2022-12-06 NOTE — PROGRESS NOTE ADULT - PROVIDER SPECIALTY LIST ADULT
Cardiology
Cardiology
Vascular Surgery
Cardiology
Cardiology
Vascular Surgery
Cardiology
Vascular Surgery
Vascular Surgery

## 2022-12-06 NOTE — DISCHARGE NOTE NURSING/CASE MANAGEMENT/SOCIAL WORK - PATIENT PORTAL LINK FT
You can access the FollowMyHealth Patient Portal offered by [facility] by registering at the following website: [email or web address]. By joining [employer]’s FollowMyHealth portal, you will also be able to view your health information using other applications (apps) compatible with our system.

## 2023-01-13 NOTE — PATIENT PROFILE ADULT - HEALTH LITERACY
Anesthesia Evaluation     Patient summary reviewed and Nursing notes reviewed   NPO Solid Status: > 8 hours  NPO Liquid Status: > 6 hours           Airway   Mallampati: II  TM distance: >3 FB  Neck ROM: full  No difficulty expected  Comment: Full beard  Dental    (+) edentulous    Pulmonary - normal exam   Cardiovascular - normal exam    ECG reviewed  Rhythm: regular  Rate: normal    (+) hypertension 2 medications or greater,       Neuro/Psych    ROS Comment: Meningioma  GI/Hepatic/Renal/Endo    (+) obesity,  GERD,      Musculoskeletal     Abdominal   (+) obese,    Substance History      OB/GYN          Other   arthritis,        Other Comment: Gout                  Anesthesia Plan    ASA 2     general     (Art line/2 large IVs since tumor is very close to sagittal sinus)  intravenous induction     Anesthetic plan, risks, benefits, and alternatives have been provided, discussed and informed consent has been obtained with: patient.    Plan discussed with CRNA.        CODE STATUS:        no

## 2023-03-28 ENCOUNTER — APPOINTMENT (OUTPATIENT)
Dept: UROLOGY | Facility: CLINIC | Age: 78
End: 2023-03-28

## 2023-04-03 ENCOUNTER — APPOINTMENT (OUTPATIENT)
Dept: UROLOGY | Facility: CLINIC | Age: 78
End: 2023-04-03
Payer: MEDICARE

## 2023-04-03 VITALS
RESPIRATION RATE: 14 BRPM | WEIGHT: 143 LBS | SYSTOLIC BLOOD PRESSURE: 129 MMHG | DIASTOLIC BLOOD PRESSURE: 86 MMHG | HEIGHT: 62 IN | BODY MASS INDEX: 26.31 KG/M2 | HEART RATE: 73 BPM

## 2023-04-03 PROCEDURE — 99204 OFFICE O/P NEW MOD 45 MIN: CPT

## 2023-04-03 NOTE — HISTORY OF PRESENT ILLNESS
[FreeTextEntry1] : 78yo F hx recurrent UTI presents to establish care\par SHe was in [facility] and moved here recently. \par As per pt, she had a lot of workup for recurrent UTIs in [place], including cystoscopy. \par she was started on macrobid 100mg daily but prescription ran out\par She thinks she has UTI now. Reports frequency, itch, dysuria. Unable to confirm hematuria

## 2023-04-03 NOTE — PHYSICAL EXAM
[General Appearance - Well Developed] : well developed [General Appearance - Well Nourished] : well nourished [Normal Appearance] : normal appearance [Well Groomed] : well groomed [General Appearance - In No Acute Distress] : no acute distress [Edema] : no peripheral edema [Normal Station and Gait] : the gait and station were normal for the patient's age [] : no rash [No Focal Deficits] : no focal deficits [Oriented To Time, Place, And Person] : oriented to person, place, and time [Affect] : the affect was normal [Mood] : the mood was normal [Not Anxious] : not anxious

## 2023-04-03 NOTE — ASSESSMENT
[FreeTextEntry1] : Recurrent UTIs:\par start macrobid 100mg daily\par start estrace cream\par \par UTI symptoms:\par check US\par check culture

## 2023-04-05 LAB — BACTERIA UR CULT: NORMAL

## 2023-06-29 ENCOUNTER — NON-APPOINTMENT (OUTPATIENT)
Age: 78
End: 2023-06-29

## 2023-07-03 ENCOUNTER — APPOINTMENT (OUTPATIENT)
Dept: UROLOGY | Facility: CLINIC | Age: 78
End: 2023-07-03
Payer: MEDICARE

## 2023-07-03 VITALS
HEIGHT: 62 IN | WEIGHT: 144 LBS | BODY MASS INDEX: 26.5 KG/M2 | SYSTOLIC BLOOD PRESSURE: 124 MMHG | HEART RATE: 69 BPM | DIASTOLIC BLOOD PRESSURE: 81 MMHG

## 2023-07-03 DIAGNOSIS — R39.9 UNSPECIFIED SYMPTOMS AND SIGNS INVOLVING THE GENITOURINARY SYSTEM: ICD-10-CM

## 2023-07-03 PROCEDURE — 99214 OFFICE O/P EST MOD 30 MIN: CPT

## 2023-07-03 RX ORDER — NITROFURANTOIN (MONOHYDRATE/MACROCRYSTALS) 25; 75 MG/1; MG/1
100 CAPSULE ORAL
Qty: 60 | Refills: 0 | Status: ACTIVE | COMMUNITY
Start: 2023-04-03 | End: 1900-01-01

## 2023-07-03 NOTE — ASSESSMENT
[FreeTextEntry1] : Recurrent UTIs:\par c/w macrobid 100mg daily\par c/w estrace cream\par \par UTI symptoms:\par check culture, cytology\par if no infection - discussed cystoscopy\par will start bactrim while wait for culture\par f/u with renal/bladder US

## 2023-07-03 NOTE — HISTORY OF PRESENT ILLNESS
[FreeTextEntry1] : 79yo F hx recurrent UTI presents for f/u\par She states she ran out of Macrobid as pharmacy wouldn't refill\par She feel like she is having another UTI. +dysuria. \par \par SHe was in [facility] and moved here recently. \par As per pt, she had a lot of workup for recurrent UTIs in [place], including cystoscopy. \par She was prescribed macrobid daily prophylactid dose for recurrent UTI

## 2023-07-05 ENCOUNTER — NON-APPOINTMENT (OUTPATIENT)
Age: 78
End: 2023-07-05

## 2023-07-05 LAB
BACTERIA UR CULT: NORMAL
URINE CYTOLOGY: NORMAL

## 2023-07-18 NOTE — H&P PST ADULT - PEDAL EDEMA SEVERITY
Received call from DME ([employer]) stating pt will not be set up on Bipap upon d/c due to needing a ONPO to qualify him for bipap. Patient is also now refusing to be transferred to [facility] after d/c from hospital, Patient is signing out [address]. JAYLON states that pt can qualify for NIV due to hospital notes pt would just have to sign a risk and harm statement frome DME.  Please advise 2+

## 2023-08-10 ENCOUNTER — APPOINTMENT (OUTPATIENT)
Dept: UROLOGY | Facility: CLINIC | Age: 78
End: 2023-08-10

## 2023-08-25 ENCOUNTER — APPOINTMENT (OUTPATIENT)
Dept: UROLOGY | Facility: CLINIC | Age: 78
End: 2023-08-25
Payer: MEDICARE

## 2023-08-25 VITALS
WEIGHT: 155 LBS | HEART RATE: 69 BPM | HEIGHT: 62 IN | SYSTOLIC BLOOD PRESSURE: 124 MMHG | BODY MASS INDEX: 28.52 KG/M2 | DIASTOLIC BLOOD PRESSURE: 78 MMHG

## 2023-08-25 DIAGNOSIS — R39.89 OTHER SYMPTOMS AND SIGNS INVOLVING THE GENITOURINARY SYSTEM: ICD-10-CM

## 2023-08-25 PROCEDURE — 99214 OFFICE O/P EST MOD 30 MIN: CPT

## 2023-08-25 RX ORDER — SULFAMETHOXAZOLE AND TRIMETHOPRIM 800; 160 MG/1; MG/1
800-160 TABLET ORAL TWICE DAILY
Qty: 28 | Refills: 0 | Status: ACTIVE | COMMUNITY
Start: 2023-07-03 | End: 1900-01-01

## 2023-08-25 NOTE — HISTORY OF PRESENT ILLNESS
[FreeTextEntry1] : 77yo F hx recurrent UTI presents for f/u She reports difficulty emptying her bladder . She states now only small amount of urine is coming out She is continuing with her macrobid daily.   Pt had Renal/bladder US 1 month ago showing b/l renal cysts. pvr 0cc pvr today:  Reviewed bloodwork from her PCP:   SHe was in [facility] and moved here recently.  As per pt, she had a lot of workup for recurrent UTIs in [place], including cystoscopy.  She was prescribed macrobid daily prophylactid dose for recurrent UTI

## 2023-08-25 NOTE — ASSESSMENT
[FreeTextEntry1] : Recurrent UTIs: will place patient on 2 weeks of bactrim hold macrobid for now c/w estrace cream  Pressure sensation: could be OAB will schedule cystoscopy on macrobid

## 2023-08-28 LAB — BACTERIA UR CULT: NORMAL

## 2023-09-08 ENCOUNTER — APPOINTMENT (OUTPATIENT)
Dept: UROLOGY | Facility: CLINIC | Age: 78
End: 2023-09-08
Payer: MEDICARE

## 2023-09-08 VITALS
DIASTOLIC BLOOD PRESSURE: 72 MMHG | HEIGHT: 62 IN | WEIGHT: 155 LBS | BODY MASS INDEX: 28.52 KG/M2 | HEART RATE: 108 BPM | SYSTOLIC BLOOD PRESSURE: 127 MMHG

## 2023-09-08 LAB
BILIRUB UR QL STRIP: NORMAL
GLUCOSE UR-MCNC: NORMAL
HCG UR QL: 0.2 EU/DL
HGB UR QL STRIP.AUTO: NORMAL
KETONES UR-MCNC: NORMAL
LEUKOCYTE ESTERASE UR QL STRIP: NORMAL
NITRITE UR QL STRIP: NORMAL
PH UR STRIP: 5.5
PROT UR STRIP-MCNC: 30
SP GR UR STRIP: 1.03

## 2023-09-08 PROCEDURE — 52000 CYSTOURETHROSCOPY: CPT

## 2023-09-08 PROCEDURE — 99214 OFFICE O/P EST MOD 30 MIN: CPT | Mod: 25

## 2023-09-08 NOTE — ASSESSMENT
[FreeTextEntry1] : Recurrent UTIs: cysto showed diffuse inflammation. no signs of cancer will switch from macrobid to low dose daily bactrim  Pressure sensation: like OAB start solifenacin low dose

## 2023-09-08 NOTE — HISTORY OF PRESENT ILLNESS
[FreeTextEntry1] : 77yo F hx recurrent UTI presents for f/u She is here for cysto evaluation She has been on bactrim bid x 10 days now. stopped macrobid   Pt had Renal/bladder US 1 month ago showing b/l renal cysts. pvr 0cc pvr today:  Reviewed bloodwork from her PCP:   Pt moved from [place] this year She was prescribed macrobid daily prophylactid dose for recurrent UTI

## 2023-09-19 ENCOUNTER — APPOINTMENT (OUTPATIENT)
Dept: CV DIAGNOSTICS | Facility: HOSPITAL | Age: 78
End: 2023-09-19

## 2023-10-10 ENCOUNTER — APPOINTMENT (OUTPATIENT)
Dept: NEPHROLOGY | Facility: CLINIC | Age: 78
End: 2023-10-10

## 2023-10-12 ENCOUNTER — APPOINTMENT (OUTPATIENT)
Dept: UROLOGY | Facility: CLINIC | Age: 78
End: 2023-10-12

## 2023-11-06 ENCOUNTER — RX RENEWAL (OUTPATIENT)
Age: 78
End: 2023-11-06

## 2023-11-20 ENCOUNTER — OUTPATIENT (OUTPATIENT)
Dept: OUTPATIENT SERVICES | Facility: HOSPITAL | Age: 78
LOS: 1 days | End: 2023-11-20
Payer: MEDICARE

## 2023-11-20 ENCOUNTER — APPOINTMENT (OUTPATIENT)
Dept: CV DIAGNOSTICS | Facility: HOSPITAL | Age: 78
End: 2023-11-20

## 2023-11-20 DIAGNOSIS — S72.009A FRACTURE OF UNSPECIFIED PART OF NECK OF UNSPECIFIED FEMUR, INITIAL ENCOUNTER FOR CLOSED FRACTURE: Chronic | ICD-10-CM

## 2023-11-20 DIAGNOSIS — Z98.890 OTHER SPECIFIED POSTPROCEDURAL STATES: Chronic | ICD-10-CM

## 2023-11-20 DIAGNOSIS — R07.9 CHEST PAIN, UNSPECIFIED: ICD-10-CM

## 2023-11-20 DIAGNOSIS — Z95.1 PRESENCE OF AORTOCORONARY BYPASS GRAFT: Chronic | ICD-10-CM

## 2023-11-20 PROCEDURE — 78451 HT MUSCLE IMAGE SPECT SING: CPT | Mod: 26,MH

## 2023-11-20 PROCEDURE — 93018 CV STRESS TEST I&R ONLY: CPT | Mod: GC,MH

## 2023-11-20 PROCEDURE — 93016 CV STRESS TEST SUPVJ ONLY: CPT | Mod: GC,MH

## 2024-03-08 ENCOUNTER — APPOINTMENT (OUTPATIENT)
Dept: UROLOGY | Facility: CLINIC | Age: 79
End: 2024-03-08
Payer: MEDICARE

## 2024-03-08 VITALS
HEART RATE: 79 BPM | WEIGHT: 152 LBS | OXYGEN SATURATION: 95 % | HEIGHT: 62 IN | DIASTOLIC BLOOD PRESSURE: 101 MMHG | SYSTOLIC BLOOD PRESSURE: 172 MMHG | BODY MASS INDEX: 27.97 KG/M2

## 2024-03-08 DIAGNOSIS — R10.32 LEFT LOWER QUADRANT PAIN: ICD-10-CM

## 2024-03-08 DIAGNOSIS — Z87.891 PERSONAL HISTORY OF NICOTINE DEPENDENCE: ICD-10-CM

## 2024-03-08 DIAGNOSIS — N32.81 OVERACTIVE BLADDER: ICD-10-CM

## 2024-03-08 PROCEDURE — 99214 OFFICE O/P EST MOD 30 MIN: CPT

## 2024-03-08 NOTE — HISTORY OF PRESENT ILLNESS
[FreeTextEntry1] : 79yo F hx recurrent UTI presents for f/u She was told by her pcp she has blood in urine.  She completed 3 months of low dose bactrim for preventative. Recently neg Ucx  She was also taking solifenacin 5mg for OAB but ran out.  She states she is having urinary difficulty once again. Occasional straining. Also reports some lower abdominal pain.  urination was better on solifenacin  Cysto neg 9/2024 UA showed no rbc on 2/2024; neg UCx

## 2024-03-08 NOTE — ASSESSMENT
[FreeTextEntry1] : Lower abdominal pain: will obtain CT and f/u recent UCx neg.  previously has hx of recurrent UTIs  Overactive Bladder: restart solifenacin low dose f/u 3 weeks

## 2024-03-12 RX ORDER — SOLIFENACIN SUCCINATE 5 MG/1
5 TABLET ORAL
Qty: 90 | Refills: 0 | Status: ACTIVE | COMMUNITY
Start: 2023-09-08 | End: 1900-01-01

## 2024-04-02 NOTE — ASU PREOP CHECKLIST - ALLERGY BAND ON
Neurosurgery Office Note  Maria Elena Sandoval 79 y.o. female MRN: [id number]      Assessment/Plan     Chronic pain syndrome  Presents for consultation regarding her low back and L leg pain.   Referred by pain management for further evaluation regarding possible spinal cord stimulator versus surgical options.    Imaging:   MRI lumbar spine 7/10/2023: Postoperative changes and multilevel degenerative disc disease.  Multifactorial disease resulting in moderate mass effect on the thecal sac at L3-4.  Moderate to severe foraminal narrowing on the left at L4-5 and bilaterally at L5-S1.  MRI thoracic spine 3/7/2024: Scoliosis and exaggerated lumbar lordosis.  Multiple chronic compression deformities with the worst at T5.  No acute compression fracture.  Degenerative disc disease with disc herniations at T7-8 and T10-11 as well as T11-12 with mild canal stenosis.  No cord compression identified.    Plan:   Continue to monitor symptoms   Most consistent with L4-5 nerve root radiculopathy which is within her prior surgical levels  Reviewed imaging with patient in room  Given her scoliosis and prior surgery, surgical correction of radiculopathy would be a significant surgery requiring further hardware removal and placement with decompression  Would still anticipate complex surgical procedure with likelihood to still have pain post operatively  Patient not interesting in large surgical procedure as she did not have favorable outcome from her first surgery several years ago.  Follows with PM   Recommended trial for SCS. She has mile degenerative changes in the thoracic spine although these appear to be isolated to the ventral aspect  Can proceed with trial of SCS as patient interested in less invasive options for pain relief.   Discussion held with patient regarding realistic goals for her pain control.  It is unlikely for patient ever to be pain-free but diminished pain may result in improved quality of life which is primary goal.    Continue taking pain medication as prescribed  Continue to be active with walking and low impact activities as tolerated  Should patient have improved pain control would recommend physical therapy for gait and stability  Follow up after trial of SCS should patient be willing to proceed with placement of permanent system. Encouraged to call with questions or concerns     Diagnoses and all orders for this visit:    Compression fracture of T5 vertebra, initial encounter (HCC)  -     Ambulatory referral to Neurosurgery    Thoracic disc herniation  -     Ambulatory referral to Neurosurgery    Chronic pain syndrome  -     Ambulatory referral to Neurosurgery    Chronic bilateral low back pain with bilateral sciatica  -     Ambulatory referral to Neurosurgery    Sacroiliitis ([facility])  -     Ambulatory referral to Neurosurgery    Other orders  -     venlafaxine (EFFEXOR-XR) 75 mg 24 hr capsule; Take 75 mg by mouth daily  -     ibuprofen (Advil) 200 mg tablet; Take by mouth every 6 (six) hours as needed for mild pain        I have spent a total time of 40 minutes on 04/02/24 in caring for this patient including Diagnostic results, Prognosis, Risks and benefits of tx options, Instructions for management, Patient and family education, Importance of tx compliance, Risk factor reductions, Impressions, Counseling / Coordination of care, Documenting in the medical record, Reviewing / ordering tests, medicine, procedures  , and Obtaining or reviewing history  .      CHIEF COMPLAINT    Chief Complaint   Patient presents with    Consult     Back pain        HISTORY    History of Present Illness     79 y.o. year old female who presents to the outpatient neurosurgical office as a new patient consultation regarding her low back and left leg pain.  Patient's symptoms have been longstanding for several years although reports worsening over the last several months.  Patient does have significant past surgical history including a left L4-5  partial facetectomy foraminotomy and discectomy with removal and replacement of hardware.  Patient reports the original surgery was in June or July of the same year with the above-stated revision and August 2020.  Patient's daughter is present with her at the time of this appointment and reports that patient never recovered fully following her initial surgery.  She has pain similar to how her preoperative symptoms were.  Is primarily centered in her left butt cheek radiating down the posterior lateral aspect of the leg to the foot.  She does have some associated neuropathy in the feet and hands.  She uses topical cream to assist with these pains.  Unfortunately her gait and ambulation began to worsen following her most recent surgery with frequent falls.  On imaging she has some now appearing chronic compression fractures which may have been related to her prior injuries.  She does also have a significant degenerative scoliotic deformity best seen on MRI of the lumbar spine.  Patient was undergoing evaluation with Dr. Mora with pain management.  She has trialed injections without any substantial relief.  It was recommended to her to undergo evaluation for spinal cord stimulator and she was referred to neurosurgery to discuss candidacy.  During this visit I also discussed with the patient surgical correction regarding her lumbar radiculopathy.  Given her prior surgical intervention, further assessment and evaluation of her lumbar spine would require x-ray, CT further discussion.  She would likely a more extensive revision than her original surgery given the prior fusion.  Given the length of her symptoms and imaging I advised the patient is unlikely she will ever be pain-free.  Patient was very hesitant to discuss surgical options further.  We did also discuss spinal cord stimulator with expectations of reducing pain to have improved quality of life.  Patient and daughter were agreeable to proceed with cord  stimulator trial.  Should patient not find trial beneficial can further discussion revisional surgical intervention with attending spine surgeon.  Patient denies any right leg radiculopathy.  Denies any bowel or bladder dysfunction.  Uses a cane/walker for ambulatory support.  She did present with a cane and other use assistance from her daughter to ambulate in the hallway.        See Discussion    REVIEW OF SYSTEMS    Review of Systems   Musculoskeletal:  Positive for back pain and gait problem.        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 Back pain radiates L leg w N/W and difficulty walking, tingling hands and feet.patient feel unbalanced ( uses cane) since 2002 which has worsen in the last 7 mon. -Patient rate pain 10/10  Meds Tylenol PRN   PT last ov 1/10/24  PM last ov 2/20/24  TAI 8/25/23 LESI L5-S1   MRI Tspine 3/7/24 (Pt reports 30-40% improvement 2wk post inj -Pain level 2-3/10) & 6/9/23 Sacroiliac join inj (Patient Reports       45  %  improvement post injection  Pain Level  4-5  /10)     Neurological:  Positive for weakness and numbness.       ROS obtained by MA. Reviewed. See HPI.     Meds/Allergies     Current Outpatient Medications   Medication Sig Dispense Refill    calcium carbonate (OS-HUE) 600 MG tablet Take 600 mg by mouth daily      candesartan (ATACAND) 32 MG tablet Take 32 mg by mouth daily      carvedilol (COREG) 12.5 mg tablet Take 12.5 mg by mouth 2 (two) times a day with meals      ibuprofen (Advil) 200 mg tablet Take by mouth every 6 (six) hours as needed for mild pain      irbesartan (AVAPRO) 150 mg tablet       Multiple Vitamin (multivitamin) tablet Take 1 tablet by mouth daily      prednisoLONE 5 MG (21) TBPK       venlafaxine (EFFEXOR-XR) 75 mg 24 hr capsule Take 75 mg by mouth daily    "   zolpidem (AMBIEN) 5 mg tablet       bacitracin topical ointment 500 units/g topical ointment Apply 1 large application topically 2 (two) times a day 28 g 0    DULoxetine (CYMBALTA) 60 mg delayed release capsule Take 1 capsule (60 mg total) by mouth daily 30 capsule 2    meloxicam (MOBIC) 7.5 mg tablet  (Patient not taking: Reported on 11/7/2023)      Myrbetriq 50 MG TB24 25 mg      pantoprazole (PROTONIX) 40 mg tablet  (Patient not taking: Reported on 2/20/2024)      pregabalin (LYRICA) 25 mg capsule Take 1 capsule (25 mg total) by mouth 2 (two) times a day 60 capsule 1    traMADol (ULTRAM) 50 mg tablet        No current facility-administered medications for this visit.       No Known Allergies    PAST HISTORY    Past Medical History:   Diagnosis Date    Arthritis     Fibromyalgia, primary     Sciatica        Past Surgical History:   Procedure Laterality Date    COLON SURGERY      colostomy    EPIDURAL BLOCK INJECTION N/A 08/25/2023    Procedure: L5-S1 LUMBAR epidural steroid injection (74170);  Surgeon: Hector Mora DO;  Location: [facility] MAIN OR;  Service: Pain Management     NC INJECT SI JOINT ARTHRGRPHY&/ANES/STEROID W/PRIMO Bilateral 06/09/2023    Procedure: SACROILIAC joint injection (83463 );  Surgeon: Endy Christopher MD;  Location: [facility] MAIN OR;  Service: Pain Management     SPINAL FUSION      SPINE SURGERY         Social History     Tobacco Use    Smoking status: Former     Types: Cigarettes    Smokeless tobacco: Never   Substance Use Topics    Alcohol use: Never    Drug use: Never       Family History   Problem Relation Age of Onset    No Known Problems Mother     No Known Problems Father          Above history personally reviewed.       EXAM    Vitals:Blood pressure 148/78, pulse 76, temperature 97.9 °F (36.6 °C), height 5' 1\" (1.549 m), weight 83.5 kg (184 lb), SpO2 90%.,Body mass index is 34.77 kg/m².     Physical Exam  Constitutional:       Appearance: Normal appearance.   HENT:      Head: " Atraumatic.   Eyes:      Extraocular Movements: Extraocular movements intact and EOM normal.   Musculoskeletal:         General: Tenderness present. Normal range of motion.      Cervical back: Normal range of motion.   Neurological:      Mental Status: She is alert and oriented to person, place, and time.      Sensory: No sensory deficit.      Motor: Motor strength is normal.No weakness.   Psychiatric:         Mood and Affect: Mood normal.         Speech: Speech normal.         Behavior: Behavior normal.         Neurologic Exam     Mental Status   Oriented to person, place, and time.   Attention: normal.   Speech: speech is normal   Level of consciousness: alert  Knowledge: good.   Normal comprehension.     Cranial Nerves     CN III, IV, VI   Extraocular motions are normal.   Upgaze: normal  Downgaze: normal    CN V   Facial sensation intact.     CN VII   Facial expression full, symmetric.     CN VIII   CN VIII normal.   Hearing: intact    Motor Exam   Muscle bulk: normal  Right arm tone: normal  Left arm tone: normal  Right leg tone: normal  Left leg tone: normal    Strength   Strength 5/5 throughout.     Sensory Exam   Light touch normal.     Gait, Coordination, and Reflexes     Gait  Gait: (wide based with use of a cane for ambulatory support)    Tremor   Resting tremor: absent  Action tremor: absent    MEDICAL DECISION MAKING    Imaging Studies:     MRI thoracic spine without contrast    Result Date: 3/13/2024  Narrative: MRI THORACIC SPINE WITHOUT CONTRAST INDICATION: M54.42: Lumbago with sciatica, left side M54.41: Lumbago with sciatica, right side G89.29: Other chronic pain M96.1: Postlaminectomy syndrome, not elsewhere classified M54.9: Dorsalgia, unspecified Z98.890: Other specified postprocedural states G89.4: Chronic pain syndrome M48.061: Spinal stenosis, lumbar region without neurogenic claudication. COMPARISON: MRI lumbar spine dated 7/10/2023 TECHNIQUE:  Multiplanar, multisequence imaging of the  thoracic spine was performed. . IMAGE QUALITY: Diagnostic. FINDINGS: ALIGNMENT: Exaggerated smooth thoracic kyphosis within the lower thoracic spine. Focal mild kyphotic angulation in the upper thoracic spine centered at the T5 level as a result of a chronic moderate T5 compression fracture. Moderate levoscoliosis at the thoracolumbar junction. There is a mild chronic compression deformity of the T4 superior endplate and a mild chronic compression deformity of the T6 superior endplate. Slight anterior wedging of the T12 vertebral body appears chronic. MARROW SIGNAL: Fatty marrow noted within the T5 vertebral body, T6 superior endplate, T12-L1 endplates and L2-3 endplates. No focal marrow edema identified. THORACIC CORD: Normal signal within the thoracic cord. PARAVERTEBRAL SOFT TISSUES:  Normal. THORACIC DEGENERATIVE CHANGE: Mild lower cervical and upper thoracic degenerative change with annular bulging. Mild foraminal narrowing at the T2-3 level. Mild foraminal narrowing at T5-6 as a result of mild posterior element hypertrophic change. At T7-8 there is a paramedian disc herniation with slight superior and inferior extrusion on the right with mild diffuse annular bulging. There is facet hypertrophic degenerative change present as well. Mild canal stenosis without cord compression. Moderate left and mild right foraminal narrowing. At T10-11 there is a small inferiorly extruded central disc herniation extending inferiorly to the level of the T11-12 disc space where there is a superiorly extruded disc herniation. Calcification posterior to the T11 vertebral body within the anterior epidural space. Mild canal stenosis without cord compression or foraminal narrowing. T12-L1 demonstrates a small central disc herniation. No canal stenosis or foraminal nerve impingement. At L2-3 there is diffuse annular bulging with endplate hypertrophic change primarily right-sided. Mild to moderate canal stenosis without foraminal  narrowing. OTHER FINDINGS:  None.     Impression: Scoliosis and exaggerated lumbar lordosis. Multiple chronic compression deformities are noted worst in T5 where there is an approximately 50% loss of height of the vertebral body. No acute compression fractures are identified. Lumbar degenerative disc disease with disc herniations most prominent at the T7-8 level and at the T10-11/T11-12 level where there is intervening ossification in the anterior epidural space. There is also a disc herniation at T12-L1. Mild canal stenosis with no resulting cord compression. Varying degrees of foraminal narrowing most prominent on the left at T7-8 and bilaterally at T2-3. Workstation performed: [id number]       I have personally reviewed pertinent reports.     done

## 2024-04-12 ENCOUNTER — APPOINTMENT (OUTPATIENT)
Dept: UROLOGY | Facility: CLINIC | Age: 79
End: 2024-04-12

## 2024-05-01 ENCOUNTER — APPOINTMENT (OUTPATIENT)
Dept: NEPHROLOGY | Facility: CLINIC | Age: 79
End: 2024-05-01

## 2024-05-13 ENCOUNTER — APPOINTMENT (OUTPATIENT)
Dept: UROLOGY | Facility: CLINIC | Age: 79
End: 2024-05-13
Payer: MEDICARE

## 2024-05-13 VITALS
DIASTOLIC BLOOD PRESSURE: 68 MMHG | BODY MASS INDEX: 27.44 KG/M2 | HEART RATE: 85 BPM | SYSTOLIC BLOOD PRESSURE: 120 MMHG | WEIGHT: 150 LBS

## 2024-05-13 DIAGNOSIS — N39.0 URINARY TRACT INFECTION, SITE NOT SPECIFIED: ICD-10-CM

## 2024-05-13 PROCEDURE — 99213 OFFICE O/P EST LOW 20 MIN: CPT

## 2024-05-13 RX ORDER — SULFAMETHOXAZOLE AND TRIMETHOPRIM 400; 80 MG/1; MG/1
400-80 TABLET ORAL DAILY
Qty: 90 | Refills: 1 | Status: ACTIVE | COMMUNITY
Start: 2023-09-08 | End: 1900-01-01

## 2024-05-13 RX ORDER — CEFDINIR 300 MG/1
300 CAPSULE ORAL
Qty: 10 | Refills: 0 | Status: ACTIVE | COMMUNITY
Start: 2024-05-13 | End: 1900-01-01

## 2024-05-13 NOTE — ASSESSMENT
[FreeTextEntry1] : Lower abdominal pain: recheck culture start cefdinir will start low dose bactrim after cefdinir course pt will return if symptoms persists   Overactive bladder symptoms: stop solifenacin due to side effect cramps

## 2024-05-13 NOTE — HISTORY OF PRESENT ILLNESS
[FreeTextEntry1] : 79yo F hx recurrent UTI presents for f/u She was told by her pcp she has blood in urine.  She ran out of low dose bactrim for UTI preventative. Currently feels like she has an UTI She states she is having urinary difficulty once again. Occasional straining. Also reports some lower abdominal pain.   She tried solifenacin 5mg but reports it caused her cramps so she stopped.  Cysto neg 9/2023 UA showed no rbc on 2/2024; neg UCx Renal/Bladder US 9/2023: 0cc pvr

## 2024-05-15 LAB — BACTERIA UR CULT: NORMAL

## 2024-05-16 RX ORDER — ESTRADIOL 0.1 MG/G
0.1 CREAM VAGINAL
Qty: 1 | Refills: 1 | Status: ACTIVE | COMMUNITY
Start: 2023-04-03 | End: 1900-01-01

## 2024-05-20 ENCOUNTER — RESULT REVIEW (OUTPATIENT)
Age: 79
End: 2024-05-20

## 2024-05-20 ENCOUNTER — OUTPATIENT (OUTPATIENT)
Dept: OUTPATIENT SERVICES | Facility: HOSPITAL | Age: 79
LOS: 1 days | End: 2024-05-20
Payer: MEDICARE

## 2024-05-20 ENCOUNTER — APPOINTMENT (OUTPATIENT)
Dept: CV DIAGNOSTICS | Facility: HOSPITAL | Age: 79
End: 2024-05-20

## 2024-05-20 DIAGNOSIS — R07.89 OTHER CHEST PAIN: ICD-10-CM

## 2024-05-20 DIAGNOSIS — Z95.1 PRESENCE OF AORTOCORONARY BYPASS GRAFT: Chronic | ICD-10-CM

## 2024-05-20 DIAGNOSIS — S72.009A FRACTURE OF UNSPECIFIED PART OF NECK OF UNSPECIFIED FEMUR, INITIAL ENCOUNTER FOR CLOSED FRACTURE: Chronic | ICD-10-CM

## 2024-05-20 DIAGNOSIS — Z98.890 OTHER SPECIFIED POSTPROCEDURAL STATES: Chronic | ICD-10-CM

## 2024-05-20 PROCEDURE — 93016 CV STRESS TEST SUPVJ ONLY: CPT | Mod: GC,MC

## 2024-05-20 PROCEDURE — 78451 HT MUSCLE IMAGE SPECT SING: CPT | Mod: 26,MC

## 2024-05-20 PROCEDURE — 93018 CV STRESS TEST I&R ONLY: CPT | Mod: GC,MC

## 2024-09-25 ENCOUNTER — APPOINTMENT (OUTPATIENT)
Dept: UROLOGY | Facility: CLINIC | Age: 79
End: 2024-09-25
Payer: MEDICARE

## 2024-09-25 VITALS
HEIGHT: 62 IN | BODY MASS INDEX: 27.6 KG/M2 | WEIGHT: 150 LBS | TEMPERATURE: 95.3 F | SYSTOLIC BLOOD PRESSURE: 126 MMHG | DIASTOLIC BLOOD PRESSURE: 82 MMHG | HEART RATE: 72 BPM

## 2024-09-25 DIAGNOSIS — N39.0 URINARY TRACT INFECTION, SITE NOT SPECIFIED: ICD-10-CM

## 2024-09-25 LAB
APPEARANCE: CLEAR
BILIRUBIN URINE: NEGATIVE
BLOOD URINE: ABNORMAL
COLOR: YELLOW
GLUCOSE QUALITATIVE U: 100
KETONES URINE: ABNORMAL
LEUKOCYTE ESTERASE URINE: ABNORMAL
NITRITE URINE: POSITIVE
PH URINE: 5.5
PROTEIN URINE: 30
SPECIFIC GRAVITY URINE: 1.02
UROBILINOGEN URINE: 1 (ref 0.2–?)

## 2024-09-25 PROCEDURE — 99213 OFFICE O/P EST LOW 20 MIN: CPT

## 2024-09-25 RX ORDER — PHENAZOPYRIDINE HYDROCHLORIDE 200 MG/1
200 TABLET ORAL 3 TIMES DAILY
Qty: 15 | Refills: 0 | Status: ACTIVE | COMMUNITY
Start: 2024-09-25 | End: 1900-01-01

## 2024-09-25 RX ORDER — CIPROFLOXACIN HYDROCHLORIDE 500 MG/1
500 TABLET, FILM COATED ORAL TWICE DAILY
Qty: 14 | Refills: 0 | Status: ACTIVE | COMMUNITY
Start: 2024-09-25 | End: 1900-01-01

## 2024-09-25 NOTE — ASSESSMENT
[FreeTextEntry1] : Lower abdominal pain: recheck culture start cipro 7 day course and pyridium prn will f/u 2-3 weeks to discuss prophylaxis options.  start daily probiotic stop low dose bactrim for now  Overactive bladder symptoms: stop solifenacin due to side effect cramps

## 2024-09-25 NOTE — HISTORY OF PRESENT ILLNESS
[FreeTextEntry1] : 77yo F hx recurrent UTI presents for f/u She was at urgent care 2 weeks ago for UTI symptoms. She finished course of abx but reports symptoms are persistent She takes low dose bactrim for UTI preventative. Currently feels like she has an UTI. She reports pain with urination  She tried solifenacin 5mg in past but reports it caused her cramps so she stopped.  Cysto neg 9/2023 UA showed no rbc on 2/2024; neg UCx Renal/Bladder US 9/2023: 0cc pvr

## 2024-09-30 LAB — BACTERIA UR CULT: ABNORMAL

## 2024-09-30 RX ORDER — CIPROFLOXACIN HYDROCHLORIDE 500 MG/1
500 TABLET, FILM COATED ORAL TWICE DAILY
Qty: 6 | Refills: 0 | Status: ACTIVE | COMMUNITY
Start: 2024-09-30 | End: 1900-01-01

## 2024-10-17 ENCOUNTER — APPOINTMENT (OUTPATIENT)
Dept: UROLOGY | Facility: CLINIC | Age: 79
End: 2024-10-17
Payer: MEDICARE

## 2024-10-17 VITALS
HEIGHT: 62 IN | BODY MASS INDEX: 27.97 KG/M2 | SYSTOLIC BLOOD PRESSURE: 127 MMHG | WEIGHT: 152 LBS | HEART RATE: 56 BPM | DIASTOLIC BLOOD PRESSURE: 74 MMHG

## 2024-10-17 DIAGNOSIS — N32.81 OVERACTIVE BLADDER: ICD-10-CM

## 2024-10-17 DIAGNOSIS — N39.0 URINARY TRACT INFECTION, SITE NOT SPECIFIED: ICD-10-CM

## 2024-10-17 PROCEDURE — 99214 OFFICE O/P EST MOD 30 MIN: CPT

## 2024-10-17 RX ORDER — CEFUROXIME AXETIL 250 MG/1
250 TABLET ORAL
Qty: 90 | Refills: 0 | Status: ACTIVE | COMMUNITY
Start: 2024-10-17 | End: 1900-01-01

## 2024-10-17 RX ORDER — CEFUROXIME AXETIL 500 MG/1
500 TABLET ORAL
Qty: 14 | Refills: 0 | Status: ACTIVE | COMMUNITY
Start: 2024-10-17 | End: 1900-01-01

## 2024-10-17 RX ORDER — TROSPIUM CHLORIDE 60 MG/1
60 CAPSULE, EXTENDED RELEASE ORAL
Qty: 60 | Refills: 0 | Status: ACTIVE | COMMUNITY
Start: 2024-10-17 | End: 1900-01-01

## 2024-10-21 LAB — BACTERIA UR CULT: ABNORMAL

## 2024-12-05 ENCOUNTER — APPOINTMENT (OUTPATIENT)
Dept: UROLOGY | Facility: CLINIC | Age: 79
End: 2024-12-05

## 2025-01-18 NOTE — PATIENT PROFILE ADULT - NSPROPTRIGHTNOTIFY_GEN_A_NUR
Attempted to call [facility]. [facility] charge is busy at this time. Will attempt to call back later.    declines

## 2025-03-22 NOTE — PACU DISCHARGE NOTE - NSPTMEETSDISCHCRITERIADT_GEN_A_CORE
Chief Complaint   Patient presents with    Fall    Hip Pain     Left      Pt bib ems from home, pt had glf unsure why she fell denies dizziness or syncope episode. Pt said that she lost a lot of weight and possibly contributed to her falling.   Pt landed on her left hip , unable to straightened hip , cms+  Pt on Eliquis and aspirin, denies hitting head   07-Sep-2022 19:04

## (undated) DEVICE — MEDICATION LABELS W MARKER

## (undated) DEVICE — SUT BIOSYN 4-0 18" P-12

## (undated) DEVICE — GLV 6.5 PROTEXIS (WHITE)

## (undated) DEVICE — SOL IRR POUR NS 0.9% 500ML

## (undated) DEVICE — DRAPE TOWEL BLUE 17" X 24"

## (undated) DEVICE — ELCTR HEX BLADE

## (undated) DEVICE — BULLDOG SPRING CLIP 6MM SOFT/SOFT

## (undated) DEVICE — SUT SOFSILK 2-0 18" TIES

## (undated) DEVICE — SUT POLYSORB 2-0 30" GS-21 UNDYED

## (undated) DEVICE — NDL HYPO REGULAR BEVEL 25G X 1.5" (BLUE)

## (undated) DEVICE — DRAIN JACKSON PRATT 10MM FLAT FULL NO TROCAR

## (undated) DEVICE — GOWN XL

## (undated) DEVICE — NDL SAFETY BUTTERFLY 21G X 3/4

## (undated) DEVICE — DRAIN RESERVOIR FOR JACKSON PRATT 100CC CARDINAL

## (undated) DEVICE — SUCTION YANKAUER NO CONTROL VENT

## (undated) DEVICE — FOLEY TRAY 16FR 5CC LTX UMETER CLOSED

## (undated) DEVICE — SUT PROLENE 7-0 4-24" BV-1

## (undated) DEVICE — DRSG STERISTRIPS 0.5 X 4"

## (undated) DEVICE — LAP PAD 18 X 18"

## (undated) DEVICE — DRAPE INSTRUMENT POUCH 6.75" X 11"

## (undated) DEVICE — GOWN TRIMAX LG

## (undated) DEVICE — SOL IRR POUR H2O 250ML

## (undated) DEVICE — SYR TB 1CC 25G X 5/8 (BLUE)

## (undated) DEVICE — STOPCOCK 4-WAY W SWIVEL MALE LUER LOCK NON VENTED RED CAP

## (undated) DEVICE — VENODYNE/SCD SLEEVE CALF LARGE

## (undated) DEVICE — CLAMP BULLDOG MAXI 45 DEGREE (ORANGE) DISP

## (undated) DEVICE — DRSG MASTISOL

## (undated) DEVICE — SOL INJ NS 0.9% 500ML 1-PORT

## (undated) DEVICE — SUT SOFSILK 4-0 18" TIES

## (undated) DEVICE — DRAPE MAGNETIC INSTRUMENT MEDIUM

## (undated) DEVICE — TUNNELER SHEATH GREEN LARGE

## (undated) DEVICE — SUT SOFSILK 3-0 30" TIES

## (undated) DEVICE — PREP CHLORAPREP HI-LITE ORANGE 3ML

## (undated) DEVICE — DRAPE MAYO STAND 30"

## (undated) DEVICE — DRAPE 3/4 SHEET W REINFORCEMENT 56X77"

## (undated) DEVICE — CLAMP BULLDOG MINI STRAIGHT (GREEN) DISP

## (undated) DEVICE — SUT SOFSILK 0 18" TIES

## (undated) DEVICE — DRAPE IOBAN 23" X 23"

## (undated) DEVICE — DRSG OPSITE 13.75 X 4"

## (undated) DEVICE — STEALTH CLAMP INSERT SOFT/SOFT 30MM

## (undated) DEVICE — TOURNIQUET SET SURE-SNARE 22FR (2 TUBES, 2 UMBILICAL TAPES, 2 PLASTIC SNARES) 5"

## (undated) DEVICE — DRSG TEGADERM 6"X8"

## (undated) DEVICE — GLV 7 PROTEXIS (WHITE)

## (undated) DEVICE — PACK CAROTID ENDARTERECTOMY

## (undated) DEVICE — ELCTR BVI ACCU-TEMP CAUTERY SHAFT FINE TIP 1/2"

## (undated) DEVICE — VISITEC 4X4

## (undated) DEVICE — BLADE SCALPEL SAFETYLOCK #15

## (undated) DEVICE — GLV 8.5 PROTEXIS (WHITE)

## (undated) DEVICE — INFLATION DEVICE BASIXCOMPAK

## (undated) DEVICE — SUT SOFSILK 3-0 18" TIES

## (undated) DEVICE — GLV 7.5 PROTEXIS (WHITE)

## (undated) DEVICE — BLADE SCALPEL SAFETYLOCK #11

## (undated) DEVICE — PACK FEM/POP

## (undated) DEVICE — WARMING BLANKET UPPER ADULT

## (undated) DEVICE — POSITIONER FOAM EGG CRATE ULNAR 2PCS (PINK)

## (undated) DEVICE — GLV 8 PROTEXIS (WHITE)

## (undated) DEVICE — DRSG TEGADERM 8 X 12"

## (undated) DEVICE — DRSG AQUACEL 3.5 X 6"

## (undated) DEVICE — SUT PROLENE 6-0 4-30" BV-1

## (undated) DEVICE — CLAMP BULLDOG MIDI 45 DEGREE (GREEN) DISP

## (undated) DEVICE — DRAPE IOBAN 13" X 13"

## (undated) DEVICE — SPECIMEN CONTAINER 100ML

## (undated) DEVICE — DRSG OPSITE 2.5 X 2"

## (undated) DEVICE — SUT PROLENE 5-0 30" RB-2

## (undated) DEVICE — SUT SOFSILK 2-0 30" TIES

## (undated) DEVICE — STAPLER SKIN VISI-STAT 35 WIDE

## (undated) DEVICE — SUT BOOT STANDARD (YELLOW) 5 PAIR

## (undated) DEVICE — SYR ASEPTO

## (undated) DEVICE — SUT PROLENE 6-0 4-30" C-1

## (undated) DEVICE — WARMING BLANKET LOWER ADULT

## (undated) DEVICE — DRSG COMBINE 5X9"

## (undated) DEVICE — SUT SOFSILK 4-0 30" TIES

## (undated) DEVICE — DRSG KLING 6"

## (undated) DEVICE — MARKING PEN W RULER

## (undated) DEVICE — PREP CHLORAPREP HI-LITE ORANGE 26ML

## (undated) DEVICE — DRAPE 1/2 SHEET 40X57"

## (undated) DEVICE — CLAMP BULLDOG MIDI 45 DEGREE (YELLOW) DISP